# Patient Record
Sex: FEMALE | URBAN - NONMETROPOLITAN AREA
[De-identification: names, ages, dates, MRNs, and addresses within clinical notes are randomized per-mention and may not be internally consistent; named-entity substitution may affect disease eponyms.]

---

## 2017-08-17 ENCOUNTER — APPOINTMENT (RX ONLY)
Dept: URBAN - NONMETROPOLITAN AREA CLINIC 1 | Facility: CLINIC | Age: 72
Setting detail: DERMATOLOGY
End: 2017-08-17

## 2017-08-17 DIAGNOSIS — L57.0 ACTINIC KERATOSIS: ICD-10-CM

## 2017-08-17 DIAGNOSIS — L82.1 OTHER SEBORRHEIC KERATOSIS: ICD-10-CM

## 2017-08-17 DIAGNOSIS — L81.4 OTHER MELANIN HYPERPIGMENTATION: ICD-10-CM

## 2017-08-17 PROCEDURE — 99213 OFFICE O/P EST LOW 20 MIN: CPT | Mod: 25

## 2017-08-17 PROCEDURE — ? LIQUID NITROGEN

## 2017-08-17 PROCEDURE — ? COUNSELING

## 2017-08-17 PROCEDURE — ? OBSERVATION

## 2017-08-17 PROCEDURE — 17003 DESTRUCT PREMALG LES 2-14: CPT

## 2017-08-17 PROCEDURE — 17000 DESTRUCT PREMALG LESION: CPT

## 2017-08-17 ASSESSMENT — LOCATION DETAILED DESCRIPTION DERM
LOCATION DETAILED: LEFT ANTERIOR PROXIMAL UPPER ARM
LOCATION DETAILED: LEFT MID-UPPER BACK
LOCATION DETAILED: LEFT PROXIMAL PRETIBIAL REGION
LOCATION DETAILED: LEFT MEDIAL BREAST 9-10:00 REGION
LOCATION DETAILED: LEFT VENTRAL PROXIMAL FOREARM

## 2017-08-17 ASSESSMENT — LOCATION SIMPLE DESCRIPTION DERM
LOCATION SIMPLE: LEFT PRETIBIAL REGION
LOCATION SIMPLE: LEFT BREAST
LOCATION SIMPLE: LEFT UPPER ARM
LOCATION SIMPLE: LEFT FOREARM
LOCATION SIMPLE: LEFT UPPER BACK

## 2017-08-17 ASSESSMENT — LOCATION ZONE DERM
LOCATION ZONE: LEG
LOCATION ZONE: TRUNK
LOCATION ZONE: ARM

## 2017-08-17 NOTE — PROCEDURE: OBSERVATION
Body Location Override (Optional - Billing Will Still Be Based On Selected Body Map Location If Applicable): L forearm
X Size Of Lesion In Cm (Optional): 1
Detail Level: Detailed
Morphology Per Location (Optional): Dark spot at 10 o'clock

## 2018-03-07 ENCOUNTER — APPOINTMENT (RX ONLY)
Dept: URBAN - NONMETROPOLITAN AREA CLINIC 1 | Facility: CLINIC | Age: 73
Setting detail: DERMATOLOGY
End: 2018-03-07

## 2018-03-07 DIAGNOSIS — L57.0 ACTINIC KERATOSIS: ICD-10-CM

## 2018-03-07 DIAGNOSIS — L82.1 OTHER SEBORRHEIC KERATOSIS: ICD-10-CM

## 2018-03-07 PROCEDURE — ? COUNSELING

## 2018-03-07 PROCEDURE — ? LIQUID NITROGEN

## 2018-03-07 PROCEDURE — 17000 DESTRUCT PREMALG LESION: CPT

## 2018-03-07 PROCEDURE — 17003 DESTRUCT PREMALG LES 2-14: CPT

## 2018-03-07 PROCEDURE — 99214 OFFICE O/P EST MOD 30 MIN: CPT | Mod: 25

## 2018-03-07 ASSESSMENT — LOCATION DETAILED DESCRIPTION DERM
LOCATION DETAILED: LEFT CENTRAL MALAR CHEEK
LOCATION DETAILED: RIGHT UPPER CUTANEOUS LIP
LOCATION DETAILED: RIGHT INFERIOR MEDIAL MALAR CHEEK
LOCATION DETAILED: MIDDLE STERNUM

## 2018-03-07 ASSESSMENT — LOCATION ZONE DERM
LOCATION ZONE: LIP
LOCATION ZONE: FACE
LOCATION ZONE: TRUNK

## 2018-03-07 ASSESSMENT — LOCATION SIMPLE DESCRIPTION DERM
LOCATION SIMPLE: RIGHT LIP
LOCATION SIMPLE: LEFT CHEEK
LOCATION SIMPLE: RIGHT CHEEK
LOCATION SIMPLE: CHEST

## 2018-09-06 ENCOUNTER — APPOINTMENT (RX ONLY)
Dept: URBAN - NONMETROPOLITAN AREA CLINIC 1 | Facility: CLINIC | Age: 73
Setting detail: DERMATOLOGY
End: 2018-09-06

## 2018-09-06 DIAGNOSIS — L57.8 OTHER SKIN CHANGES DUE TO CHRONIC EXPOSURE TO NONIONIZING RADIATION: ICD-10-CM

## 2018-09-06 DIAGNOSIS — L82.0 INFLAMED SEBORRHEIC KERATOSIS: ICD-10-CM

## 2018-09-06 PROCEDURE — 17110 DESTRUCTION B9 LES UP TO 14: CPT

## 2018-09-06 PROCEDURE — 99213 OFFICE O/P EST LOW 20 MIN: CPT | Mod: 25

## 2018-09-06 PROCEDURE — ? COUNSELING

## 2018-09-06 PROCEDURE — ? LIQUID NITROGEN

## 2018-09-06 ASSESSMENT — LOCATION DETAILED DESCRIPTION DERM
LOCATION DETAILED: RIGHT INFERIOR UPPER BACK
LOCATION DETAILED: RIGHT FOREHEAD
LOCATION DETAILED: LEFT CLAVICULAR SKIN
LOCATION DETAILED: LEFT MEDIAL UPPER BACK
LOCATION DETAILED: LEFT INFERIOR UPPER BACK
LOCATION DETAILED: RIGHT MID-UPPER BACK
LOCATION DETAILED: LEFT POSTERIOR SHOULDER
LOCATION DETAILED: LEFT MID-UPPER BACK
LOCATION DETAILED: STERNAL NOTCH
LOCATION DETAILED: SUBXIPHOID

## 2018-09-06 ASSESSMENT — LOCATION SIMPLE DESCRIPTION DERM
LOCATION SIMPLE: CHEST
LOCATION SIMPLE: LEFT CLAVICULAR SKIN
LOCATION SIMPLE: RIGHT FOREHEAD
LOCATION SIMPLE: RIGHT UPPER BACK
LOCATION SIMPLE: LEFT SHOULDER
LOCATION SIMPLE: LEFT UPPER BACK
LOCATION SIMPLE: ABDOMEN

## 2018-09-06 ASSESSMENT — LOCATION ZONE DERM
LOCATION ZONE: FACE
LOCATION ZONE: TRUNK
LOCATION ZONE: ARM

## 2018-09-06 NOTE — PROCEDURE: LIQUID NITROGEN
Add 52 Modifier (Optional): no
Include Z78.9 (Other Specified Conditions Influencing Health Status) As An Associated Diagnosis?: Yes
Medical Necessity Clause: This procedure was medically necessary because the lesions that were treated were:
Consent: The patient's consent was obtained including but not limited to risks of crusting, scabbing, blistering, scarring, darker or lighter pigmentary change, recurrence, incomplete removal and infection.
Detail Level: Simple
Number Of Freeze-Thaw Cycles: 2 freeze-thaw cycles
Medical Necessity Information: It is in your best interest to select a reason for this procedure from the list below. All of these items fulfill various CMS LCD requirements except the new and changing color options.
Post-Care Instructions: I reviewed with the patient in detail post-care instructions. Patient is to wear sunprotection, and avoid picking at any of the treated lesions. Pt may apply Vaseline to crusted or scabbing areas.

## 2018-12-11 ENCOUNTER — HOSPITAL ENCOUNTER (INPATIENT)
Facility: MEDICAL CENTER | Age: 73
LOS: 2 days | DRG: 446 | End: 2018-12-14
Attending: HOSPITALIST | Admitting: HOSPITALIST
Payer: MEDICARE

## 2018-12-11 ENCOUNTER — HOSPITAL ENCOUNTER (OUTPATIENT)
Facility: MEDICAL CENTER | Age: 73
End: 2018-12-11
Admitting: HOSPITALIST
Payer: MEDICARE

## 2018-12-11 PROBLEM — E03.9 HYPOTHYROID: Status: ACTIVE | Noted: 2018-12-11

## 2018-12-11 PROBLEM — K83.1 OBSTRUCTIVE JAUNDICE: Status: ACTIVE | Noted: 2018-12-11

## 2018-12-11 PROBLEM — I10 HYPERTENSION: Status: ACTIVE | Noted: 2018-12-11

## 2018-12-11 PROCEDURE — G0378 HOSPITAL OBSERVATION PER HR: HCPCS

## 2018-12-11 PROCEDURE — 99218 PR INITIAL OBSERVATION CARE,LEVL I: CPT | Performed by: HOSPITALIST

## 2018-12-11 PROCEDURE — 700105 HCHG RX REV CODE 258: Performed by: HOSPITALIST

## 2018-12-11 RX ORDER — LANOLIN ALCOHOL/MO/W.PET/CERES
2000 CREAM (GRAM) TOPICAL DAILY
COMMUNITY

## 2018-12-11 RX ORDER — AMOXICILLIN 250 MG
2 CAPSULE ORAL 2 TIMES DAILY
Status: DISCONTINUED | OUTPATIENT
Start: 2018-12-11 | End: 2018-12-14 | Stop reason: HOSPADM

## 2018-12-11 RX ORDER — SODIUM CHLORIDE 9 MG/ML
INJECTION, SOLUTION INTRAVENOUS CONTINUOUS
Status: DISCONTINUED | OUTPATIENT
Start: 2018-12-11 | End: 2018-12-13

## 2018-12-11 RX ORDER — ACETAMINOPHEN 160 MG
2000 TABLET,DISINTEGRATING ORAL DAILY
COMMUNITY

## 2018-12-11 RX ORDER — ACETAMINOPHEN 325 MG/1
650 TABLET ORAL EVERY 6 HOURS PRN
Status: DISCONTINUED | OUTPATIENT
Start: 2018-12-11 | End: 2018-12-14 | Stop reason: HOSPADM

## 2018-12-11 RX ORDER — OXYCODONE HYDROCHLORIDE 5 MG/1
2.5 TABLET ORAL
Status: DISCONTINUED | OUTPATIENT
Start: 2018-12-11 | End: 2018-12-14 | Stop reason: HOSPADM

## 2018-12-11 RX ORDER — OXYCODONE HYDROCHLORIDE 5 MG/1
5 TABLET ORAL
Status: DISCONTINUED | OUTPATIENT
Start: 2018-12-11 | End: 2018-12-14 | Stop reason: HOSPADM

## 2018-12-11 RX ORDER — LEVOTHYROXINE SODIUM 0.1 MG/1
100 TABLET ORAL DAILY
Status: DISCONTINUED | OUTPATIENT
Start: 2018-12-12 | End: 2018-12-14 | Stop reason: HOSPADM

## 2018-12-11 RX ORDER — ATORVASTATIN CALCIUM 40 MG/1
40 TABLET, FILM COATED ORAL EVERY EVENING
COMMUNITY
Start: 2018-10-23 | End: 2019-06-04

## 2018-12-11 RX ORDER — LISINOPRIL 5 MG/1
5 TABLET ORAL DAILY
Status: ON HOLD | COMMUNITY
Start: 2018-10-23 | End: 2019-02-09

## 2018-12-11 RX ORDER — POLYETHYLENE GLYCOL 3350 17 G/17G
1 POWDER, FOR SOLUTION ORAL
Status: DISCONTINUED | OUTPATIENT
Start: 2018-12-11 | End: 2018-12-14 | Stop reason: HOSPADM

## 2018-12-11 RX ORDER — LEVOTHYROXINE SODIUM 0.1 MG/1
100 TABLET ORAL DAILY
COMMUNITY
Start: 2018-10-23

## 2018-12-11 RX ORDER — ENALAPRILAT 1.25 MG/ML
1.25 INJECTION INTRAVENOUS EVERY 6 HOURS PRN
Status: DISCONTINUED | OUTPATIENT
Start: 2018-12-11 | End: 2018-12-14 | Stop reason: HOSPADM

## 2018-12-11 RX ORDER — BISACODYL 10 MG
10 SUPPOSITORY, RECTAL RECTAL
Status: DISCONTINUED | OUTPATIENT
Start: 2018-12-11 | End: 2018-12-14 | Stop reason: HOSPADM

## 2018-12-11 RX ORDER — LISINOPRIL 5 MG/1
5 TABLET ORAL DAILY
Status: DISCONTINUED | OUTPATIENT
Start: 2018-12-12 | End: 2018-12-14 | Stop reason: HOSPADM

## 2018-12-11 RX ORDER — MORPHINE SULFATE 4 MG/ML
2 INJECTION, SOLUTION INTRAMUSCULAR; INTRAVENOUS
Status: DISCONTINUED | OUTPATIENT
Start: 2018-12-11 | End: 2018-12-14 | Stop reason: HOSPADM

## 2018-12-11 RX ORDER — ATORVASTATIN CALCIUM 40 MG/1
40 TABLET, FILM COATED ORAL EVERY EVENING
Status: DISCONTINUED | OUTPATIENT
Start: 2018-12-11 | End: 2018-12-14 | Stop reason: HOSPADM

## 2018-12-11 RX ADMIN — SODIUM CHLORIDE: 9 INJECTION, SOLUTION INTRAVENOUS at 23:53

## 2018-12-11 ASSESSMENT — COGNITIVE AND FUNCTIONAL STATUS - GENERAL
DAILY ACTIVITIY SCORE: 24
SUGGESTED CMS G CODE MODIFIER MOBILITY: CH
SUGGESTED CMS G CODE MODIFIER DAILY ACTIVITY: CH
MOBILITY SCORE: 24

## 2018-12-11 ASSESSMENT — ENCOUNTER SYMPTOMS
COUGH: 0
DEPRESSION: 0
NAUSEA: 1
SORE THROAT: 0
BLURRED VISION: 0
SHORTNESS OF BREATH: 0
TINGLING: 0
INSOMNIA: 0
HEADACHES: 0
CHILLS: 0
PALPITATIONS: 0
FEVER: 0
DIZZINESS: 0
VOMITING: 0
ABDOMINAL PAIN: 1
NECK PAIN: 0
EYE PAIN: 0
BACK PAIN: 0

## 2018-12-11 ASSESSMENT — PATIENT HEALTH QUESTIONNAIRE - PHQ9
2. FEELING DOWN, DEPRESSED, IRRITABLE, OR HOPELESS: NOT AT ALL
1. LITTLE INTEREST OR PLEASURE IN DOING THINGS: NOT AT ALL
SUM OF ALL RESPONSES TO PHQ9 QUESTIONS 1 AND 2: 0

## 2018-12-11 ASSESSMENT — LIFESTYLE VARIABLES
ALCOHOL_USE: YES
AVERAGE NUMBER OF DAYS PER WEEK YOU HAVE A DRINK CONTAINING ALCOHOL: 6
TOTAL SCORE: 0
TOTAL SCORE: 0
ON A TYPICAL DAY WHEN YOU DRINK ALCOHOL HOW MANY DRINKS DO YOU HAVE: 1
EVER HAD A DRINK FIRST THING IN THE MORNING TO STEADY YOUR NERVES TO GET RID OF A HANGOVER: NO
HAVE PEOPLE ANNOYED YOU BY CRITICIZING YOUR DRINKING: NO
CONSUMPTION TOTAL: NEGATIVE
TOTAL SCORE: 0
EVER FELT BAD OR GUILTY ABOUT YOUR DRINKING: NO
HOW MANY TIMES IN THE PAST YEAR HAVE YOU HAD 5 OR MORE DRINKS IN A DAY: 0
HAVE YOU EVER FELT YOU SHOULD CUT DOWN ON YOUR DRINKING: NO

## 2018-12-11 ASSESSMENT — PAIN SCALES - GENERAL
PAINLEVEL_OUTOF10: 0
PAINLEVEL_OUTOF10: 0

## 2018-12-11 NOTE — PROGRESS NOTES
Patient is a direct admit from [facility], patient of Dr. Barron's for common bile duct obstruction.  Dr. Dagoberto Zhu is accepting the patient.  Telephone ADT order received and entered into epic on 12/11.  Held orders to be released upon patients arrival to unit.

## 2018-12-12 LAB
ALBUMIN SERPL BCP-MCNC: 3.5 G/DL (ref 3.2–4.9)
ALBUMIN/GLOB SERPL: 1.4 G/DL
ALP SERPL-CCNC: 260 U/L (ref 30–99)
ALT SERPL-CCNC: 585 U/L (ref 2–50)
ANION GAP SERPL CALC-SCNC: 5 MMOL/L (ref 0–11.9)
AST SERPL-CCNC: 355 U/L (ref 12–45)
BASOPHILS # BLD AUTO: 0.7 % (ref 0–1.8)
BASOPHILS # BLD: 0.03 K/UL (ref 0–0.12)
BILIRUB SERPL-MCNC: 2.1 MG/DL (ref 0.1–1.5)
BUN SERPL-MCNC: 5 MG/DL (ref 8–22)
CALCIUM SERPL-MCNC: 8.6 MG/DL (ref 8.4–10.2)
CHLORIDE SERPL-SCNC: 108 MMOL/L (ref 96–112)
CO2 SERPL-SCNC: 24 MMOL/L (ref 20–33)
CREAT SERPL-MCNC: 0.79 MG/DL (ref 0.5–1.4)
EOSINOPHIL # BLD AUTO: 0.08 K/UL (ref 0–0.51)
EOSINOPHIL NFR BLD: 1.9 % (ref 0–6.9)
ERYTHROCYTE [DISTWIDTH] IN BLOOD BY AUTOMATED COUNT: 42.7 FL (ref 35.9–50)
GLOBULIN SER CALC-MCNC: 2.5 G/DL (ref 1.9–3.5)
GLUCOSE SERPL-MCNC: 94 MG/DL (ref 65–99)
HCT VFR BLD AUTO: 36.6 % (ref 37–47)
HGB BLD-MCNC: 12.4 G/DL (ref 12–16)
IMM GRANULOCYTES # BLD AUTO: 0.01 K/UL (ref 0–0.11)
IMM GRANULOCYTES NFR BLD AUTO: 0.2 % (ref 0–0.9)
LYMPHOCYTES # BLD AUTO: 0.64 K/UL (ref 1–4.8)
LYMPHOCYTES NFR BLD: 15.2 % (ref 22–41)
MAGNESIUM SERPL-MCNC: 1.9 MG/DL (ref 1.5–2.5)
MCH RBC QN AUTO: 31.5 PG (ref 27–33)
MCHC RBC AUTO-ENTMCNC: 33.9 G/DL (ref 33.6–35)
MCV RBC AUTO: 92.9 FL (ref 81.4–97.8)
MONOCYTES # BLD AUTO: 0.33 K/UL (ref 0–0.85)
MONOCYTES NFR BLD AUTO: 7.8 % (ref 0–13.4)
NEUTROPHILS # BLD AUTO: 3.12 K/UL (ref 2–7.15)
NEUTROPHILS NFR BLD: 74.2 % (ref 44–72)
NRBC # BLD AUTO: 0 K/UL
NRBC BLD-RTO: 0 /100 WBC
PLATELET # BLD AUTO: 194 K/UL (ref 164–446)
PMV BLD AUTO: 10.1 FL (ref 9–12.9)
POTASSIUM SERPL-SCNC: 3.8 MMOL/L (ref 3.6–5.5)
PROT SERPL-MCNC: 6 G/DL (ref 6–8.2)
RBC # BLD AUTO: 3.94 M/UL (ref 4.2–5.4)
SODIUM SERPL-SCNC: 137 MMOL/L (ref 135–145)
WBC # BLD AUTO: 4.2 K/UL (ref 4.8–10.8)

## 2018-12-12 PROCEDURE — 700102 HCHG RX REV CODE 250 W/ 637 OVERRIDE(OP): Performed by: HOSPITALIST

## 2018-12-12 PROCEDURE — 36415 COLL VENOUS BLD VENIPUNCTURE: CPT

## 2018-12-12 PROCEDURE — 700105 HCHG RX REV CODE 258: Performed by: HOSPITALIST

## 2018-12-12 PROCEDURE — 80053 COMPREHEN METABOLIC PANEL: CPT

## 2018-12-12 PROCEDURE — 770006 HCHG ROOM/CARE - MED/SURG/GYN SEMI*

## 2018-12-12 PROCEDURE — 83735 ASSAY OF MAGNESIUM: CPT

## 2018-12-12 PROCEDURE — A9270 NON-COVERED ITEM OR SERVICE: HCPCS | Performed by: HOSPITALIST

## 2018-12-12 PROCEDURE — 99232 SBSQ HOSP IP/OBS MODERATE 35: CPT | Performed by: HOSPITALIST

## 2018-12-12 PROCEDURE — 85025 COMPLETE CBC W/AUTO DIFF WBC: CPT

## 2018-12-12 RX ADMIN — POLYETHYLENE GLYCOL 3350 1 PACKET: 17 POWDER, FOR SOLUTION ORAL at 17:20

## 2018-12-12 RX ADMIN — LEVOTHYROXINE SODIUM 100 MCG: 100 TABLET ORAL at 05:14

## 2018-12-12 RX ADMIN — DOCUSATE SODIUM AND SENNOSIDES 2 TABLET: 8.6; 5 TABLET, FILM COATED ORAL at 17:17

## 2018-12-12 RX ADMIN — ATORVASTATIN CALCIUM 40 MG: 40 TABLET, FILM COATED ORAL at 17:17

## 2018-12-12 RX ADMIN — SODIUM CHLORIDE: 9 INJECTION, SOLUTION INTRAVENOUS at 20:59

## 2018-12-12 RX ADMIN — LISINOPRIL 5 MG: 5 TABLET ORAL at 05:14

## 2018-12-12 RX ADMIN — ASPIRIN 81 MG: 81 TABLET, COATED ORAL at 05:12

## 2018-12-12 ASSESSMENT — ENCOUNTER SYMPTOMS
NAUSEA: 0
MUSCULOSKELETAL NEGATIVE: 1
BACK PAIN: 0
NEUROLOGICAL NEGATIVE: 1
FLANK PAIN: 0
VOMITING: 0
CHILLS: 0
PSYCHIATRIC NEGATIVE: 1
GASTROINTESTINAL NEGATIVE: 1
SHORTNESS OF BREATH: 0
DEPRESSION: 0
BRUISES/BLEEDS EASILY: 0
WEAKNESS: 0
WEIGHT LOSS: 0
LOSS OF CONSCIOUSNESS: 0
FEVER: 0
NERVOUS/ANXIOUS: 0
RESPIRATORY NEGATIVE: 1
COUGH: 0
ABDOMINAL PAIN: 0
MYALGIAS: 0
CARDIOVASCULAR NEGATIVE: 1
DIZZINESS: 0
CONSTITUTIONAL NEGATIVE: 1

## 2018-12-12 ASSESSMENT — PAIN SCALES - GENERAL
PAINLEVEL_OUTOF10: 0

## 2018-12-12 NOTE — ASSESSMENT & PLAN NOTE
GI has consulted, discussed with Dr. Schaffer, he is working on getting ERCP scheduled hopefully today.  Pain management IV morphine as needed.  CMP and lipase in the morning.  .

## 2018-12-12 NOTE — H&P
Hospital Medicine History & Physical Note    Date of Service  12/11/2018    Primary Care Physician  Damien Phillips M.D.    Consultants  GI- unclear name- Dr Barron from [facility] discussed with them and they plan on seeing her tomorrow.     Code Status  full    Chief Complaint  Abdominal pain    History of Presenting Illness  73 y.o. female who presented 12/11/2018 with abdominal pain 2 days ago to a hospital in [place]. She was found to have evidence of obstructive jaundice. Her pain subsided but her labs worsened. An MRCP was done and shows a common bile duct dilation. Ultrasound and with remainder of her workup were not consistent with cholecystitis. She has a remote history of a bile duct obstruction at age 8 that required surgery to repair. She has been transferred here for ercp and possible dilation of a suspected stricture.     Review of Systems  Review of Systems   Constitutional: Negative for chills and fever.   HENT: Negative for sore throat.    Eyes: Negative for blurred vision and pain.   Respiratory: Negative for cough and shortness of breath.    Cardiovascular: Negative for chest pain and palpitations.   Gastrointestinal: Positive for abdominal pain and nausea. Negative for vomiting.   Genitourinary: Negative for dysuria and urgency.   Musculoskeletal: Negative for back pain and neck pain.   Skin: Negative for itching and rash.   Neurological: Negative for dizziness, tingling and headaches.   Psychiatric/Behavioral: Negative for depression. The patient does not have insomnia.    All other systems reviewed and are negative.      Past Medical History   has a past medical history of Hypertension and Hypothyroid.    Surgical History   has no past surgical history on file.     Family History  family history is not on file.     Social History   reports that she has quit smoking. She has never used smokeless tobacco. She reports that she drinks alcohol. She reports that she does not use  drugs.    Allergies  No Known Allergies    Medications  Prior to Admission Medications   Prescriptions Last Dose Informant Patient Reported? Taking?   Cholecalciferol (VITAMIN D3) 2000 UNIT Cap   Yes No   Sig: Take 2,000 Units by mouth every day.   Cyanocobalamin (VITAMIN B-12) 1000 MCG Tab   Yes No   Sig: Take 2,000 mcg by mouth every day.   aspirin EC (ECOTRIN) 81 MG Tablet Delayed Response   Yes No   Sig: Take 81 mg by mouth every day.   atorvastatin (LIPITOR) 40 MG Tab   Yes Yes   Sig: Take 40 mg by mouth every evening.   levothyroxine (SYNTHROID) 100 MCG Tab   Yes Yes   Sig: Take 100 mcg by mouth every day.   lisinopril (PRINIVIL) 5 MG Tab   Yes Yes   Sig: Take 5 mg by mouth every day.      Facility-Administered Medications: None       Physical Exam  Temp:  [36.9 °C (98.4 °F)-37.1 °C (98.8 °F)] 37.1 °C (98.8 °F)  Pulse:  [56-61] 56  Resp:  [18] 18  BP: (130-155)/(56-59) 130/56    Physical Exam   Constitutional: She is oriented to person, place, and time. She appears well-developed and well-nourished. No distress.   Patient seen and examined  Discussed plan with RN   ELISABETHT:   Right Ear: External ear normal.   Left Ear: External ear normal.   Nose: Nose normal.   Eyes: Conjunctivae are normal. Right eye exhibits no discharge. Left eye exhibits no discharge. Scleral icterus is present.   Neck: No JVD present.   Cardiovascular: Regular rhythm and normal heart sounds.    No murmur heard.  Cap refill 2sec  Pulses 2+ throughout     Pulmonary/Chest: Effort normal and breath sounds normal. No stridor. No respiratory distress. She has no wheezes. She has no rales.   Abdominal: Soft. Bowel sounds are normal. She exhibits no distension. There is no tenderness.   Musculoskeletal: She exhibits no edema or tenderness.   Neurological: She is alert and oriented to person, place, and time.   Skin: Skin is warm and dry. She is not diaphoretic. No erythema.   Normal skin  Color.    Psychiatric: She has a normal mood and affect. Her  behavior is normal.   Nursing note and vitals reviewed.      Laboratory:          No results for input(s): ALTSGPT, ASTSGOT, ALKPHOSPHAT, TBILIRUBIN, DBILIRUBIN, GAMMAGT, AMYLASE, LIPASE, ALB, PREALBUMIN, GLUCOSE in the last 72 hours.              No results for input(s): TROPONINI in the last 72 hours.    Urinalysis:    No results found     Imaging:  No orders to display         Assessment/Plan:  I anticipate this patient is appropriate    Obstructive jaundice   Assessment & Plan    Trend labs  GI consulted for ercp- gi consultants were contacted tonight and made aware that patient is here.   Pain control  hopefully this is just a stricture given her surgical history in  The last. She has no symptoms of cholecystitis. Other common diagnoses may be a passed stone but not seen on mrcp and labs are worsening.      Hypothyroid   Assessment & Plan    synthroid     Hypertension   Assessment & Plan    lisinopril         VTE prophylaxis: lovenox

## 2018-12-12 NOTE — CARE PLAN
Problem: Safety  Goal: Will remain free from injury  Outcome: MET Date Met: 12/12/18      Problem: Knowledge Deficit  Goal: Knowledge of disease process/condition, treatment plan, diagnostic tests, and medications will improve  Outcome: PROGRESSING AS EXPECTED

## 2018-12-12 NOTE — PROGRESS NOTES
Assumed care of patient from night shift RN  73 year old female  Full code  NKA  Admitted 12/11 s/p abd pain, jaundice  A&O x 4  RA  Cardiac: WNL  LBM: PTA  NPO  Voiding appropriately  Skin: intact  Up self  PIV: 20 g L AC with NS at 100  No fall risk per breanna elena  Plan: ERCP today  All questions answered and all needs met at this time. Bed in low, locked position. Call light within reach and patient verbalized understanding of proper use. RN will implement hourly rounding and answer call lights appropriately.

## 2018-12-12 NOTE — CONSULTS
"Gastroenterology Consult Note:    Christopher Schaffer  Date & Time note created:    12/12/2018   9:05 AM     Referring MD:  Dr. Dagoberto Zhu    Patient ID:   Name:             Clementine Hopper     YOB: 1945  Age:                 73 y.o.  female   MRN:               [id number]                                                             Reason for Consult:      Jaundice and dilated bile duct on MRCP    History of Present Illness:    Clementine is a pleasant 73 year old woman from [place], [place] who was transferred here from [facility] to evaluate jaundice and dilated bile duct on MRCP.    She had a history of bile duct obstruction as a child that was causing \"hepatitis\" and eventually was correctly diagnosed by exploratory laparotomy at age 8.  The \"repaired\" the duct and she had a T-tube in place for 6 months before it was removed.  She never had any subsequent liver issues.    On Monday (2 days ago) she developed acute onset of extreme epigastric pain which radiated around to her back.  After 45 minutes of unrelenting pain, she called 911.  The EMTs noted she was in distress with high blood pressure and had her go to the ER at [facility].  US and subsequent MRCP were abnormal (see below).  Her bilirubin jumped from normal on admission to 2.0.  Her liver enzymes were elevated. Reviewing the Epic chart, she had a CT scan here in 2016 which revealed normal liver and biliary tree.     At this time her pain has resolved.    Review of Systems:      Constitutional: Denies fevers, chills, and sweats, Denies weight changes.  Cardiovascular: Denies chest pain or palpitations.  Respiratory: Denies shortness of breath, cough, and wheezing.  Gastrointestinal/Hepatic: As above, Denies nausea, vomiting, diarrhea, constipation or GI bleeding   Skin: Denies rash or lesions.  Endocrine: Has hypothyroidism on replacement therapy.  All other systems were reviewed and are negative (AMA/CMS criteria)       "          Past Medical History:   Past Medical History:   Diagnosis Date   • Hypertension    • Hypothyroid          Past Surgical History:  History reviewed. No pertinent surgical history.    Hospital Medications:    Current Facility-Administered Medications:   •  senna-docusate (PERICOLACE or SENOKOT S) 8.6-50 MG per tablet 2 Tab, 2 Tab, Oral, BID **AND** polyethylene glycol/lytes (MIRALAX) PACKET 1 Packet, 1 Packet, Oral, QDAY PRN **AND** magnesium hydroxide (MILK OF MAGNESIA) suspension 30 mL, 30 mL, Oral, QDAY PRN **AND** bisacodyl (DULCOLAX) suppository 10 mg, 10 mg, Rectal, QDAY PRN, Dagoberto Zhu M.D.  •  NS infusion, , Intravenous, Continuous, Dagoberto Zhu M.D., Last Rate: 100 mL/hr at 12/11/18 2353  •  enoxaparin (LOVENOX) inj 40 mg, 40 mg, Subcutaneous, DAILY, Dagoberto Zhu M.D., Stopped at 12/12/18 0600  •  enalaprilat (VASOTEC) injection 1.25 mg, 1.25 mg, Intravenous, Q6HRS PRN, Dagoberto Zhu M.D.  •  acetaminophen (TYLENOL) tablet 650 mg, 650 mg, Oral, Q6HRS PRN, Dagoberto Zhu M.D.  •  Notify provider if pain remains uncontrolled, , , CONTINUOUS **AND** Use the numeric rating scale (NRS-11) on regular floors and Critical-Care Pain Observation Tool (CPOT) on ICUs/Trauma to assess pain, , , CONTINUOUS **AND** Pulse Ox (Oximetry), , , CONTINUOUS **AND** Pharmacy Consult Request ...Pain Management Review, , Other, PRN **AND** If patient difficult to arouse and/or has respiratory depression, stop any opiates that are currently infusing and call a Rapid Response., , , CONTINUOUS **AND** oxyCODONE immediate-release (ROXICODONE) tablet 2.5 mg, 2.5 mg, Oral, Q3HRS PRN **AND** oxyCODONE immediate-release (ROXICODONE) tablet 5 mg, 5 mg, Oral, Q3HRS PRN **AND** morphine (pf) 4 mg/ml injection 2 mg, 2 mg, Intravenous, Q3HRS PRN, Dagoberto Zhu M.D.  •  aspirin EC (ECOTRIN) tablet 81 mg, 81 mg, Oral, DAILY, Dagoberto Zhu M.D., 81 mg at 12/12/18 0512  •  atorvastatin (LIPITOR) tablet 40 mg, 40 mg,  Oral, Q EVENING, Dagoberto Zhu M.D.  •  levothyroxine (SYNTHROID) tablet 100 mcg, 100 mcg, Oral, DAILY, Dagoberto Zhu M.D., 100 mcg at 12/12/18 0514  •  lisinopril (PRINIVIL) tablet 5 mg, 5 mg, Oral, DAILY, Dagoberto Zhu M.D., 5 mg at 12/12/18 0514    Current Outpatient Medications:  Current Facility-Administered Medications   Medication Dose Route Frequency Provider Last Rate Last Dose   • senna-docusate (PERICOLACE or SENOKOT S) 8.6-50 MG per tablet 2 Tab  2 Tab Oral BID Dagoberto Zhu M.D.        And   • polyethylene glycol/lytes (MIRALAX) PACKET 1 Packet  1 Packet Oral QDAY PRN Dagoberto Zhu M.D.        And   • magnesium hydroxide (MILK OF MAGNESIA) suspension 30 mL  30 mL Oral QDAY PRN Dagoberto Zhu M.D.        And   • bisacodyl (DULCOLAX) suppository 10 mg  10 mg Rectal QDAY PRN Dagoberto Zhu M.D.       • NS infusion   Intravenous Continuous Dagoberto Zhu M.D. 100 mL/hr at 12/11/18 2353     • enoxaparin (LOVENOX) inj 40 mg  40 mg Subcutaneous DAILY Dagoberto Zhu M.D.   Stopped at 12/12/18 0600   • enalaprilat (VASOTEC) injection 1.25 mg  1.25 mg Intravenous Q6HRS PRN Dagoberto Zhu M.D.       • acetaminophen (TYLENOL) tablet 650 mg  650 mg Oral Q6HRS PRN Dagoberto Zhu M.D.       • Pharmacy Consult Request ...Pain Management Review   Other PRN Dagoberto Zhu M.D.        And   • oxyCODONE immediate-release (ROXICODONE) tablet 2.5 mg  2.5 mg Oral Q3HRS PRN Dagoberto Zhu M.D.        And   • oxyCODONE immediate-release (ROXICODONE) tablet 5 mg  5 mg Oral Q3HRS PRN Dagoberto Zhu M.D.        And   • morphine (pf) 4 mg/ml injection 2 mg  2 mg Intravenous Q3HRS PRN Dagoberto Zhu M.D.       • aspirin EC (ECOTRIN) tablet 81 mg  81 mg Oral DAILY Dagoberto Zhu M.D.   81 mg at 12/12/18 0512   • atorvastatin (LIPITOR) tablet 40 mg  40 mg Oral Q EVENING Dagoberto Zhu M.D.       • levothyroxine (SYNTHROID) tablet 100 mcg  100 mcg Oral DAILY Dagoberto Zhu M.D.   100 mcg at  "12/12/18 0514   • lisinopril (PRINIVIL) tablet 5 mg  5 mg Oral DAILY Dagoberto Zhu M.D.   5 mg at 12/12/18 0514       Medication Allergy:  No Known Allergies    Family History:  Family History   Problem Relation Age of Onset   • Cancer Mother         Head and neck cancer       Social History:  Social History     Social History   • Marital status: Single     Spouse name: N/A   • Number of children: N/A   • Years of education: N/A     Occupational History   • Not on file.     Social History Main Topics   • Smoking status: Former Smoker   • Smokeless tobacco: Never Used   • Alcohol use Yes      Comment: 1-2 glasses of wine a night   • Drug use: No   • Sexual activity: Not on file     Other Topics Concern   • Not on file     Social History Narrative   • No narrative on file         Physical Exam:  Vitals/ General Appearance:   Weight/BMI: Body mass index is 22.64 kg/m².  Blood pressure 117/47, pulse 62, temperature 36.6 °C (97.9 °F), temperature source Oral, resp. rate 18, height 1.613 m (5' 3.5\"), weight 58.9 kg (129 lb 13.6 oz), SpO2 96 %, not currently breastfeeding.  Vitals:    12/11/18 1826 12/11/18 2000 12/12/18 0200 12/12/18 0800   BP:  130/56 120/57 117/47   Pulse:  (!) 56 60 62   Resp:  18 18 18   Temp:  37.1 °C (98.8 °F) 37.3 °C (99.1 °F) 36.6 °C (97.9 °F)   TempSrc:  Oral Oral Oral   SpO2:  93% 96% 96%   Weight:       Height: 1.613 m (5' 3.5\")        Oxygen Therapy:  Pulse Oximetry: 96 %, O2 (LPM): 0, O2 Delivery: None (Room Air)    Constitutional:   Well developed, Well nourished, No acute distress  HENMT:  Sclera minimally icteric, Normocephalic, Atraumatic, Oropharynx moist mucous membranes, Mallampati score 2, No oral exudates, Nose normal.  No thyromegaly.  Eyes:  EOMI, Conjunctiva normal, No discharge.  Neck:  Normal range of motion, No cervical tenderness,  no JVD.  Cardiovascular:  Normal heart rate, Normal rhythm, No murmurs, No rubs, No gallops.     Lungs:  Normal breath sounds, breath sounds " clear to auscultation bilaterally,  no crackles, no wheezing.   Abdomen: RUQ Scar, Bowel sounds normal, Soft, No tenderness, No guarding, No rebound, No masses, No hepatosplenomegaly.  Skin: Warm, Dry, No erythema, No rash, no induration.  Neurologic: Alert & oriented x 3, No focal deficits noted, cranial nerves II through X are grossly intact.  Psychiatric: Affect normal, Judgment normal, Mood normal.      MDM (Data Review):     Records reviewed and summarized in current documentation    Lab Data Review:  10/11/2018: T bili 0.9, alk phos 239, , , lipase 24.  10/12/2018: T bili 2.0, alk phos 242, , .    Recent Results (from the past 24 hour(s))   CBC with Differential    Collection Time: 12/12/18  5:32 AM   Result Value Ref Range    WBC 4.2 (L) 4.8 - 10.8 K/uL    RBC 3.94 (L) 4.20 - 5.40 M/uL    Hemoglobin 12.4 12.0 - 16.0 g/dL    Hematocrit 36.6 (L) 37.0 - 47.0 %    MCV 92.9 81.4 - 97.8 fL    MCH 31.5 27.0 - 33.0 pg    MCHC 33.9 33.6 - 35.0 g/dL    RDW 42.7 35.9 - 50.0 fL    Platelet Count 194 164 - 446 K/uL    MPV 10.1 9.0 - 12.9 fL    Neutrophils-Polys 74.20 (H) 44.00 - 72.00 %    Lymphocytes 15.20 (L) 22.00 - 41.00 %    Monocytes 7.80 0.00 - 13.40 %    Eosinophils 1.90 0.00 - 6.90 %    Basophils 0.70 0.00 - 1.80 %    Immature Granulocytes 0.20 0.00 - 0.90 %    Nucleated RBC 0.00 /100 WBC    Neutrophils (Absolute) 3.12 2.00 - 7.15 K/uL    Lymphs (Absolute) 0.64 (L) 1.00 - 4.80 K/uL    Monos (Absolute) 0.33 0.00 - 0.85 K/uL    Eos (Absolute) 0.08 0.00 - 0.51 K/uL    Baso (Absolute) 0.03 0.00 - 0.12 K/uL    Immature Granulocytes (abs) 0.01 0.00 - 0.11 K/uL    NRBC (Absolute) 0.00 K/uL   Comp Metabolic Panel (CMP)    Collection Time: 12/12/18  5:32 AM   Result Value Ref Range    Sodium 137 135 - 145 mmol/L    Potassium 3.8 3.6 - 5.5 mmol/L    Chloride 108 96 - 112 mmol/L    Co2 24 20 - 33 mmol/L    Anion Gap 5.0 0.0 - 11.9    Glucose 94 65 - 99 mg/dL    Bun 5 (L) 8 - 22 mg/dL     Creatinine 0.79 0.50 - 1.40 mg/dL    Calcium 8.6 8.4 - 10.2 mg/dL    AST(SGOT) 355 (H) 12 - 45 U/L    ALT(SGPT) 585 (H) 2 - 50 U/L    Alkaline Phosphatase 260 (H) 30 - 99 U/L    Total Bilirubin 2.1 (H) 0.1 - 1.5 mg/dL    Albumin 3.5 3.2 - 4.9 g/dL    Total Protein 6.0 6.0 - 8.2 g/dL    Globulin 2.5 1.9 - 3.5 g/dL    A-G Ratio 1.4 g/dL   ESTIMATED GFR    Collection Time: 12/12/18  5:32 AM   Result Value Ref Range    GFR If African American >60 >60 mL/min/1.73 m 2    GFR If Non African American >60 >60 mL/min/1.73 m 2       Imaging/Procedures Review:    US: focal dilatation of the distal CBD c/w obstructing stone versus type I choledochoal cyst, no gallstones seen, slight liver heterogeneous echogenicity.  MRCP: distended GB, normal CBD size without stones, and cyclic/beaded extrahepatic ductal dilatation with focal intraluminal stricture at junction between extrahepatic ducts and proximal CBD.      MDM (Assessment and Plan):     Patient Active Problem List    Diagnosis Date Noted   • Hypertension 12/11/2018   • Hypothyroid 12/11/2018   • Obstructive jaundice 12/11/2018     IMPRESSION:  73 year old woman who presents with history of acute onset epigastric pain who has new elevation of liver tests and evidence of proximal CBD stricture.  Given history of bile duct repair as a child, she may have a post-operative stricture.  The fact that she also had pain suggests acute biliary obstruction, perhaps by a stone.  Cholangiocarcinoma is also on the differential, but would be less likely to present as acute onset pain.  ERCP is indicated for further evaluation and treatment.    #  Obstructive jaundice associated with spike of liver enzymes c/w acute biliary obstruction.  #  Epigastric pain, resolved.  #  Abnormal biliary imaging suggesting focal stricture at junction of proximal CBD with the extrahepatic ducts.  Possibilities: post-operative stricture, sclerosing cholangitis, or cholangiocarcinoma.  #  History of bile duct  stenosis requiring surgery at age 8.  #  Other problems: HTN, hypothyroidism.    RECOMMENDATION:  - ERCP will be arranged to diagnosis and treat.  She may require a sphincterotomy, balloon dilation, biopsies, and stent placement.  I have explained the risks, benefits and alternatives to the patient and she would like to proceed.      Thank your for the opportunity to assist in the care of your patient.  Please call for any questions or concerns.    Christopher Schaffer M.D.

## 2018-12-13 ENCOUNTER — APPOINTMENT (OUTPATIENT)
Dept: RADIOLOGY | Facility: MEDICAL CENTER | Age: 73
DRG: 446 | End: 2018-12-13
Attending: INTERNAL MEDICINE
Payer: MEDICARE

## 2018-12-13 LAB
ALBUMIN SERPL BCP-MCNC: 3.2 G/DL (ref 3.2–4.9)
ALBUMIN/GLOB SERPL: 1.4 G/DL
ALP SERPL-CCNC: 212 U/L (ref 30–99)
ALT SERPL-CCNC: 344 U/L (ref 2–50)
ANION GAP SERPL CALC-SCNC: 6 MMOL/L (ref 0–11.9)
AST SERPL-CCNC: 127 U/L (ref 12–45)
BASOPHILS # BLD AUTO: 0.4 % (ref 0–1.8)
BASOPHILS # BLD: 0.02 K/UL (ref 0–0.12)
BILIRUB SERPL-MCNC: 1.4 MG/DL (ref 0.1–1.5)
BUN SERPL-MCNC: 6 MG/DL (ref 8–22)
CALCIUM SERPL-MCNC: 8.2 MG/DL (ref 8.4–10.2)
CHLORIDE SERPL-SCNC: 109 MMOL/L (ref 96–112)
CO2 SERPL-SCNC: 22 MMOL/L (ref 20–33)
CREAT SERPL-MCNC: 0.71 MG/DL (ref 0.5–1.4)
EOSINOPHIL # BLD AUTO: 0.1 K/UL (ref 0–0.51)
EOSINOPHIL NFR BLD: 2.2 % (ref 0–6.9)
ERYTHROCYTE [DISTWIDTH] IN BLOOD BY AUTOMATED COUNT: 41.5 FL (ref 35.9–50)
GLOBULIN SER CALC-MCNC: 2.3 G/DL (ref 1.9–3.5)
GLUCOSE SERPL-MCNC: 84 MG/DL (ref 65–99)
HCT VFR BLD AUTO: 33.7 % (ref 37–47)
HGB BLD-MCNC: 11.5 G/DL (ref 12–16)
IMM GRANULOCYTES # BLD AUTO: 0.01 K/UL (ref 0–0.11)
IMM GRANULOCYTES NFR BLD AUTO: 0.2 % (ref 0–0.9)
LIPASE SERPL-CCNC: 19 U/L (ref 7–58)
LYMPHOCYTES # BLD AUTO: 0.86 K/UL (ref 1–4.8)
LYMPHOCYTES NFR BLD: 19.2 % (ref 22–41)
MCH RBC QN AUTO: 31.5 PG (ref 27–33)
MCHC RBC AUTO-ENTMCNC: 34.1 G/DL (ref 33.6–35)
MCV RBC AUTO: 92.3 FL (ref 81.4–97.8)
MONOCYTES # BLD AUTO: 0.44 K/UL (ref 0–0.85)
MONOCYTES NFR BLD AUTO: 9.8 % (ref 0–13.4)
NEUTROPHILS # BLD AUTO: 3.05 K/UL (ref 2–7.15)
NEUTROPHILS NFR BLD: 68.2 % (ref 44–72)
NRBC # BLD AUTO: 0 K/UL
NRBC BLD-RTO: 0 /100 WBC
PLATELET # BLD AUTO: 183 K/UL (ref 164–446)
PMV BLD AUTO: 10.4 FL (ref 9–12.9)
POTASSIUM SERPL-SCNC: 3.7 MMOL/L (ref 3.6–5.5)
PROT SERPL-MCNC: 5.5 G/DL (ref 6–8.2)
RBC # BLD AUTO: 3.65 M/UL (ref 4.2–5.4)
SODIUM SERPL-SCNC: 137 MMOL/L (ref 135–145)
WBC # BLD AUTO: 4.5 K/UL (ref 4.8–10.8)

## 2018-12-13 PROCEDURE — 160009 HCHG ANES TIME/MIN: Performed by: INTERNAL MEDICINE

## 2018-12-13 PROCEDURE — 85025 COMPLETE CBC W/AUTO DIFF WBC: CPT

## 2018-12-13 PROCEDURE — 700101 HCHG RX REV CODE 250

## 2018-12-13 PROCEDURE — 160048 HCHG OR STATISTICAL LEVEL 1-5: Performed by: INTERNAL MEDICINE

## 2018-12-13 PROCEDURE — 0F798ZZ DILATION OF COMMON BILE DUCT, VIA NATURAL OR ARTIFICIAL OPENING ENDOSCOPIC: ICD-10-PCS | Performed by: INTERNAL MEDICINE

## 2018-12-13 PROCEDURE — 700102 HCHG RX REV CODE 250 W/ 637 OVERRIDE(OP): Performed by: ANESTHESIOLOGY

## 2018-12-13 PROCEDURE — C1726 CATH, BAL DIL, NON-VASCULAR: HCPCS | Performed by: INTERNAL MEDICINE

## 2018-12-13 PROCEDURE — 500066 HCHG BITE BLOCK, ECT: Performed by: INTERNAL MEDICINE

## 2018-12-13 PROCEDURE — 700105 HCHG RX REV CODE 258: Performed by: INTERNAL MEDICINE

## 2018-12-13 PROCEDURE — A9270 NON-COVERED ITEM OR SERVICE: HCPCS | Performed by: ANESTHESIOLOGY

## 2018-12-13 PROCEDURE — 160203 HCHG ENDO MINUTES - 1ST 30 MINS LEVEL 4: Performed by: INTERNAL MEDICINE

## 2018-12-13 PROCEDURE — A9270 NON-COVERED ITEM OR SERVICE: HCPCS | Performed by: INTERNAL MEDICINE

## 2018-12-13 PROCEDURE — 160036 HCHG PACU - EA ADDL 30 MINS PHASE I: Performed by: INTERNAL MEDICINE

## 2018-12-13 PROCEDURE — 160002 HCHG RECOVERY MINUTES (STAT): Performed by: INTERNAL MEDICINE

## 2018-12-13 PROCEDURE — 770006 HCHG ROOM/CARE - MED/SURG/GYN SEMI*

## 2018-12-13 PROCEDURE — 700105 HCHG RX REV CODE 258: Performed by: HOSPITALIST

## 2018-12-13 PROCEDURE — 83690 ASSAY OF LIPASE: CPT

## 2018-12-13 PROCEDURE — 74328 X-RAY BILE DUCT ENDOSCOPY: CPT

## 2018-12-13 PROCEDURE — 160208 HCHG ENDO MINUTES - EA ADDL 1 MIN LEVEL 4: Performed by: INTERNAL MEDICINE

## 2018-12-13 PROCEDURE — 700102 HCHG RX REV CODE 250 W/ 637 OVERRIDE(OP): Performed by: INTERNAL MEDICINE

## 2018-12-13 PROCEDURE — 80053 COMPREHEN METABOLIC PANEL: CPT

## 2018-12-13 PROCEDURE — 110371 HCHG SHELL REV 272: Performed by: INTERNAL MEDICINE

## 2018-12-13 PROCEDURE — 36415 COLL VENOUS BLD VENIPUNCTURE: CPT

## 2018-12-13 PROCEDURE — A9270 NON-COVERED ITEM OR SERVICE: HCPCS | Performed by: HOSPITALIST

## 2018-12-13 PROCEDURE — 700111 HCHG RX REV CODE 636 W/ 250 OVERRIDE (IP): Performed by: ANESTHESIOLOGY

## 2018-12-13 PROCEDURE — C1769 GUIDE WIRE: HCPCS | Performed by: INTERNAL MEDICINE

## 2018-12-13 PROCEDURE — 503087: Performed by: INTERNAL MEDICINE

## 2018-12-13 PROCEDURE — 700111 HCHG RX REV CODE 636 W/ 250 OVERRIDE (IP)

## 2018-12-13 PROCEDURE — 700117 HCHG RX CONTRAST REV CODE 255

## 2018-12-13 PROCEDURE — 160035 HCHG PACU - 1ST 60 MINS PHASE I: Performed by: INTERNAL MEDICINE

## 2018-12-13 PROCEDURE — 700102 HCHG RX REV CODE 250 W/ 637 OVERRIDE(OP): Performed by: HOSPITALIST

## 2018-12-13 PROCEDURE — 700111 HCHG RX REV CODE 636 W/ 250 OVERRIDE (IP): Performed by: INTERNAL MEDICINE

## 2018-12-13 RX ORDER — CIPROFLOXACIN 2 MG/ML
400 INJECTION, SOLUTION INTRAVENOUS EVERY 12 HOURS
Status: DISCONTINUED | OUTPATIENT
Start: 2018-12-13 | End: 2018-12-14 | Stop reason: HOSPADM

## 2018-12-13 RX ORDER — SODIUM CHLORIDE, SODIUM LACTATE, POTASSIUM CHLORIDE, CALCIUM CHLORIDE 600; 310; 30; 20 MG/100ML; MG/100ML; MG/100ML; MG/100ML
INJECTION, SOLUTION INTRAVENOUS CONTINUOUS
Status: DISCONTINUED | OUTPATIENT
Start: 2018-12-13 | End: 2018-12-13

## 2018-12-13 RX ORDER — CIPROFLOXACIN 2 MG/ML
400 INJECTION, SOLUTION INTRAVENOUS EVERY 12 HOURS
Status: DISCONTINUED | OUTPATIENT
Start: 2018-12-13 | End: 2018-12-13

## 2018-12-13 RX ORDER — SODIUM CHLORIDE, SODIUM LACTATE, POTASSIUM CHLORIDE, CALCIUM CHLORIDE 600; 310; 30; 20 MG/100ML; MG/100ML; MG/100ML; MG/100ML
2000 INJECTION, SOLUTION INTRAVENOUS CONTINUOUS
Status: DISCONTINUED | OUTPATIENT
Start: 2018-12-13 | End: 2018-12-14 | Stop reason: HOSPADM

## 2018-12-13 RX ORDER — MIDAZOLAM HYDROCHLORIDE 1 MG/ML
1 INJECTION INTRAMUSCULAR; INTRAVENOUS
Status: DISCONTINUED | OUTPATIENT
Start: 2018-12-13 | End: 2018-12-13 | Stop reason: HOSPADM

## 2018-12-13 RX ORDER — DIPHENHYDRAMINE HYDROCHLORIDE 50 MG/ML
12.5 INJECTION INTRAMUSCULAR; INTRAVENOUS
Status: DISCONTINUED | OUTPATIENT
Start: 2018-12-13 | End: 2018-12-13 | Stop reason: HOSPADM

## 2018-12-13 RX ORDER — INDOMETHACIN 50 MG/1
100 SUPPOSITORY RECTAL ONCE
Status: COMPLETED | OUTPATIENT
Start: 2018-12-13 | End: 2018-12-13

## 2018-12-13 RX ORDER — SODIUM CHLORIDE, SODIUM LACTATE, POTASSIUM CHLORIDE, CALCIUM CHLORIDE 600; 310; 30; 20 MG/100ML; MG/100ML; MG/100ML; MG/100ML
1000 INJECTION, SOLUTION INTRAVENOUS CONTINUOUS
Status: DISCONTINUED | OUTPATIENT
Start: 2018-12-13 | End: 2018-12-13 | Stop reason: HOSPADM

## 2018-12-13 RX ORDER — MEPERIDINE HYDROCHLORIDE 25 MG/ML
6.25 INJECTION INTRAMUSCULAR; INTRAVENOUS; SUBCUTANEOUS
Status: DISCONTINUED | OUTPATIENT
Start: 2018-12-13 | End: 2018-12-13 | Stop reason: HOSPADM

## 2018-12-13 RX ORDER — MIDAZOLAM HYDROCHLORIDE 1 MG/ML
INJECTION INTRAMUSCULAR; INTRAVENOUS
Status: DISPENSED
Start: 2018-12-13 | End: 2018-12-14

## 2018-12-13 RX ORDER — FAMOTIDINE 20 MG/1
20 TABLET, FILM COATED ORAL 2 TIMES DAILY
Status: DISCONTINUED | OUTPATIENT
Start: 2018-12-13 | End: 2018-12-13 | Stop reason: HOSPADM

## 2018-12-13 RX ORDER — ONDANSETRON 2 MG/ML
4 INJECTION INTRAMUSCULAR; INTRAVENOUS
Status: COMPLETED | OUTPATIENT
Start: 2018-12-13 | End: 2018-12-13

## 2018-12-13 RX ORDER — HALOPERIDOL 5 MG/ML
1 INJECTION INTRAMUSCULAR
Status: DISCONTINUED | OUTPATIENT
Start: 2018-12-13 | End: 2018-12-13 | Stop reason: HOSPADM

## 2018-12-13 RX ORDER — DEXAMETHASONE SODIUM PHOSPHATE 4 MG/ML
6 INJECTION, SOLUTION INTRA-ARTICULAR; INTRALESIONAL; INTRAMUSCULAR; INTRAVENOUS; SOFT TISSUE ONCE
Status: COMPLETED | OUTPATIENT
Start: 2018-12-13 | End: 2018-12-13

## 2018-12-13 RX ORDER — OXYCODONE HYDROCHLORIDE AND ACETAMINOPHEN 5; 325 MG/1; MG/1
1 TABLET ORAL
Status: DISCONTINUED | OUTPATIENT
Start: 2018-12-13 | End: 2018-12-13 | Stop reason: HOSPADM

## 2018-12-13 RX ORDER — OXYCODONE HYDROCHLORIDE AND ACETAMINOPHEN 5; 325 MG/1; MG/1
2 TABLET ORAL
Status: DISCONTINUED | OUTPATIENT
Start: 2018-12-13 | End: 2018-12-13 | Stop reason: HOSPADM

## 2018-12-13 RX ADMIN — CIPROFLOXACIN 400 MG: 2 INJECTION, SOLUTION INTRAVENOUS at 22:53

## 2018-12-13 RX ADMIN — FAMOTIDINE 20 MG: 10 INJECTION, SOLUTION INTRAVENOUS at 19:58

## 2018-12-13 RX ADMIN — DEXAMETHASONE SODIUM PHOSPHATE 6 MG: 4 INJECTION, SOLUTION INTRAMUSCULAR; INTRAVENOUS at 19:55

## 2018-12-13 RX ADMIN — HALOPERIDOL LACTATE 1 MG: 5 INJECTION, SOLUTION INTRAMUSCULAR at 19:28

## 2018-12-13 RX ADMIN — ONDANSETRON 4 MG: 2 INJECTION INTRAMUSCULAR; INTRAVENOUS at 19:05

## 2018-12-13 RX ADMIN — DOCUSATE SODIUM AND SENNOSIDES 2 TABLET: 8.6; 5 TABLET, FILM COATED ORAL at 05:39

## 2018-12-13 RX ADMIN — ASPIRIN 81 MG: 81 TABLET, COATED ORAL at 05:38

## 2018-12-13 RX ADMIN — LISINOPRIL 5 MG: 5 TABLET ORAL at 05:39

## 2018-12-13 RX ADMIN — SODIUM CHLORIDE: 9 INJECTION, SOLUTION INTRAVENOUS at 05:44

## 2018-12-13 RX ADMIN — FENTANYL CITRATE 25 MCG: 50 INJECTION, SOLUTION INTRAMUSCULAR; INTRAVENOUS at 20:21

## 2018-12-13 RX ADMIN — FENTANYL CITRATE 25 MCG: 50 INJECTION, SOLUTION INTRAMUSCULAR; INTRAVENOUS at 20:08

## 2018-12-13 RX ADMIN — SODIUM CHLORIDE, POTASSIUM CHLORIDE, SODIUM LACTATE AND CALCIUM CHLORIDE: 600; 310; 30; 20 INJECTION, SOLUTION INTRAVENOUS at 22:52

## 2018-12-13 RX ADMIN — SODIUM CHLORIDE, POTASSIUM CHLORIDE, SODIUM LACTATE AND CALCIUM CHLORIDE 2000 ML: 600; 310; 30; 20 INJECTION, SOLUTION INTRAVENOUS at 22:52

## 2018-12-13 RX ADMIN — LEVOTHYROXINE SODIUM 100 MCG: 100 TABLET ORAL at 05:38

## 2018-12-13 RX ADMIN — INDOMETHACIN 100 MG: 50 SUPPOSITORY RECTAL at 19:11

## 2018-12-13 ASSESSMENT — PAIN SCALES - GENERAL
PAINLEVEL_OUTOF10: 3
PAINLEVEL_OUTOF10: 4
PAINLEVEL_OUTOF10: 0
PAINLEVEL_OUTOF10: ASSUMED PAIN PRESENT
PAINLEVEL_OUTOF10: 0
PAINLEVEL_OUTOF10: 1
PAINLEVEL_OUTOF10: 0
PAINLEVEL_OUTOF10: 5
PAINLEVEL_OUTOF10: 0
PAINLEVEL_OUTOF10: 1

## 2018-12-13 ASSESSMENT — ENCOUNTER SYMPTOMS
PALPITATIONS: 0
NAUSEA: 0
CONSTIPATION: 1
COUGH: 0
VOMITING: 0
FEVER: 0
CHILLS: 0
SHORTNESS OF BREATH: 0
ABDOMINAL PAIN: 0

## 2018-12-13 NOTE — PROGRESS NOTES
Gastroenterology Progress Note     Author: Christopher Schaffer   Date & Time Created: 12/13/2018 8:32 AM    Chief Complaint:  Bile duct stricture    Interval History:  Clementine is a 73 year old woman living at [facility] who was transferred here for evaluation of acute onset epigastric pain radiating into the back, abnormal liver tests, and imaging to suggest a proximal bile duct stricture.  She has a history of bile duct repair at age 8.    Course:  12/13/2018:  She has been pain free since admission and her only complaint at this time is a sore low back from laying in a soft bed.  She is scheduled for ERCP today by Dr. Brandon at 4:30 pm.    Review of Systems:  Review of Systems   Constitutional: Negative for chills and fever.   Respiratory: Negative for cough and shortness of breath.    Cardiovascular: Negative for chest pain and palpitations.   Gastrointestinal: Positive for constipation (She hasn't had a bowel movement for a few days, but isn't uncomfortable.). Negative for abdominal pain, nausea and vomiting.       Physical Exam:  Physical Exam   Constitutional: She is oriented to person, place, and time. No distress.   Eyes: No scleral icterus.   Cardiovascular: Regular rhythm and normal heart sounds.    No murmur heard.  Pulmonary/Chest: Breath sounds normal. She has no wheezes. She has no rales.   Abdominal: Soft. Bowel sounds are normal. She exhibits no distension and no mass. There is no tenderness.   Neurological: She is alert and oriented to person, place, and time.       Labs:          Recent Labs      12/12/18   0532  12/12/18   1755  12/13/18   0514   SODIUM  137   --   137   POTASSIUM  3.8   --   3.7   CHLORIDE  108   --   109   CO2  24   --   22   BUN  5*   --   6*   CREATININE  0.79   --   0.71   MAGNESIUM   --   1.9   --    CALCIUM  8.6   --   8.2*     Recent Labs      12/12/18   0532  12/13/18   0514   ALTSGPT  585*  344*   ASTSGOT  355*  127*   ALKPHOSPHAT  260*  212*   TBILIRUBIN  2.1*  1.4   LIPASE    --   19   GLUCOSE  94  84     Recent Labs      18   0532  18   0514   RBC  3.94*  3.65*   HEMOGLOBIN  12.4  11.5*   HEMATOCRIT  36.6*  33.7*   PLATELETCT  194  183     Recent Labs      18   0532  18   0514   WBC  4.2*  4.5*   NEUTSPOLYS  74.20*  68.20   LYMPHOCYTES  15.20*  19.20*   MONOCYTES  7.80  9.80   EOSINOPHILS  1.90  2.20   BASOPHILS  0.70  0.40   ASTSGOT  355*  127*   ALTSGPT  585*  344*   ALKPHOSPHAT  260*  212*   TBILIRUBIN  2.1*  1.4     Hemodynamics:  Temp (24hrs), Av.6 °C (97.9 °F), Min:36.4 °C (97.5 °F), Max:36.8 °C (98.2 °F)  Temperature: 36.4 °C (97.5 °F)  Pulse  Av.6  Min: 54  Max: 69   Blood Pressure : 160/62     Respiratory:    Respiration: 18, Pulse Oximetry: 96 %           Fluids:    Intake/Output Summary (Last 24 hours) at 18 0832  Last data filed at 18 0400   Gross per 24 hour   Intake              800 ml   Output              400 ml   Net              400 ml        GI/Nutrition:  Orders Placed This Encounter   Procedures   • Diet NPO     Standing Status:   Standing     Number of Occurrences:   1     Order Specific Question:   Restrict to:     Answer:   Sips with Medications [3]     Imaging:  US Abdomen: focal dilatation of the distal CBD c/w obstructing stone versus type I choledochoal cyst, no gallstones seen, slight liver heterogeneous echogenicity.  MRCP: distended GB, normal CBD size without stones, and cyclic/beaded extrahepatic ductal dilatation with focal intraluminal stricture at junction between extrahepatic ducts and proximal CBD.      IMPRESSION:  73 year old woman who presents with history of acute onset epigastric pain who has new elevation of liver tests and evidence of proximal CBD stricture.  Given history of bile duct repair as a child, she may have a post-operative stricture.  The fact that she also had pain suggests acute biliary obstruction, perhaps by a stone.  Cholangiocarcinoma is also on the differential, but would be less  likely to present as acute onset pain.  ERCP is indicated for further evaluation and treatment.     #  Obstructive jaundice associated with spike of liver enzymes c/w acute biliary obstruction.  Pain has resolved and liver tests are trending down.  #  Epigastric pain, resolved.  #  Abnormal biliary imaging suggesting focal stricture at junction of proximal CBD with the extrahepatic ducts.  Possibilities: post-operative stricture, sclerosing cholangitis, or cholangiocarcinoma.  #  History of bile duct stenosis requiring surgery at age 8.  #  Other problems: HTN, hypothyroidism.     RECOMMENDATION:  - ERCP is scheduled for today at 4:30 pm by Dr. Brandon.  I once again went over the risks, benefits and alternatives of the procedure and she agrees to proceed.  - Nursing staff advised to hold Lovenox today.    Quality-Core Measures

## 2018-12-13 NOTE — PROGRESS NOTES
Indication:Abnormal MRCP, elevated LFT.   Risks, benefits, and alternatives were discussed with with consenting person(s). Consenting person(s) were given an opportunity to ask questions and discuss other options. Risks including but not limited to failed or incomplete ERCP, stent migration, ineffective therapy, radiation exposure, pancreatitis (with potential future complications), contrast reaction, perforation, infection, bleeding, missed lesion(s), cardiac and/or pulmonary event, aspiration, stroke, possible need for surgery, hospitalization possibly prolonged, discomfort, unsuccessful and/or incomplete procedure, possible need for repeat procedures and/or additional testings, damage to adjacent organs and/or vascular structures, medication reaction, disability, death, and other adverse events possibly life-threatening. Discussion was undertaken with Layman's terms. Consenting persons stated understanding and acceptance of these risks, and wished to proceed. Consent was given in clear state of mind.

## 2018-12-13 NOTE — PROGRESS NOTES
Hospital Medicine Daily Progress Note    Date of Service  12/12/2018    Chief Complaint  73 y.o. female admitted 12/11/2018 with abdominal pain    Hospital Course    73 y.o. female who presented 12/11/2018 with abdominal pain 2 days ago to a hospital in [place]. She was found to have evidence of obstructive jaundice. Her pain subsided but her labs worsened. An MRCP was done and shows a common bile duct dilation. Ultrasound and with remainder of her workup were not consistent with cholecystitis. She has a remote history of a bile duct obstruction at age 8 that required surgery to repair. She has been transferred here for ercp and possible dilation of a suspected stricture.       Interval Problem Update  She feels much better, no nausea vomiting or abdominal pain and denies any jaundice.  Eagerly awaiting ERCP scheduling.    Consultants/Specialty  Gastroenterology, discussed with Dr. Schaffer.    Code Status  Full code.    Disposition  Home.    Review of Systems  Review of Systems   Constitutional: Negative.  Negative for chills, fever, malaise/fatigue and weight loss.   HENT: Negative.    Respiratory: Negative.  Negative for cough and shortness of breath.    Cardiovascular: Negative.  Negative for chest pain and leg swelling.   Gastrointestinal: Negative.  Negative for abdominal pain, nausea and vomiting.   Genitourinary: Negative.  Negative for dysuria and flank pain.   Musculoskeletal: Negative.  Negative for back pain and myalgias.   Neurological: Negative.  Negative for dizziness, loss of consciousness and weakness.   Endo/Heme/Allergies: Negative.  Does not bruise/bleed easily.   Psychiatric/Behavioral: Negative.  Negative for depression. The patient is not nervous/anxious.    All other systems reviewed and are negative.       Physical Exam  Temp:  [36.6 °C (97.9 °F)-37.3 °C (99.1 °F)] 36.8 °C (98.2 °F)  Pulse:  [55-62] 55  Resp:  [18] 18  BP: (117-155)/(47-59) 146/50    Physical Exam   Constitutional: She appears  well-developed and well-nourished. No distress.   Plan of care discussed with bedside RN.   HENT:   Nose: Nose normal.   Mouth/Throat: Oropharynx is clear and moist. No oropharyngeal exudate.   Eyes: Conjunctivae are normal. Right eye exhibits no discharge. Left eye exhibits no discharge. No scleral icterus.   Neck: No JVD present. No tracheal deviation present.   Cardiovascular: Normal rate, regular rhythm and normal heart sounds.    Pulmonary/Chest: Effort normal and breath sounds normal. No stridor. No respiratory distress. She has no wheezes. She has no rales. She exhibits no tenderness.   Abdominal: Soft. Bowel sounds are normal. She exhibits no distension. There is no tenderness.   Musculoskeletal: She exhibits no edema or tenderness.   Neurological: She is alert. No cranial nerve deficit. She exhibits normal muscle tone.   Skin: Skin is warm and dry. She is not diaphoretic. No pallor.   Psychiatric: She has a normal mood and affect. Her behavior is normal.   Nursing note and vitals reviewed.      Fluids    Intake/Output Summary (Last 24 hours) at 12/12/18 1647  Last data filed at 12/12/18 1600   Gross per 24 hour   Intake             1300 ml   Output                1 ml   Net             1299 ml       Laboratory  Recent Labs      12/12/18   0532   WBC  4.2*   RBC  3.94*   HEMOGLOBIN  12.4   HEMATOCRIT  36.6*   MCV  92.9   MCH  31.5   MCHC  33.9   RDW  42.7   PLATELETCT  194   MPV  10.1     Recent Labs      12/12/18   0532   SODIUM  137   POTASSIUM  3.8   CHLORIDE  108   CO2  24   GLUCOSE  94   BUN  5*   CREATININE  0.79   CALCIUM  8.6                   Imaging  No orders to display   MRCP from [facility] results reviewed, no retained stones seen.    Assessment/Plan  Obstructive jaundice   Assessment & Plan    Trend labs  GI consulted for ercp- gi consultants were contacted [facility] and made aware that patient is here.   Pain control  hopefully this is just a stricture given her surgical history in  The last.  She has no symptoms of cholecystitis. Other common diagnoses may be a passed stone but not seen on mrcp and labs are worsening.      Hypothyroid   Assessment & Plan    Continue home dose of levothyroxine at 100 mcg per day.       Hypertension   Assessment & Plan    Well-controlled continue home dose of lisinopril.          VTE prophylaxis: SCDs

## 2018-12-13 NOTE — PROGRESS NOTES
Report received at change of shift, care of pt assumed. Pt resting comfortably in bed watching a movie on her iPad. Denies any pain and offers no complaints at this time. Updated on plan of NPO at midnight for an ERCP at 4:30 PM tomorrow. Pt agrees with the plan.

## 2018-12-13 NOTE — CARE PLAN
Problem: Communication  Goal: The ability to communicate needs accurately and effectively will improve  Outcome: PROGRESSING AS EXPECTED  Discussed NPO status and plan of ERCP today at 1630. Encouraged pt to ask questions.    Problem: Safety  Goal: Will remain free from injury  Outcome: PROGRESSING AS EXPECTED  Call light and personal belongings within reach, bed in low locked position, treaded slipper socks in place, room free of clutter. Hourly rounding in place to ensure pt safety and needs are met.

## 2018-12-13 NOTE — CARE PLAN
Problem: Safety  Goal: Will remain free from falls  Outcome: PROGRESSING AS EXPECTED  Pt instructed on importance of using call light and call light placed within reach.     Problem: Knowledge Deficit  Goal: Knowledge of disease process/condition, treatment plan, diagnostic tests, and medications will improve  Outcome: PROGRESSING AS EXPECTED  Discussed with patient the importance of handwashing. Pt demonstrates proper handwashing.

## 2018-12-14 ENCOUNTER — APPOINTMENT (OUTPATIENT)
Dept: RADIOLOGY | Facility: MEDICAL CENTER | Age: 73
DRG: 446 | End: 2018-12-14
Attending: INTERNAL MEDICINE
Payer: MEDICARE

## 2018-12-14 ENCOUNTER — PATIENT OUTREACH (OUTPATIENT)
Dept: HEALTH INFORMATION MANAGEMENT | Facility: OTHER | Age: 73
End: 2018-12-14

## 2018-12-14 VITALS
OXYGEN SATURATION: 100 % | TEMPERATURE: 98.6 F | WEIGHT: 129.85 LBS | SYSTOLIC BLOOD PRESSURE: 121 MMHG | RESPIRATION RATE: 18 BRPM | HEIGHT: 64 IN | BODY MASS INDEX: 22.17 KG/M2 | DIASTOLIC BLOOD PRESSURE: 79 MMHG | HEART RATE: 54 BPM

## 2018-12-14 LAB
ALBUMIN SERPL BCP-MCNC: 3.3 G/DL (ref 3.2–4.9)
ALBUMIN/GLOB SERPL: 1.4 G/DL
ALP SERPL-CCNC: 204 U/L (ref 30–99)
ALT SERPL-CCNC: 253 U/L (ref 2–50)
ANION GAP SERPL CALC-SCNC: 12 MMOL/L (ref 0–11.9)
AST SERPL-CCNC: 68 U/L (ref 12–45)
BILIRUB SERPL-MCNC: 1.4 MG/DL (ref 0.1–1.5)
BUN SERPL-MCNC: 8 MG/DL (ref 8–22)
CALCIUM SERPL-MCNC: 8.1 MG/DL (ref 8.4–10.2)
CHLORIDE SERPL-SCNC: 105 MMOL/L (ref 96–112)
CO2 SERPL-SCNC: 16 MMOL/L (ref 20–33)
CREAT SERPL-MCNC: 0.79 MG/DL (ref 0.5–1.4)
GLOBULIN SER CALC-MCNC: 2.4 G/DL (ref 1.9–3.5)
GLUCOSE SERPL-MCNC: 113 MG/DL (ref 65–99)
LIPASE SERPL-CCNC: 23 U/L (ref 7–58)
POTASSIUM SERPL-SCNC: 3.9 MMOL/L (ref 3.6–5.5)
PROT SERPL-MCNC: 5.7 G/DL (ref 6–8.2)
SODIUM SERPL-SCNC: 133 MMOL/L (ref 135–145)

## 2018-12-14 PROCEDURE — 700105 HCHG RX REV CODE 258: Performed by: INTERNAL MEDICINE

## 2018-12-14 PROCEDURE — 700117 HCHG RX CONTRAST REV CODE 255: Performed by: HOSPITALIST

## 2018-12-14 PROCEDURE — 36415 COLL VENOUS BLD VENIPUNCTURE: CPT

## 2018-12-14 PROCEDURE — 700111 HCHG RX REV CODE 636 W/ 250 OVERRIDE (IP): Performed by: INTERNAL MEDICINE

## 2018-12-14 PROCEDURE — 99239 HOSP IP/OBS DSCHRG MGMT >30: CPT | Performed by: HOSPITALIST

## 2018-12-14 PROCEDURE — 74170 CT ABD WO CNTRST FLWD CNTRST: CPT

## 2018-12-14 PROCEDURE — 80053 COMPREHEN METABOLIC PANEL: CPT

## 2018-12-14 PROCEDURE — 83690 ASSAY OF LIPASE: CPT

## 2018-12-14 RX ORDER — CIPROFLOXACIN 2 MG/ML
INJECTION, SOLUTION INTRAVENOUS
Status: COMPLETED
Start: 2018-12-14 | End: 2018-12-14

## 2018-12-14 RX ORDER — CIPROFLOXACIN 500 MG/1
500 TABLET, FILM COATED ORAL 2 TIMES DAILY
Qty: 10 TAB | Refills: 0 | Status: SHIPPED | OUTPATIENT
Start: 2018-12-14 | End: 2018-12-19

## 2018-12-14 RX ADMIN — SODIUM CHLORIDE, POTASSIUM CHLORIDE, SODIUM LACTATE AND CALCIUM CHLORIDE 1000 ML: 600; 310; 30; 20 INJECTION, SOLUTION INTRAVENOUS at 13:05

## 2018-12-14 RX ADMIN — IOHEXOL 100 ML: 350 INJECTION, SOLUTION INTRAVENOUS at 13:45

## 2018-12-14 RX ADMIN — CIPROFLOXACIN 400 MG: 2 INJECTION, SOLUTION INTRAVENOUS at 06:23

## 2018-12-14 RX ADMIN — SODIUM CHLORIDE, POTASSIUM CHLORIDE, SODIUM LACTATE AND CALCIUM CHLORIDE 1000 ML: 600; 310; 30; 20 INJECTION, SOLUTION INTRAVENOUS at 04:11

## 2018-12-14 ASSESSMENT — ENCOUNTER SYMPTOMS
ABDOMINAL PAIN: 0
VOMITING: 0
CHILLS: 0
HEARTBURN: 1
NAUSEA: 0
FEVER: 0

## 2018-12-14 NOTE — DISCHARGE INSTRUCTIONS
Discharge Instructions    Discharged to home by car with relative. Discharged via wheelchair, hospital escort: Yes.  Special equipment needed: Not Applicable    Be sure to schedule a follow-up appointment with your primary care doctor or any specialists as instructed.     Discharge Plan:   Influenza Vaccine Indication: Patient Refuses (previously immunized in 2017)    I understand that a diet low in cholesterol, fat, and sodium is recommended for good health. Unless I have been given specific instructions below for another diet, I accept this instruction as my diet prescription.   Other diet: Low Fat    Special Instructions: None    · Is patient discharged on Warfarin / Coumadin?   No     Low-Fat Diet for Pancreatitis or Gallbladder Conditions  A low-fat diet can be helpful if you have pancreatitis or a gallbladder condition. With these conditions, your pancreas and gallbladder have trouble digesting fats. A healthy eating plan with less fat will help rest your pancreas and gallbladder and reduce your symptoms.  What do I need to know about this diet?  · Eat a low-fat diet.  ¨ Reduce your fat intake to less than 20-30% of your total daily calories. This is less than 50-60 g of fat per day.  ¨ Remember that you need some fat in your diet. Ask your dietician what your daily goal should be.  ¨ Choose nonfat and low-fat healthy foods. Look for the words “nonfat,” “low fat,” or “fat free.”  ¨ As a guide, look on the label and choose foods with less than 3 g of fat per serving. Eat only one serving.  · Avoid alcohol.  · Do not smoke. If you need help quitting, talk with your health care provider.  · Eat small frequent meals instead of three large heavy meals.  What foods can I eat?  Grains   Include healthy grains and starches such as potatoes, wheat bread, fiber-rich cereal, and brown rice. Choose whole grain options whenever possible. In adults, whole grains should account for 45-65% of your daily calories.  Fruits and  Vegetables   Eat plenty of fruits and vegetables. Fresh fruits and vegetables add fiber to your diet.  Meats and Other Protein Sources   Eat lean meat such as chicken and pork. Trim any fat off of meat before cooking it. Eggs, fish, and beans are other sources of protein. In adults, these foods should account for 10-35% of your daily calories.  Dairy   Choose low-fat milk and dairy options. Dairy includes fat and protein, as well as calcium.  Fats and Oils   Limit high-fat foods such as fried foods, sweets, baked goods, sugary drinks.  Other   Creamy sauces and condiments, such as mayonnaise, can add extra fat. Think about whether or not you need to use them, or use smaller amounts or low fat options.  What foods are not recommended?  · High fat foods, such as:  ¨ Baked goods.  ¨ Ice cream.  ¨ [place]ian toast.  ¨ Sweet rolls.  ¨ Pizza.  ¨ Cheese bread.  ¨ Foods covered with batter, butter, creamy sauces, or cheese.  ¨ Fried foods.  ¨ Sugary drinks and desserts.  · Foods that cause gas or bloating  This information is not intended to replace advice given to you by your health care provider. Make sure you discuss any questions you have with your health care provider.  Document Released: 12/23/2014 Document Revised: 05/25/2017 Document Reviewed: 12/01/2014  [employer] Interactive Patient Education © 2017 [employer]      Acute Pancreatitis  Introduction  Acute pancreatitis is a condition in which the pancreas suddenly gets irritated and swollen (has inflammation). The pancreas is a large gland behind the stomach. It makes enzymes that help to digest food. The pancreas also makes hormones that help to control your blood sugar. Acute pancreatitis happens when the enzymes attack the pancreas and damage it. Most attacks last a couple of days and can cause serious problems.  Follow these instructions at home:  Eating and drinking  · Follow instructions from your doctor about diet. You may need to:  ¨ Avoid alcohol.  ¨ Limit  how much fat is in your diet.  · Eat small meals often. Avoid eating big meals.  · Drink enough fluid to keep your pee (urine) clear or pale yellow.  · Do not drink alcohol if it caused your condition.  General instructions  · Take over-the-counter and prescription medicines only as told by your doctor.  · Do not use any tobacco products. These include cigarettes, chewing tobacco, and e-cigarettes. If you need help quitting, ask your doctor.  · Get plenty of rest.  · If directed, check your blood sugar at home as told by your doctor.  · Keep all follow-up visits as told by your doctor. This is important.  Contact a doctor if:  · You do not get better as quickly as expected.  · You have new symptoms.  · Your symptoms get worse.  · You have lasting pain or weakness.  · You continue to feel sick to your stomach (nauseous).  · You get better and then you have another pain attack.  · You have a fever.  Get help right away if:  · You cannot eat or keep fluids down.  · Your pain becomes very bad.  · Your skin or the white part of your eyes turns yellow (jaundice).  · You throw up (vomit).  · You feel dizzy or you pass out (faint).  · Your blood sugar is high (over 300 mg/dL).  This information is not intended to replace advice given to you by your health care provider. Make sure you discuss any questions you have with your health care provider.  Document Released: 06/05/2009 Document Revised: 05/25/2017 Document Reviewed: 09/20/2016  © 2017 [employer]    Depression / Suicide Risk    As you are discharged from this [facility] facility, it is important to learn how to keep safe from harming yourself.    Recognize the warning signs:  · Abrupt changes in personality, positive or negative- including increase in energy   · Giving away possessions  · Change in eating patterns- significant weight changes-  positive or negative  · Change in sleeping patterns- unable to sleep or sleeping all the time   · Unwillingness or inability  to communicate  · Depression  · Unusual sadness, discouragement and loneliness  · Talk of wanting to die  · Neglect of personal appearance   · Rebelliousness- reckless behavior  · Withdrawal from people/activities they love  · Confusion- inability to concentrate     If you or a loved one observes any of these behaviors or has concerns about self-harm, here's what you can do:  · Talk about it- your feelings and reasons for harming yourself  · Remove any means that you might use to hurt yourself (examples: pills, rope, extension cords, firearm)  · Get professional help from the community (Mental Health, Substance Abuse, psychological counseling)  · Do not be alone:Call your Safe Contact- someone whom you trust who will be there for you.  · Call your local CRISIS HOTLINE 773-7549 or 377-933-2200  · Call your local Children's Mobile Crisis Response Team [place] (042) 458-5070 or www.Victiv  · Call the toll free National Suicide Prevention Hotlines   · National Suicide Prevention Lifeline 108-883-RGSP (8750)  · National Hope Line Network 800-SUICIDE (204-8321)

## 2018-12-14 NOTE — OR NURSING
"1850 To PACU from OR via gurney, side rails up x 2 for safety, lungs clear bilaterally, pt arouses to voice but eyes close quickly with stimulation. Breathing easy and unlabored. BS hypoactive x 4Q.   1905 Arouses to voice. Given Zofran at this time due to c/o nausea; no emesis. Denies pain. 1L LR bolus started per orders. Abdomen soft and non-tender.  1920 Resting quietly on left side; denies pain and reports nausea as continued unchanged. Will plan to give IV Haldol for comfort.   1935 Pt c/o continued nausea; denies any change.   1950 BP elevated during IV LR bolus.   2005 Pt moaning; reports pain as 5/10, moderate and \"right in the center of my stomach and through my back\" at this time. Reports nausea improved.    2020 Call to Dr Brandon regarding c/o mid abdominal pain as described above of 5/10. Nausea improved.   2035 Pt sleepy but arousable. Pt reports pain as improved and denies nausea. Resting quietly on gurney.   2040 Meets criteria for transfer to [facility]; pending report to RN.         "

## 2018-12-14 NOTE — PROGRESS NOTES
Pt to floor, sleeping, easy to arouse, vss, safety precautions in place, denies pain, educated to continue of NPO, verbalized understanding, will continue rounding.

## 2018-12-14 NOTE — OR NURSING
2040- Patient resting in bed. Patient mouth swabbed by nurse.     2055- Patient resting in bed, no distress noted at this time. Patient states that abdominal discomfort is much better at this time.    2107- Report called to general surgical unit

## 2018-12-14 NOTE — DISCHARGE PLANNING
This LSW is following this pt through this admission for safe discharge plan. No needs noted at this time and daily assessments with all care providers completed to anticipate future discharge needs.      Care Transition Team Assessment      Elopement Risk  Legal Hold: No  Ambulatory or Self Mobile in Wheelchair: Yes  Disoriented: No  Psychiatric Symptoms: None  History of Wandering: No  Elopement this Admit: No  Vocalizing Wanting to Leave: No  Displays Behaviors, Body Language Wanting to Leave: No-Not at Risk for Elopement  Elopement Risk: Not at Risk for Elopement    Interdisciplinary Discharge Planning  Patient or legal guardian wants to designate a caregiver (see row info): No

## 2018-12-14 NOTE — DISCHARGE SUMMARY
Discharge Summary    CHIEF COMPLAINT ON ADMISSION  No chief complaint on file.      Reason for Admission  Common Bile Duct Obstruction     Admission Date  12/11/2018    CODE STATUS  Full Code    HPI & HOSPITAL COURSE      73 y.o. female who presented 12/11/2018 with abdominal pain 2 days ago to a hospital in [place]. She was found to have evidence of obstructive jaundice. Her pain subsided but her labs worsened. An MRCP was done and shows a common bile duct dilation. Ultrasound and with remainder of her workup were not consistent with cholecystitis. She has a remote history of a bile duct obstruction at age 8 that required surgery to repair. She has been transferred here for ercp and possible dilation of a suspected stricture.  Patient was evaluated with ERCP and the results are as below.  She had very tight opening difficult to pass the wire.  This is suggestive of a stricture.  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  This was relayed to Dr. Brandon. She is referred for endoscopic ultrasound by Dr. Brandon after which she will be referred to Dr. Pablo Cannon for further evaluation of the biliary tree if necessary.  Patient is feeling well she is eating and drinking normally ambulating without difficulty and is in not in any pain.    Therefore, she is discharged in good and stable condition to home with close outpatient follow-up.    The patient met 2-midnight criteria for an inpatient stay at the time of discharge.    Discharge Date  12/14/18    FOLLOW UP ITEMS POST DISCHARGE  Primary care  GI for endoscopic ultrasound  Dr. Cannon.    DISCHARGE DIAGNOSES  Active Problems:    Hypertension POA: Unknown    Hypothyroid POA: Unknown    Obstructive jaundice POA:  Unknown  Resolved Problems:    * No resolved hospital problems. *      FOLLOW UP  No future appointments.  No follow-up provider specified.    MEDICATIONS ON DISCHARGE     Medication List      START taking these medications      Instructions   ciprofloxacin 500 MG Tabs  Commonly known as:  CIPRO   Take 1 Tab by mouth 2 times a day for 5 days.  Dose:  500 mg        CONTINUE taking these medications      Instructions   aspirin EC 81 MG Tbec  Commonly known as:  ECOTRIN   Doctor's comments:  Resume taking 12/16  Take 1 Tab by mouth every day.  Dose:  81 mg     atorvastatin 40 MG Tabs  Commonly known as:  LIPITOR   Take 40 mg by mouth every evening.  Dose:  40 mg     levothyroxine 100 MCG Tabs  Commonly known as:  SYNTHROID   Take 100 mcg by mouth every day.  Dose:  100 mcg     lisinopril 5 MG Tabs  Commonly known as:  PRINIVIL   Take 5 mg by mouth every day.  Dose:  5 mg     vitamin B-12 1000 MCG Tabs   Take 2,000 mcg by mouth every day.  Dose:  2000 mcg     Vitamin D3 2000 UNIT Caps   Take 2,000 Units by mouth every day.  Dose:  2000 Units            Allergies  No Known Allergies    DIET  Orders Placed This Encounter   Procedures   • Diet NPO     Standing Status:   Standing     Number of Occurrences:   1     Order Specific Question:   Restrict to:     Answer:   Strict [1]       ACTIVITY  As tolerated.  Weight bearing as tolerated    CONSULTATIONS  Gi, Dr. Schaffer, Dr. [facility].    PROCEDURES  Impressions:   1.  Extremely tight common hepatic duct narrowing unable to pass any ERCP device through except 0.035 wire.  2.  Patent cystic duct and gallbladder  3.  Biliary sphincterotomy; ampulla adjacent to diverticulum     Recommendations:   1.  Start ciprofloxacin empirically for 5 days due to filling of gallbladder.  2.  Monitor for post ERCP pancreatitis, acute cholecystitis, fever, chills, pain  3. NPO for 4 hours and if no pain, advance diet as tolerated  4. Avoid anticoagulation for 3 days to decrease  sphincterotomy bleed (lovenox discontinued); consider SCD.  5. Indomethacin 100mg suppository and 1L lactated ringer to decrease pancreatitis risk  6. Discussed options with Dr. Schaffer and family    LABORATORY  Lab Results   Component Value Date    SODIUM 133 (L) 12/14/2018    POTASSIUM 3.9 12/14/2018    CHLORIDE 105 12/14/2018    CO2 16 (L) 12/14/2018    GLUCOSE 113 (H) 12/14/2018    BUN 8 12/14/2018    CREATININE 0.79 12/14/2018        Lab Results   Component Value Date    WBC 4.5 (L) 12/13/2018    HEMOGLOBIN 11.5 (L) 12/13/2018    HEMATOCRIT 33.7 (L) 12/13/2018    PLATELETCT 183 12/13/2018        Total time of the discharge process exceeds 40 minutes.

## 2018-12-14 NOTE — PROGRESS NOTES
Gastroenterology Progress Note     Author: Christopher BELTRAN Karime   Date & Time Created: 12/14/2018 9:52 AM    Chief Complaint:  Bile duct stricture     Interval History:  Clementine is a 73 year old woman living at [facility] who was transferred here for evaluation of acute onset epigastric pain radiating into the back, abnormal liver tests, and imaging to suggest a proximal bile duct stricture.  She has a history of bile duct repair at age 8.     Course:  12/13/2018:  She has been pain free since admission and her only complaint at this time is a sore low back from laying in a soft bed.  She is scheduled for ERCP today by Dr. Brandon at 4:30 pm.  12/14/2018:  ERCP was performed yesterday and revealed a very tight common hepatic duct stricture which could not be traversed despite prolonged attempt with multiple devices.  Since it wasn't traversed, no tissue samples were obtained.  Today she feels fine and would like to go home.  I spoke with Dr. Cannon and he would be happy to see her to consider surgical approach, but recommended pancreatic protocol CT scan and upper EUS to rule out cholangiocarcinoma prior to visit.      Review of Systems:  Review of Systems   Constitutional: Negative for chills and fever.   Gastrointestinal: Positive for heartburn (Mild). Negative for abdominal pain, nausea and vomiting.       Physical Exam:  Physical Exam   Constitutional: She is oriented to person, place, and time.   Eyes: No scleral icterus.   Cardiovascular: Regular rhythm.    Murmur heard.  Pulmonary/Chest: Breath sounds normal. She has no wheezes. She has no rales.   Abdominal: Soft. Bowel sounds are normal. She exhibits no distension and no mass. There is no tenderness.   Neurological: She is alert and oriented to person, place, and time.       Labs:          Recent Labs      12/12/18   0532  12/12/18   1755  12/13/18   0514  12/14/18   0506   SODIUM  137   --   137  133*   POTASSIUM  3.8   --   3.7  3.9   CHLORIDE  108   --    109  105   CO2  24   --   22  16*   BUN  5*   --   6*  8   CREATININE  0.79   --   0.71  0.79   MAGNESIUM   --   1.9   --    --    CALCIUM  8.6   --   8.2*  8.1*     Recent Labs      18   0532  18   0514  18   0506   ALTSGPT  585*  344*  253*   ASTSGOT  355*  127*  68*   ALKPHOSPHAT  260*  212*  204*   TBILIRUBIN  2.1*  1.4  1.4   LIPASE   --   19  23   GLUCOSE  94  84  113*     Recent Labs      18   0532  18   0514   RBC  3.94*  3.65*   HEMOGLOBIN  12.4  11.5*   HEMATOCRIT  36.6*  33.7*   PLATELETCT  194  183     Recent Labs      18   0532  18   0514  18   0506   WBC  4.2*  4.5*   --    NEUTSPOLYS  74.20*  68.20   --    LYMPHOCYTES  15.20*  19.20*   --    MONOCYTES  7.80  9.80   --    EOSINOPHILS  1.90  2.20   --    BASOPHILS  0.70  0.40   --    ASTSGOT  355*  127*  68*   ALTSGPT  585*  344*  253*   ALKPHOSPHAT  260*  212*  204*   TBILIRUBIN  2.1*  1.4  1.4     Hemodynamics:  Temp (24hrs), Av.7 °C (98.1 °F), Min:36.5 °C (97.7 °F), Max:37.2 °C (98.9 °F)  Temperature: 36.6 °C (97.9 °F)  Pulse  Av.8  Min: 52  Max: 70Heart Rate (Monitored): (!) 59  Blood Pressure : 138/49     Respiratory:    Respiration: 18, Pulse Oximetry: 100 %     Work Of Breathing / Effort: Mild  RML Breath Sounds: Diminished, RLL Breath Sounds: Diminished, LLL Breath Sounds: Diminished  Fluids:    Intake/Output Summary (Last 24 hours) at 18 0952  Last data filed at 18 0500   Gross per 24 hour   Intake             2000 ml   Output             1900 ml   Net              100 ml        GI/Nutrition:  Imaging:  US Abdomen: focal dilatation of the distal CBD c/w obstructing stone versus type I choledochoal cyst, no gallstones seen, slight liver heterogeneous echogenicity.  MRCP: distended GB, normal CBD size without stones, and cyclic/beaded extrahepatic ductal dilatation with focal intraluminal stricture at junction between extrahepatic ducts and proximal CBD.    ERCP:  1.  Extremely  "tight common hepatic duct narrowing which couldn't be traversed with any ERCP device except 0.035 wire.  2.  Patent cystic duct and gallbladder.  3.  Biliary sphincterotomy; ampulla adjacent to diverticulum.        IMPRESSION:  73 year old woman who presented with acute onset epigastric pain, new elevations of liver tests and evidence of proximal CHD stricture. She has a history of bile duct repair as a child.  ERCP revealed a very tight proximal common hepatic duct narrowing which could not be traversed with any device other than a 0.035\" wire.     #  Obstructive jaundice secondary to proximal common hepatic duct stenosis.  Primary differential is post-operative stricture with transient obstruction by biliary debris, cholangiocarcinoma, or much less likely a focal presentation of sclerosing cholangitis.  Her liver tests are steadily improving.  #  Epigastric pain, resolved.  #  History of bile duct stenosis requiring surgery at age 8.  #  Other problems: HTN, hypothyroidism.     RECOMMENDATION:  - CT scan of abdomen with pancreatic protocol today before discharge.  - Upper EUS with Dr. Brandon will be arranged.  - After the above is achieved, she will be referred to Dr. Cannon.  - She may go home today after CT scan.    I will sign off and make sure the above studies are achieved.  Please call for questions or concerns.    Quality-Core Measures  "

## 2018-12-14 NOTE — PROGRESS NOTES
Patient discharged to home with daughter via wheelchair and hospital escort at 1410. Discharge instructions reviewed with patient and daughter, all questions answered. IV site discontinued, catheter tip intact. All belongings sent home with patient.

## 2018-12-14 NOTE — PROCEDURES
Endoscopic Retrograde Cholangiopancreatography    Date of Procedure:  12/13/2018  Attending Physician:  Cristofer Brandon MD  Indications: Abnormal MRCP, biliary stricture        Instrument: Olympus Flexible Sideviewing Endoscope  Sedation:   Anesthesiologist/Type of Anesthesia:  Anesthesiologist: Rolanda Shepherd M.D./General    Surgical Staff:  Circulator: Dedrick Nassar R.N.  Endoscopy Technician: Tessa Brody; Pati Watts  Radiology Technologist: Dagoberto Romero    Pre-Anesthesia Assessment:  Prior to the procedure, a History and Physical was performed, and patient medications and allergies were reviewed. The patient’s tolerance of previous anesthesia was also reviewed. The risks and benefits of the procedure and the sedation options and risks were discussed with the patient including but not limited to infection, bleeding, aspiration, perforation, adverse medication reaction, missed diagnosis, missed lesions, and pancreatitis. The patient verbalized understanding. All questions were answered, and informed consent was obtained      After I obtained informed consent from the patient, the patient was placed in the prone/swimmer position. Appropriate time-out protocol was followed: the correct patient, the correct procedure, and the correct equipment in the room were confirmed. Throughout the procedure, the patient’s blood pressure, pulse, and oxygen saturations were monitored continuously. The Olymus flexible sideviewing duodenoscope was inserted through the oropharynx, esophagus intubated, then advanced to the gastrointestinal tract to the major papilla. The duct(s) were cannulated and contrast was injected I personally interpreted the ductal images.  Findings and interventions were performed and documented below. Air was then withdrawn and the duodenoscope was removed. The patient tolerated the procedure well. There were no immediate postoperative complications    Findings:    film was normal.  There were 1  duodenal diverticulum proximally in the second portion of duodenum.  The ampulla was adjacent to a periampullary diverticulum.  Cannulation with a 0.025 wire was sphincterotome was pure. Pancreatic duct was never cannulated or injected.  Wire advanced into the biliary system.  Cholangiogram demonstrated a wire was going towards cystic duct.  The common bile duct appears to be dilated.  This was approximately 10 mm in size.  A traction biliary sphincterotomy was performed to the edge.  No free air was noted under diaphragm. Occlusion cholangiogram with 12mm balloon to define anatomy demonstrated filling the cystic duct, filling of the gallbladder but no filling of the common hepatic duct.  Ultimately using a variety of methods the wire was able to enter proximal to the cystic duct into the right upper quadrant suggestive of passage into the intrahepatic duct system.  Attempt to pass a sphincterotome as well as a 9-12 mm extractor balloon was unsuccessful.  Occlusion cholangiogram demonstrated very minimal filling proximal of the area.  Prolonged attempt was performed utilizing a variety of different techniques and toward including a swing tip catheter with 4.5Fr tip; devices over a 0.035 wire, 4 mm Hurricaine balloon, as well as a 4Fr-6 Hungarian Soehendra dilator without success.  Despite prolonged attempt of over 1 hour and 30 minutes procedure was unsuccessful to enter the common hepatic duct suggestive of a very tight stricture/narrowing.  Due to prolonged attempt unsuccessful procedure, ultimately procedure was terminated.    Bilateral films under diaphragm did not show any free air    Impressions:   1.  Extremely tight common hepatic duct narrowing unable to pass any ERCP device through except 0.035 wire.  2.  Patent cystic duct and gallbladder  3.  Biliary sphincterotomy; ampulla adjacent to diverticulum    Recommendations:   1.  Start ciprofloxacin empirically for 5 days due to filling of gallbladder.  2.   Monitor for post ERCP pancreatitis, acute cholecystitis, fever, chills, pain  3. NPO for 4 hours and if no pain, advance diet as tolerated  4. Avoid anticoagulation for 3 days to decrease sphincterotomy bleed (lovenox discontinued); consider SCD.  5. Indomethacin 100mg suppository and 1L lactated ringer to decrease pancreatitis risk  6. Discussed options with Dr. Schaffer and family    NOTE: Radiologic interpretation of dynamic and static fluoroscopic imaging by myself.  At no time was/were a Radiologist present.     Code Modifier 22 added due to difficult procedure, longer than usual time spent.

## 2019-01-02 ENCOUNTER — APPOINTMENT (OUTPATIENT)
Dept: ADMISSIONS | Facility: MEDICAL CENTER | Age: 74
End: 2019-01-02
Attending: INTERNAL MEDICINE
Payer: MEDICARE

## 2019-01-07 ENCOUNTER — HOSPITAL ENCOUNTER (OUTPATIENT)
Facility: MEDICAL CENTER | Age: 74
End: 2019-01-07
Attending: INTERNAL MEDICINE | Admitting: INTERNAL MEDICINE
Payer: MEDICARE

## 2019-01-07 VITALS
RESPIRATION RATE: 20 BRPM | SYSTOLIC BLOOD PRESSURE: 121 MMHG | HEART RATE: 65 BPM | TEMPERATURE: 97.2 F | DIASTOLIC BLOOD PRESSURE: 68 MMHG | BODY MASS INDEX: 21.48 KG/M2 | OXYGEN SATURATION: 100 % | WEIGHT: 121.25 LBS | HEIGHT: 63 IN

## 2019-01-07 LAB
EKG IMPRESSION: NORMAL
PATHOLOGY CONSULT NOTE: NORMAL

## 2019-01-07 PROCEDURE — 160046 HCHG PACU - 1ST 60 MINS PHASE II: Performed by: INTERNAL MEDICINE

## 2019-01-07 PROCEDURE — 160035 HCHG PACU - 1ST 60 MINS PHASE I: Performed by: INTERNAL MEDICINE

## 2019-01-07 PROCEDURE — 160048 HCHG OR STATISTICAL LEVEL 1-5: Performed by: INTERNAL MEDICINE

## 2019-01-07 PROCEDURE — 88305 TISSUE EXAM BY PATHOLOGIST: CPT | Mod: 59

## 2019-01-07 PROCEDURE — 88312 SPECIAL STAINS GROUP 1: CPT

## 2019-01-07 PROCEDURE — 88342 IMHCHEM/IMCYTCHM 1ST ANTB: CPT

## 2019-01-07 PROCEDURE — A9270 NON-COVERED ITEM OR SERVICE: HCPCS

## 2019-01-07 PROCEDURE — 88112 CYTOPATH CELL ENHANCE TECH: CPT

## 2019-01-07 PROCEDURE — 88341 IMHCHEM/IMCYTCHM EA ADD ANTB: CPT

## 2019-01-07 PROCEDURE — 93010 ELECTROCARDIOGRAM REPORT: CPT | Performed by: INTERNAL MEDICINE

## 2019-01-07 PROCEDURE — 160208 HCHG ENDO MINUTES - EA ADDL 1 MIN LEVEL 4: Performed by: INTERNAL MEDICINE

## 2019-01-07 PROCEDURE — 93005 ELECTROCARDIOGRAM TRACING: CPT | Performed by: INTERNAL MEDICINE

## 2019-01-07 PROCEDURE — 160203 HCHG ENDO MINUTES - 1ST 30 MINS LEVEL 4: Performed by: INTERNAL MEDICINE

## 2019-01-07 PROCEDURE — 160002 HCHG RECOVERY MINUTES (STAT): Performed by: INTERNAL MEDICINE

## 2019-01-07 PROCEDURE — 700102 HCHG RX REV CODE 250 W/ 637 OVERRIDE(OP)

## 2019-01-07 PROCEDURE — 500066 HCHG BITE BLOCK, ECT: Performed by: INTERNAL MEDICINE

## 2019-01-07 PROCEDURE — 160025 RECOVERY II MINUTES (STATS): Performed by: INTERNAL MEDICINE

## 2019-01-07 PROCEDURE — 160009 HCHG ANES TIME/MIN: Performed by: INTERNAL MEDICINE

## 2019-01-07 PROCEDURE — 700111 HCHG RX REV CODE 636 W/ 250 OVERRIDE (IP)

## 2019-01-07 PROCEDURE — 700101 HCHG RX REV CODE 250

## 2019-01-07 RX ORDER — ALBUTEROL SULFATE 90 UG/1
AEROSOL, METERED RESPIRATORY (INHALATION)
Status: DISCONTINUED
Start: 2019-01-07 | End: 2019-01-07 | Stop reason: HOSPADM

## 2019-01-07 RX ORDER — ONDANSETRON 2 MG/ML
4 INJECTION INTRAMUSCULAR; INTRAVENOUS
Status: DISCONTINUED | OUTPATIENT
Start: 2019-01-07 | End: 2019-01-07 | Stop reason: HOSPADM

## 2019-01-07 RX ORDER — SODIUM CHLORIDE, SODIUM LACTATE, POTASSIUM CHLORIDE, CALCIUM CHLORIDE 600; 310; 30; 20 MG/100ML; MG/100ML; MG/100ML; MG/100ML
INJECTION, SOLUTION INTRAVENOUS CONTINUOUS
Status: DISCONTINUED | OUTPATIENT
Start: 2019-01-07 | End: 2019-01-07 | Stop reason: HOSPADM

## 2019-01-07 RX ORDER — METOPROLOL TARTRATE 1 MG/ML
1 INJECTION, SOLUTION INTRAVENOUS
Status: DISCONTINUED | OUTPATIENT
Start: 2019-01-07 | End: 2019-01-07 | Stop reason: HOSPADM

## 2019-01-07 RX ORDER — SODIUM CHLORIDE, SODIUM LACTATE, POTASSIUM CHLORIDE, CALCIUM CHLORIDE 600; 310; 30; 20 MG/100ML; MG/100ML; MG/100ML; MG/100ML
INJECTION, SOLUTION INTRAVENOUS ONCE
Status: DISCONTINUED | OUTPATIENT
Start: 2019-01-07 | End: 2019-01-07 | Stop reason: HOSPADM

## 2019-01-07 RX ORDER — HYDRALAZINE HYDROCHLORIDE 20 MG/ML
5 INJECTION INTRAMUSCULAR; INTRAVENOUS
Status: DISCONTINUED | OUTPATIENT
Start: 2019-01-07 | End: 2019-01-07 | Stop reason: HOSPADM

## 2019-01-07 RX ORDER — MEPERIDINE HYDROCHLORIDE 25 MG/ML
6.25 INJECTION INTRAMUSCULAR; INTRAVENOUS; SUBCUTANEOUS
Status: DISCONTINUED | OUTPATIENT
Start: 2019-01-07 | End: 2019-01-07 | Stop reason: HOSPADM

## 2019-01-07 RX ORDER — SODIUM CHLORIDE, SODIUM LACTATE, POTASSIUM CHLORIDE, CALCIUM CHLORIDE 600; 310; 30; 20 MG/100ML; MG/100ML; MG/100ML; MG/100ML
1000 INJECTION, SOLUTION INTRAVENOUS
Status: DISCONTINUED | OUTPATIENT
Start: 2019-01-07 | End: 2019-01-07 | Stop reason: CLARIF

## 2019-01-07 ASSESSMENT — PAIN SCALES - GENERAL
PAINLEVEL_OUTOF10: 0

## 2019-01-07 NOTE — PROCEDURES
Echoendoscope  Date of Procedure: 1/7/2019  Attending Physician: Cristofer Brandon MD    Indications: Biliary stricture  Instrument: Olympus Echoendoscope  Sedation:   Anesthesiologist/Type of Anesthesia:  Anesthesiologist: Braxton Kendall M.D./General    Surgical Staff:  Circulator: Yakov Pinto R.N.; Dedrick Nassar R.N.  Endoscopy Technician: Leann Pena; Pati Watts    Specimens removed if any:  ID Type Source Tests Collected by Time Destination   A : BIOPSIES Tissue Duodenum PATHOLOGY SPECIMEN Cristofer Brandon M.D. 1/7/2019 10:52 AM    B : NODULE Tissue Duodenum PATHOLOGY SPECIMEN Cristofer Brandon M.D. 1/7/2019 10:52 AM    C : BIOPSIES Tissue Gastric PATHOLOGY SPECIMEN Cristofer Brandon M.D. 1/7/2019 10:52 AM    D : SLIDES/REMNANTS--PORTAHEPATIS LYMPH NODE Other Other PATHOLOGY SPECIMEN Cristofer Brandon M.D. 1/7/2019 10:55 AM    E : CORE OF PORTAHEPATIS LYMPH NODE Other Other PATHOLOGY SPECIMEN Cristofer Brandon M.D. 1/7/2019 11:03 AM        Pre-Anesthesia Assessment:  Prior to the procedure, a History and Physical was performed, and patient medications and allergies were reviewed. The patient’s tolerance of previous anesthesia was also reviewed. The risks and benefits of the procedure and the sedation options and risks were discussed with the patient including but not limited to infection, bleeding, aspiration, perforation, adverse medication reaction, missed diagnosis,  pancreatitis, and missed lesions. The patient verbalized understanding. All questions were answered, and informed consent was obtained.     After I obtained informed consent from the patient, the patient was placed in the left lateral position. Appropriate time-out protocol was followed: the correct patient, the correct procedure, and the correct equipment in the room were confirmed. Throughout the procedure, the patient’s blood pressure, pulse, and oxygen saturations were monitored continuously. The endoscope and echoendoscope was/were inserted through the  oropharynx, esophagus intubated, then advanced to the gastrointestinal tract.  Findings and interventions were performed and documented below. Air was then withdrawn and the echoendoscope was removed. The patient tolerated the procedure well. There were no immediate postoperative complications.     Findings:    EGD:  Esophagus: normal GE junction at 38 cm  Stomach: no gross abnormalities, biopsies taken to rule out H. Pylori for cause of patient's abdominal pain  Duodenum: Slight nodularity at the duodenal sweep biopsied.  Examination to second portion otherwise normal biopsied to rule out celiac    EUS:  Endoscopic exam with the side viewing echoendoscope was normal. The major papilla was normal sonographically.    The bile duct was non-dilated in the distal common bile duct (2.6mm) and then dilated to 9.5mm in size proximally in maximal diameter; the transition point did not demonstrate any extrinsic mass. There does not appear to have any gross thickening of the ductal wall. The gallbladder was normal. There were no stones or sludge.    The pancreatic parenchyma appeared normal. There were no features of chronic pancreatitis. No masses, cysts or stones were visualized in the pancreatic parenchyma. The main pancreatic duct was followed from the major papilla to the body, excluding pancreas divisum. The pancreatic duct measured 2.8mm in the head, 1.2mm in the body of the pancreas.    Two lymph nodes (11.2mm x 5.5mm isoechoic and 18.6mm x 9.2mm isoechoic) were seen in the upper abdomen near the philipp hepatis area (seen in duodenal bulb). Utilizing Doppler ultrasound to confirm no intervening vessel fine-needle biopsy with a 22-gauge Acquire needle was performed with 2 passes on the 11.2mm x 5.5mm philipp hepatis lymph node.  The 18.6 mm lymph node had an intervening blood vessel/bile duct which prevented biopsy.  Examination for location for biopsy was performed in the duodenal bulb second portion duodenum.    No  masses were seen in the visualized portions of the liver.    The left adrenal appeared normal.  Impressions:   1. No gross mass at the transition of the bile duct stricture  2. Two philipp-hepatis lymph node, fine needle biopsy on the (11.2mm x 5.5mm)  3. Slight duodenal mucosal nodularity at D1/D2 transition, forceps biopsied  4. Normal appearing gastric and duodenal mucosa otherwise, biopsied.    Recommendations:   1. Monitor for post procedure complications  2. Await pathology  3. Follow-up in clinic or with surgery.

## 2019-01-07 NOTE — PROGRESS NOTES
Indication:Biliary stricture.   Risks, benefits, and alternatives were discussed with consenting person(s). Consenting person(s) were given an opportunity to ask questions and discuss other options. Risks including but not limited to failed or incomplete EUS, ineffective therapy, pancreatitis (with potential future complications), perforation, infection, bleeding, missed lesion(s), missed diagnosis, cardiac and/or pulmonary event, aspiration, stroke, possible need for surgery, hospitalization possibly prolonged, discomfort, unsuccessful and/or incomplete procedure, possible need for repeat procedures and/or additional testings, damage to adjacent organs and/or vascular structures, medication reaction, disability, death, and other adverse events possibly life-threatening. Discussion was undertaken with Layman's terms. Consenting persons stated understanding and acceptance of these risks, and wished to proceed. Consent was given in clear state of mind.

## 2019-01-07 NOTE — OR NURSING
1115 pt to pacu s/p general anes for EGD, EUS, FNA.  VSS. Lungs clear. 1130 pt is awake, denies pain at this time. 1145 meets st 2 criteria.

## 2019-01-07 NOTE — DISCHARGE INSTRUCTIONS
ENDOSCOPY HOME CARE INSTRUCTIONS    GASTROSCOPY OR ERCP  1. Don't eat or drink anything for about an hour after the test. You can then resume your regular diet.  2. Don't drive or drink alcohol for 24 hours. The medication you received will make you too drowsy.  3. Don't take any coffee, tea, or aspirin products until after you see your doctor. These can harm the lining of your stomach.  4. If you begin to vomit bloody material, or develop black or bloody stools, call your doctor as soon as possible.  5. If you have any neck, chest, abdominal pain or temp of 100 degrees, call your doctor.  6. See your doctor ***  7. Additional instructions: ***  8. Prescriptions: ***    You should call 911 if you develop problems with breathing or chest pain.  If any questions arise, call your doctor. If your doctor is not available, please feel free to call {Endoscopy Dept Numbers:[id number]}. You can also call the HEALTH HOTLINE open 24 hours/day, 7 days/week and speak to a nurse at [phone], or toll free [phone].    Depression / Suicide Risk    As you are discharged from this [facility] facility, it is important to learn how to keep safe from harming yourself.    Recognize the warning signs:  · Abrupt changes in personality, positive or negative- including increase in energy   · Giving away possessions  · Change in eating patterns- significant weight changes-  positive or negative  · Change in sleeping patterns- unable to sleep or sleeping all the time   · Unwillingness or inability to communicate  · Depression  · Unusual sadness, discouragement and loneliness  · Talk of wanting to die  · Neglect of personal appearance   · Rebelliousness- reckless behavior  · Withdrawal from people/activities they love  · Confusion- inability to concentrate     If you or a loved one observes any of these behaviors or has concerns about self-harm, here's what you can do:  · Talk about it- your feelings and reasons for harming  yourself  · Remove any means that you might use to hurt yourself (examples: pills, rope, extension cords, firearm)  · Get professional help from the community (Mental Health, Substance Abuse, psychological counseling)  · Do not be alone:Call your Safe Contact- someone whom you trust who will be there for you.  · Call your local CRISIS HOTLINE 362-7786 or 682-848-3422  · Call your local Children's Mobile Crisis Response Team [place] (944) 318-9800 or www.TimeCast  · Call the toll free National Suicide Prevention Hotlines   · National Suicide Prevention Lifeline 633-937-ZVTS (1269)  · National Hope Line Network 800-SUICIDE (852-4512)    I acknowledge receipt and understanding of these Home Care Instructions.

## 2019-01-15 ENCOUNTER — OFFICE VISIT (OUTPATIENT)
Dept: HEMATOLOGY ONCOLOGY | Facility: MEDICAL CENTER | Age: 74
End: 2019-01-15
Payer: MEDICARE

## 2019-01-15 VITALS
OXYGEN SATURATION: 93 % | BODY MASS INDEX: 21.64 KG/M2 | HEART RATE: 62 BPM | DIASTOLIC BLOOD PRESSURE: 70 MMHG | WEIGHT: 122.14 LBS | TEMPERATURE: 98.2 F | SYSTOLIC BLOOD PRESSURE: 142 MMHG | HEIGHT: 63 IN

## 2019-01-15 DIAGNOSIS — K83.1 BILIARY STRICTURE: ICD-10-CM

## 2019-01-15 DIAGNOSIS — R10.9 ABDOMINAL PAIN, UNSPECIFIED ABDOMINAL LOCATION: ICD-10-CM

## 2019-01-15 DIAGNOSIS — K83.1 OBSTRUCTIVE JAUNDICE: ICD-10-CM

## 2019-01-15 PROCEDURE — 99205 OFFICE O/P NEW HI 60 MIN: CPT | Performed by: SURGERY

## 2019-01-15 ASSESSMENT — ENCOUNTER SYMPTOMS
WEIGHT LOSS: 1
COUGH: 1

## 2019-01-15 NOTE — PROGRESS NOTES
Subjective:   1/15/2019  7:42 AM  Primary care physician:Damien Phillips M.D.  Referring Provider: Cristofer Brandon MD & Christopher Schaffer MD      Chief Complaint:   1. Biliary stricture     2. Obstructive jaundice     3. Abdominal pain, unspecified abdominal location         History of presenting illness:  Ms. Hopper  is a pleasant 73 y.o. year old female who presented with a history of recurrent biliary strictures.  The patient states that approximately month ago she developed some epigastric discomfort and right upper quadrant pain.  She was found to have a bilirubin of 3.1 and dark urine.  She underwent a CT scan on December 14, 2018 because she had to be transferred to the main hospital because of no GI up in [place].  The workup included a CT scan which was Neville protocol which did show a high resolution stricture in the common bile duct just below the common hepatic duct at the junction of the cystic duct.  The patient has an unusual history of having been operated on 65 years ago for a biliary stricture as a child a 8 years old.  She has been fine since then and I am unclear whether she had a resection or just an exploration.  She does describe a T-tube placement and this would be a classic site for stricture especially back in the 1950s.  The patient's gallbladder appears normal.  Her pancreatic head appears normal on endoscopic ultrasound.  My personal review of the pancreas protocol CT scan shows a stricture right at the cystic duct margin which would be a location that someone might explore on a common duct exploration.  In any event, she has had several bouts in 1 week ago she had a similar bout that caused severe pain and she ended up in the emergency room again.  She underwent an endoscopic ultrasound by Dr. Brandon which did not show any  mass in the head of the pancreas.  The CT scan does not show anything in the hilum.  In the gallbladder appears normal.  She is here with her son-in-law to discuss the  next steps in her care.      Past Medical History:   Diagnosis Date   • Bowel habit changes 01/2019    constipation   • Cataract     small not operable yet   • Hypertension    • Hypothyroid    • Jaundice     had bile blockage from infant until 9yo   • Stroke (HCC) 07/2018    suspected small stroke per neurologist, has plaque in carotid- no residual- states on statin for this,1/2019 not taking statin r/t muscle spasms     Past Surgical History:   Procedure Laterality Date   • GASTROSCOPY N/A 1/7/2019    Procedure: GASTROSCOPY;  Surgeon: Cristofer Brandon M.D.;  Location: SURGERY [facility];  Service: Gastroenterology   • EGD WITH ASP/BX N/A 1/7/2019    Procedure: EGD WITH ASP/BX;  Surgeon: Cristofer Brandon M.D.;  Location: [facility];  Service: Gastroenterology   • ERCP  12/13/2018    Procedure: ERCP;  Surgeon: Cristofer Brandon M.D.;  Location: [facility];  Service: Gastroenterology   • LAPAROTOMY  1963    for clogged bile duct   • APPENDECTOMY  1952    ruptured     No Known Allergies  Outpatient Encounter Prescriptions as of 1/15/2019   Medication Sig Dispense Refill   • aspirin EC (ECOTRIN) 81 MG Tablet Delayed Response Take 1 Tab by mouth every day. 30 Tab    • atorvastatin (LIPITOR) 40 MG Tab Take 40 mg by mouth every evening.     • Cholecalciferol (VITAMIN D3) 2000 UNIT Cap Take 2,000 Units by mouth every day.     • Cyanocobalamin (VITAMIN B-12) 1000 MCG Tab Take 2,000 mcg by mouth every day.     • levothyroxine (SYNTHROID) 100 MCG Tab Take 100 mcg by mouth every day.     • lisinopril (PRINIVIL) 5 MG Tab Take 5 mg by mouth every day.       No facility-administered encounter medications on file as of 1/15/2019.      Social History     Social History   • Marital status:      Spouse name: N/A   • Number of children: N/A   • Years of education: N/A     Occupational History   • Not on file.     Social History Main Topics   • Smoking status: Former Smoker     Quit date: 2000   • Smokeless  "tobacco: Never Used   • Alcohol use Yes      Comment: 1-2 glasses of wine a night- 1/2/19 none in a month   • Drug use: No   • Sexual activity: Not on file     Other Topics Concern   • Not on file     Social History Narrative   • No narrative on file      History   Smoking Status   • Former Smoker   • Quit date: 2000   Smokeless Tobacco   • Never Used     History   Alcohol Use   • Yes     Comment: 1-2 glasses of wine a night- 1/2/19 none in a month     History   Drug Use No      OB History   No data available      No LMP recorded (lmp unknown). Patient is postmenopausal.    Family History   Problem Relation Age of Onset   • Cancer Mother         Head and neck cancer       Review of Systems   Constitutional: Positive for weight loss (11lbs).   HENT: Positive for hearing loss.    Respiratory: Positive for cough.    Endo/Heme/Allergies: Positive for environmental allergies.   All other systems reviewed and are negative.       Objective:   /70 (BP Location: Left arm, Patient Position: Sitting)   Pulse 62   Temp 36.8 °C (98.2 °F)   Ht 1.6 m (5' 3\")   Wt 55.4 kg (122 lb 2.2 oz)   LMP  (LMP Unknown)   SpO2 93%   BMI 21.64 kg/m²       Physical Exam   Constitutional: She is oriented to person, place, and time. She appears well-developed and well-nourished.   HENT:   Head: Normocephalic and atraumatic.   Eyes: Pupils are equal, round, and reactive to light. Conjunctivae are normal.   Neck: Normal range of motion. Neck supple.   Cardiovascular: Normal rate and regular rhythm.    Pulmonary/Chest: Effort normal and breath sounds normal.   Abdominal: Soft. Bowel sounds are normal.   Well-healed subcostal scar from 65 years ago   Musculoskeletal: Normal range of motion.   Neurological: She is alert and oriented to person, place, and time.   Skin: Skin is warm and dry.   Psychiatric: She has a normal mood and affect. Her behavior is normal. Judgment and thought content normal.   Nursing note and vitals " reviewed.      Labs:  Results for ANIYHA PHIPPS (MRN [id number]) as of 1/15/2019 07:46   Ref. Range 12/13/2018 05:14   WBC Latest Ref Range: 4.8 - 10.8 K/uL 4.5 (L)   RBC Latest Ref Range: 4.20 - 5.40 M/uL 3.65 (L)   Hemoglobin Latest Ref Range: 12.0 - 16.0 g/dL 11.5 (L)   Hematocrit Latest Ref Range: 37.0 - 47.0 % 33.7 (L)   MCV Latest Ref Range: 81.4 - 97.8 fL 92.3   MCH Latest Ref Range: 27.0 - 33.0 pg 31.5   MCHC Latest Ref Range: 33.6 - 35.0 g/dL 34.1   RDW Latest Ref Range: 35.9 - 50.0 fL 41.5   Platelet Count Latest Ref Range: 164 - 446 K/uL 183   MPV Latest Ref Range: 9.0 - 12.9 fL 10.4   Neutrophils-Polys Latest Ref Range: 44.00 - 72.00 % 68.20   Neutrophils (Absolute) Latest Ref Range: 2.00 - 7.15 K/uL 3.05   Lymphocytes Latest Ref Range: 22.00 - 41.00 % 19.20 (L)   Lymphs (Absolute) Latest Ref Range: 1.00 - 4.80 K/uL 0.86 (L)   Monocytes Latest Ref Range: 0.00 - 13.40 % 9.80   Monos (Absolute) Latest Ref Range: 0.00 - 0.85 K/uL 0.44   Eosinophils Latest Ref Range: 0.00 - 6.90 % 2.20   Eos (Absolute) Latest Ref Range: 0.00 - 0.51 K/uL 0.10   Basophils Latest Ref Range: 0.00 - 1.80 % 0.40   Baso (Absolute) Latest Ref Range: 0.00 - 0.12 K/uL 0.02   Immature Granulocytes Latest Ref Range: 0.00 - 0.90 % 0.20   Immature Granulocytes (abs) Latest Ref Range: 0.00 - 0.11 K/uL 0.01   Nucleated RBC Latest Units: /100 WBC 0.00   NRBC (Absolute) Latest Units: K/uL 0.00   Sodium Latest Ref Range: 135 - 145 mmol/L 137   Potassium Latest Ref Range: 3.6 - 5.5 mmol/L 3.7   Chloride Latest Ref Range: 96 - 112 mmol/L 109   Co2 Latest Ref Range: 20 - 33 mmol/L 22   Anion Gap Latest Ref Range: 0.0 - 11.9  6.0   Glucose Latest Ref Range: 65 - 99 mg/dL 84   Bun Latest Ref Range: 8 - 22 mg/dL 6 (L)   Creatinine Latest Ref Range: 0.50 - 1.40 mg/dL 0.71   GFR If  Latest Ref Range: >60 mL/min/1.73 m 2 >60   GFR If Non  Latest Ref Range: >60 mL/min/1.73 m 2 >60   Calcium Latest Ref Range: 8.4 -  10.2 mg/dL 8.2 (L)   AST(SGOT) Latest Ref Range: 12 - 45 U/L 127 (H)   ALT(SGPT) Latest Ref Range: 2 - 50 U/L 344 (H)   Alkaline Phosphatase Latest Ref Range: 30 - 99 U/L 212 (H)   Total Bilirubin Latest Ref Range: 0.1 - 1.5 mg/dL 1.4   Albumin Latest Ref Range: 3.2 - 4.9 g/dL 3.2   Total Protein Latest Ref Range: 6.0 - 8.2 g/dL 5.5 (L)   Globulin Latest Ref Range: 1.9 - 3.5 g/dL 2.3   A-G Ratio Latest Units: g/dL 1.4   Lipase Latest Ref Range: 7 - 58 U/L 19       Imaging:  Per my review  Impression CT 12/14/18        1. Right-sided retroperitoneal air surrounding the right kidney could relate to recent procedure. Correlate clinically.    2. Stricture in the mid CBD with persistent dilated proximal CBD/common hepatic duct. No obvious compressing mass is seen.    3. There is gas in the cystic duct and contrast in the gallbladder, likely related to recent procedure.    4. No mass is seen in the head of the pancreas. No pancreatic duct dilatation.         Pathology:  FINAL DIAGNOSIS: 1/7/19    A. Duodenum biopsy:         Small bowel mucosa demonstrating the usual villous architecture          and no increase in intra-epithelial lymphocytes are noted.  No          histologic features suggestive of sprue identified. No          malignancy identified.  B. Duodenum nodule:         Small bowel mucosa demonstrating the usual villous architecture          and no increase in intra-epithelial lymphocytes are noted.  No          histologic features suggestive of sprue identified. No          malignancy identified.  C. Gastric biopsies:         Benign gastric mucosal biopsies demonstrating mild chronic          inflammation with rare neutrophil noted and no H. pylori          organisms identified on Warthin-Starry special stain.  D. Tatiana hepatis lymph node:         Fine needle aspiration smears, including cell block,          demonstrating predominately red blood cells and small mature          lymphocytes with some GI  contaminants. See comment.         No metastatic malignancy identified.  E. Core of philipp hepatis lymph node:         Core biopsies demonstrating small mature lymphocytes with no          evidence of metastatic malignancy identified.  See comment.    Procedures: 1/7/19    EGD/EGD     Assessment:     1. Biliary stricture    2. Obstructive jaundice    3. Abdominal pain, unspecified abdominal location        Medical Decision Making:  Today's Assessment / Status / Plan:     In light of the present findings, my personal review of the imaging which is only the CT scan shows a benign stricture.  This is extremely unusual because a malignant tumor or malignant stricture would not show such a fine resolution where the obstruction is.  The patient has a interesting past medical history where she was operated on her bile duct at age 8 due to some type of obstruction or stricture.  The location of the stricture is in the common hepatic duct/common bile duct junction just at the level of the cystic duct.  90% of the time any stricture in this area is pathopneumonic for a gallbladder carcinoma extending down the cystic duct but her gallbladder appears completely normal and the duct is not thickened at all.  The intra-pancreatic duct appears normal and according to the endoscopic ultrasound there is no sign of tumor in the head of the pancreas either.  The hepatic duct is dilated on the imaging, as well as the intrahepatic ducts.  My suspicion is that this is a benign stricture related to her surgery 65 years ago.  Because of this, I am recommending she undergo a spyglass with biopsies and balloon dilation.  I suspect this will manage her stricture.  It will also help us confirm whether there is any atypia or osorio malignancy both visually and pathologically.  I have put a call into Dr. Brandon to set up the spyglass and she will follow-up with me after it has been completed.      She agreed and verbalized her agreement and  understanding with the current plan. I answered all questions and concerns she has at this time and advised her to call at any time in there interim with questions or concerns in regards to her care.    Thank you for allowing me to participate in her care, I will continue to follow closely.       Please note that this dictation was created using voice recognition software. I have made every reasonable attempt to correct obvious errors, but I expect that there are errors of grammar and possibly content that I did not discover before finalizing the note.     Thank you for this consultation and allowing me to participate in your patient's care. If I can be of further service please contact my office.

## 2019-01-16 NOTE — PATIENT INSTRUCTIONS
The patient will be sent back to Dr. Brandon for a spyglass, biopsy of the inside of the bile duct stricture as well as balloon dilation.  She will see me after that has been completed.

## 2019-01-22 ENCOUNTER — APPOINTMENT (OUTPATIENT)
Dept: ADMISSIONS | Facility: MEDICAL CENTER | Age: 74
DRG: 445 | End: 2019-01-22
Attending: INTERNAL MEDICINE
Payer: MEDICARE

## 2019-01-22 NOTE — OR NURSING
Hx and meds reviewed, pre op instructions given. Pt aware may take the listed meds morning of surgery; synthroid. Anesthesia fasting guidelines reviewed with pt.

## 2019-01-28 ENCOUNTER — APPOINTMENT (OUTPATIENT)
Dept: RADIOLOGY | Facility: MEDICAL CENTER | Age: 74
DRG: 445 | End: 2019-01-28
Attending: INTERNAL MEDICINE
Payer: MEDICARE

## 2019-01-28 ENCOUNTER — HOSPITAL ENCOUNTER (OUTPATIENT)
Facility: MEDICAL CENTER | Age: 74
DRG: 445 | End: 2019-01-28
Attending: INTERNAL MEDICINE | Admitting: INTERNAL MEDICINE
Payer: MEDICARE

## 2019-01-28 VITALS
DIASTOLIC BLOOD PRESSURE: 63 MMHG | OXYGEN SATURATION: 96 % | TEMPERATURE: 97.3 F | BODY MASS INDEX: 21.13 KG/M2 | RESPIRATION RATE: 18 BRPM | SYSTOLIC BLOOD PRESSURE: 150 MMHG | WEIGHT: 119.27 LBS | HEIGHT: 63 IN | HEART RATE: 52 BPM

## 2019-01-28 DIAGNOSIS — K83.1 BILIARY STRICTURE: ICD-10-CM

## 2019-01-28 LAB
ANION GAP SERPL CALC-SCNC: 7 MMOL/L (ref 0–11.9)
BUN SERPL-MCNC: 15 MG/DL (ref 8–22)
CALCIUM SERPL-MCNC: 8.8 MG/DL (ref 8.4–10.2)
CHLORIDE SERPL-SCNC: 108 MMOL/L (ref 96–112)
CO2 SERPL-SCNC: 22 MMOL/L (ref 20–33)
CREAT SERPL-MCNC: 0.81 MG/DL (ref 0.5–1.4)
ERYTHROCYTE [DISTWIDTH] IN BLOOD BY AUTOMATED COUNT: 41.1 FL (ref 35.9–50)
GLUCOSE SERPL-MCNC: 86 MG/DL (ref 65–99)
HCT VFR BLD AUTO: 36.3 % (ref 37–47)
HGB BLD-MCNC: 12.1 G/DL (ref 12–16)
MCH RBC QN AUTO: 30.7 PG (ref 27–33)
MCHC RBC AUTO-ENTMCNC: 33.3 G/DL (ref 33.6–35)
MCV RBC AUTO: 92.1 FL (ref 81.4–97.8)
PATHOLOGY CONSULT NOTE: NORMAL
PLATELET # BLD AUTO: 196 K/UL (ref 164–446)
PMV BLD AUTO: 10.2 FL (ref 9–12.9)
POTASSIUM SERPL-SCNC: 4.4 MMOL/L (ref 3.6–5.5)
RBC # BLD AUTO: 3.94 M/UL (ref 4.2–5.4)
SODIUM SERPL-SCNC: 137 MMOL/L (ref 135–145)
WBC # BLD AUTO: 4.4 K/UL (ref 4.8–10.8)

## 2019-01-28 PROCEDURE — 700101 HCHG RX REV CODE 250: Performed by: INTERNAL MEDICINE

## 2019-01-28 PROCEDURE — C1876 STENT, NON-COA/NON-COV W/DEL: HCPCS | Performed by: INTERNAL MEDICINE

## 2019-01-28 PROCEDURE — 85027 COMPLETE CBC AUTOMATED: CPT

## 2019-01-28 PROCEDURE — 700111 HCHG RX REV CODE 636 W/ 250 OVERRIDE (IP)

## 2019-01-28 PROCEDURE — 160025 RECOVERY II MINUTES (STATS): Performed by: INTERNAL MEDICINE

## 2019-01-28 PROCEDURE — C1769 GUIDE WIRE: HCPCS | Performed by: INTERNAL MEDICINE

## 2019-01-28 PROCEDURE — 700111 HCHG RX REV CODE 636 W/ 250 OVERRIDE (IP): Performed by: ANESTHESIOLOGY

## 2019-01-28 PROCEDURE — 700117 HCHG RX CONTRAST REV CODE 255

## 2019-01-28 PROCEDURE — 500066 HCHG BITE BLOCK, ECT: Performed by: INTERNAL MEDICINE

## 2019-01-28 PROCEDURE — 80048 BASIC METABOLIC PNL TOTAL CA: CPT

## 2019-01-28 PROCEDURE — 110371 HCHG SHELL REV 272: Performed by: INTERNAL MEDICINE

## 2019-01-28 PROCEDURE — 160009 HCHG ANES TIME/MIN: Performed by: INTERNAL MEDICINE

## 2019-01-28 PROCEDURE — 700102 HCHG RX REV CODE 250 W/ 637 OVERRIDE(OP)

## 2019-01-28 PROCEDURE — 700101 HCHG RX REV CODE 250

## 2019-01-28 PROCEDURE — 36415 COLL VENOUS BLD VENIPUNCTURE: CPT

## 2019-01-28 PROCEDURE — A9270 NON-COVERED ITEM OR SERVICE: HCPCS | Performed by: ANESTHESIOLOGY

## 2019-01-28 PROCEDURE — 160002 HCHG RECOVERY MINUTES (STAT): Performed by: INTERNAL MEDICINE

## 2019-01-28 PROCEDURE — 502240 HCHG MISC OR SUPPLY RC 0272: Performed by: INTERNAL MEDICINE

## 2019-01-28 PROCEDURE — 160036 HCHG PACU - EA ADDL 30 MINS PHASE I: Performed by: INTERNAL MEDICINE

## 2019-01-28 PROCEDURE — 88305 TISSUE EXAM BY PATHOLOGIST: CPT

## 2019-01-28 PROCEDURE — 160046 HCHG PACU - 1ST 60 MINS PHASE II: Performed by: INTERNAL MEDICINE

## 2019-01-28 PROCEDURE — 160048 HCHG OR STATISTICAL LEVEL 1-5: Performed by: INTERNAL MEDICINE

## 2019-01-28 PROCEDURE — 160203 HCHG ENDO MINUTES - 1ST 30 MINS LEVEL 4: Performed by: INTERNAL MEDICINE

## 2019-01-28 PROCEDURE — A9270 NON-COVERED ITEM OR SERVICE: HCPCS

## 2019-01-28 PROCEDURE — 160035 HCHG PACU - 1ST 60 MINS PHASE I: Performed by: INTERNAL MEDICINE

## 2019-01-28 PROCEDURE — 700102 HCHG RX REV CODE 250 W/ 637 OVERRIDE(OP): Performed by: ANESTHESIOLOGY

## 2019-01-28 PROCEDURE — 160208 HCHG ENDO MINUTES - EA ADDL 1 MIN LEVEL 4: Performed by: INTERNAL MEDICINE

## 2019-01-28 PROCEDURE — 74330 X-RAY BILE/PANC ENDOSCOPY: CPT

## 2019-01-28 PROCEDURE — 503224 HCHG FORCEP, BIOPSY, SPYBITE: Performed by: INTERNAL MEDICINE

## 2019-01-28 DEVICE — STENT WALLFLEX BILIARY RX FC RMV US 10X80: Type: IMPLANTABLE DEVICE | Status: FUNCTIONAL

## 2019-01-28 RX ORDER — TRAMADOL HYDROCHLORIDE 50 MG/1
50 TABLET ORAL EVERY 4 HOURS PRN
Qty: 30 TAB | Refills: 0 | Status: ON HOLD | OUTPATIENT
Start: 2019-01-28 | End: 2019-02-09

## 2019-01-28 RX ORDER — ONDANSETRON 2 MG/ML
4 INJECTION INTRAMUSCULAR; INTRAVENOUS
Status: DISCONTINUED | OUTPATIENT
Start: 2019-01-28 | End: 2019-01-28 | Stop reason: HOSPADM

## 2019-01-28 RX ORDER — SODIUM CHLORIDE, SODIUM LACTATE, POTASSIUM CHLORIDE, CALCIUM CHLORIDE 600; 310; 30; 20 MG/100ML; MG/100ML; MG/100ML; MG/100ML
INJECTION, SOLUTION INTRAVENOUS ONCE
Status: COMPLETED | OUTPATIENT
Start: 2019-01-28 | End: 2019-01-28

## 2019-01-28 RX ORDER — HYDROMORPHONE HYDROCHLORIDE 1 MG/ML
0.1 INJECTION, SOLUTION INTRAMUSCULAR; INTRAVENOUS; SUBCUTANEOUS
Status: DISCONTINUED | OUTPATIENT
Start: 2019-01-28 | End: 2019-01-28 | Stop reason: HOSPADM

## 2019-01-28 RX ORDER — METOPROLOL TARTRATE 1 MG/ML
1 INJECTION, SOLUTION INTRAVENOUS
Status: DISCONTINUED | OUTPATIENT
Start: 2019-01-28 | End: 2019-01-28 | Stop reason: HOSPADM

## 2019-01-28 RX ORDER — SODIUM CHLORIDE, SODIUM LACTATE, POTASSIUM CHLORIDE, CALCIUM CHLORIDE 600; 310; 30; 20 MG/100ML; MG/100ML; MG/100ML; MG/100ML
INJECTION, SOLUTION INTRAVENOUS CONTINUOUS
Status: DISCONTINUED | OUTPATIENT
Start: 2019-01-28 | End: 2019-01-28 | Stop reason: HOSPADM

## 2019-01-28 RX ORDER — CHLORHEXIDINE GLUCONATE ORAL RINSE 1.2 MG/ML
SOLUTION DENTAL
Status: DISCONTINUED
Start: 2019-01-28 | End: 2019-01-28 | Stop reason: HOSPADM

## 2019-01-28 RX ORDER — OXYCODONE HCL 5 MG/5 ML
5 SOLUTION, ORAL ORAL
Status: COMPLETED | OUTPATIENT
Start: 2019-01-28 | End: 2019-01-28

## 2019-01-28 RX ORDER — HYDROMORPHONE HYDROCHLORIDE 1 MG/ML
0.2 INJECTION, SOLUTION INTRAMUSCULAR; INTRAVENOUS; SUBCUTANEOUS
Status: DISCONTINUED | OUTPATIENT
Start: 2019-01-28 | End: 2019-01-28 | Stop reason: HOSPADM

## 2019-01-28 RX ORDER — AMOXICILLIN AND CLAVULANATE POTASSIUM 875; 125 MG/1; MG/1
1 TABLET, FILM COATED ORAL EVERY 12 HOURS
Qty: 10 TAB | Refills: 0 | Status: ON HOLD | OUTPATIENT
Start: 2019-01-28 | End: 2019-02-09

## 2019-01-28 RX ORDER — MEPERIDINE HYDROCHLORIDE 25 MG/ML
12.5 INJECTION INTRAMUSCULAR; INTRAVENOUS; SUBCUTANEOUS
Status: DISCONTINUED | OUTPATIENT
Start: 2019-01-28 | End: 2019-01-28 | Stop reason: HOSPADM

## 2019-01-28 RX ORDER — HYDROMORPHONE HYDROCHLORIDE 1 MG/ML
0.4 INJECTION, SOLUTION INTRAMUSCULAR; INTRAVENOUS; SUBCUTANEOUS
Status: DISCONTINUED | OUTPATIENT
Start: 2019-01-28 | End: 2019-01-28 | Stop reason: HOSPADM

## 2019-01-28 RX ORDER — HYDRALAZINE HYDROCHLORIDE 20 MG/ML
5 INJECTION INTRAMUSCULAR; INTRAVENOUS
Status: DISCONTINUED | OUTPATIENT
Start: 2019-01-28 | End: 2019-01-28 | Stop reason: HOSPADM

## 2019-01-28 RX ORDER — DIPHENHYDRAMINE HYDROCHLORIDE 50 MG/ML
12.5 INJECTION INTRAMUSCULAR; INTRAVENOUS
Status: DISCONTINUED | OUTPATIENT
Start: 2019-01-28 | End: 2019-01-28 | Stop reason: HOSPADM

## 2019-01-28 RX ORDER — PIPERACILLIN SODIUM, TAZOBACTAM SODIUM 3; .375 G/15ML; G/15ML
INJECTION, POWDER, LYOPHILIZED, FOR SOLUTION INTRAVENOUS
Status: DISCONTINUED
Start: 2019-01-28 | End: 2019-01-28 | Stop reason: HOSPADM

## 2019-01-28 RX ORDER — OXYCODONE HCL 5 MG/5 ML
10 SOLUTION, ORAL ORAL
Status: COMPLETED | OUTPATIENT
Start: 2019-01-28 | End: 2019-01-28

## 2019-01-28 RX ORDER — HALOPERIDOL 5 MG/ML
1 INJECTION INTRAMUSCULAR
Status: DISCONTINUED | OUTPATIENT
Start: 2019-01-28 | End: 2019-01-28 | Stop reason: HOSPADM

## 2019-01-28 RX ADMIN — FENTANYL CITRATE 50 MCG: 50 INJECTION, SOLUTION INTRAMUSCULAR; INTRAVENOUS at 10:10

## 2019-01-28 RX ADMIN — LIDOCAINE HYDROCHLORIDE 0.5 ML: 10 INJECTION, SOLUTION INFILTRATION; PERINEURAL at 07:19

## 2019-01-28 RX ADMIN — SODIUM CHLORIDE, SODIUM LACTATE, POTASSIUM CHLORIDE, CALCIUM CHLORIDE: 600; 310; 30; 20 INJECTION, SOLUTION INTRAVENOUS at 07:20

## 2019-01-28 RX ADMIN — FENTANYL CITRATE 50 MCG: 50 INJECTION, SOLUTION INTRAMUSCULAR; INTRAVENOUS at 09:20

## 2019-01-28 RX ADMIN — FENTANYL CITRATE 50 MCG: 50 INJECTION, SOLUTION INTRAMUSCULAR; INTRAVENOUS at 09:28

## 2019-01-28 RX ADMIN — OXYCODONE HYDROCHLORIDE 10 MG: 5 SOLUTION ORAL at 10:29

## 2019-01-28 RX ADMIN — FENTANYL CITRATE 25 MCG: 50 INJECTION, SOLUTION INTRAMUSCULAR; INTRAVENOUS at 10:41

## 2019-01-28 NOTE — PROGRESS NOTES
Patient seen and examined before ERCP with Spyglass and fully-covered metallic biliary stent placement under x-ray fluoroscopy.  Risks, benefits, and alternatives were discussed with patient and daughter.  Consenting persons were given an opportunity to ask questions and discuss other options.  Risks including but not limited to pancreatitis, contrast reaction, radiation exposure, stent migration and/or occlusion, perforation, infection, bleeding, missed lesion(s), cardiac and/or pulmonary event, aspiration, hypoxia, stroke, possible need for surgery and/or interventional radiology, hospitalization possibly prolonged, discomfort, unsuccessful and/or incomplete procedure, indefinite diagnosis, ineffective therapy and/or persistent symptoms, possible need for repeat procedures and/or additional testings, damage to adjacent organs and/or vascular structures, medication reaction, disability, death, and other adverse events possibly life-threatening.  Discussion was undertaken with Layman's terms.  Consenting persons stated understanding and acceptance of these risks, and wished to proceed.  Informed consent was given in clear state of mind.

## 2019-01-28 NOTE — OR NURSING
0955: Rcvd report from CHARITO Calderon and assumed care. Pt resting in the gurney, sleepy, but arouses to voice and spontaneously.   1005: Dr Moore at bedside, POC discussed and questions answered.  1010: Pain increased, pain medication given per MAR, no nausea.

## 2019-01-28 NOTE — OR NURSING
0906: To PACU post ERCP. Pt sleeping, breathing is spontaneous and unlabored. Abdomen is soft, non distended.  0915: Pt c/o pain, moaning. See MAR.

## 2019-01-28 NOTE — OR NURSING
1120:  Patient c/o of gas pain.  Patient passing gas and burping.  Discussed with patient and family that this is to be expected.  Patient and family expressed understanding.      1145:  Discharge instructions reviewed with patient and family.  Questions addressed and family expresses understanding of discharge instructions.    1150: D/Tiago to care of family.

## 2019-01-31 ENCOUNTER — HOSPITAL ENCOUNTER (INPATIENT)
Facility: MEDICAL CENTER | Age: 74
LOS: 2 days | DRG: 445 | End: 2019-02-02
Attending: HOSPITALIST | Admitting: HOSPITALIST
Payer: MEDICARE

## 2019-01-31 ENCOUNTER — HOSPITAL ENCOUNTER (OUTPATIENT)
Facility: MEDICAL CENTER | Age: 74
End: 2019-01-31
Payer: MEDICARE

## 2019-01-31 DIAGNOSIS — R10.10 PAIN OF UPPER ABDOMEN: ICD-10-CM

## 2019-01-31 PROBLEM — E87.1 HYPONATREMIA: Status: ACTIVE | Noted: 2019-01-31

## 2019-01-31 PROBLEM — K83.09 CHOLANGITIS: Status: ACTIVE | Noted: 2019-01-31

## 2019-01-31 PROBLEM — R74.01 TRANSAMINITIS: Status: ACTIVE | Noted: 2019-01-31

## 2019-01-31 PROBLEM — K83.1 BILIARY OBSTRUCTION: Status: ACTIVE | Noted: 2019-01-31

## 2019-01-31 PROBLEM — K81.0 ACUTE CHOLECYSTITIS: Status: ACTIVE | Noted: 2019-01-31

## 2019-01-31 LAB
ALBUMIN SERPL BCP-MCNC: 3.5 G/DL (ref 3.2–4.9)
ALBUMIN/GLOB SERPL: 1.4 G/DL
ALP SERPL-CCNC: 225 U/L (ref 30–99)
ALT SERPL-CCNC: 322 U/L (ref 2–50)
ANION GAP SERPL CALC-SCNC: 8 MMOL/L (ref 0–11.9)
AST SERPL-CCNC: 134 U/L (ref 12–45)
BASOPHILS # BLD AUTO: 0.2 % (ref 0–1.8)
BASOPHILS # BLD: 0.03 K/UL (ref 0–0.12)
BILIRUB SERPL-MCNC: 2.8 MG/DL (ref 0.1–1.5)
BUN SERPL-MCNC: 8 MG/DL (ref 8–22)
CALCIUM SERPL-MCNC: 8.3 MG/DL (ref 8.4–10.2)
CHLORIDE SERPL-SCNC: 97 MMOL/L (ref 96–112)
CO2 SERPL-SCNC: 22 MMOL/L (ref 20–33)
CREAT SERPL-MCNC: 0.69 MG/DL (ref 0.5–1.4)
EOSINOPHIL # BLD AUTO: 0 K/UL (ref 0–0.51)
EOSINOPHIL NFR BLD: 0 % (ref 0–6.9)
ERYTHROCYTE [DISTWIDTH] IN BLOOD BY AUTOMATED COUNT: 39.6 FL (ref 35.9–50)
GLOBULIN SER CALC-MCNC: 2.5 G/DL (ref 1.9–3.5)
GLUCOSE SERPL-MCNC: 107 MG/DL (ref 65–99)
HCT VFR BLD AUTO: 40.2 % (ref 37–47)
HGB BLD-MCNC: 13.8 G/DL (ref 12–16)
IMM GRANULOCYTES # BLD AUTO: 0.07 K/UL (ref 0–0.11)
IMM GRANULOCYTES NFR BLD AUTO: 0.4 % (ref 0–0.9)
LYMPHOCYTES # BLD AUTO: 0.26 K/UL (ref 1–4.8)
LYMPHOCYTES NFR BLD: 1.6 % (ref 22–41)
MAGNESIUM SERPL-MCNC: 1.6 MG/DL (ref 1.5–2.5)
MCH RBC QN AUTO: 30.5 PG (ref 27–33)
MCHC RBC AUTO-ENTMCNC: 34.3 G/DL (ref 33.6–35)
MCV RBC AUTO: 88.7 FL (ref 81.4–97.8)
MONOCYTES # BLD AUTO: 1.02 K/UL (ref 0–0.85)
MONOCYTES NFR BLD AUTO: 6.4 % (ref 0–13.4)
NEUTROPHILS # BLD AUTO: 14.57 K/UL (ref 2–7.15)
NEUTROPHILS NFR BLD: 91.4 % (ref 44–72)
NRBC # BLD AUTO: 0 K/UL
NRBC BLD-RTO: 0 /100 WBC
PLATELET # BLD AUTO: 182 K/UL (ref 164–446)
PMV BLD AUTO: 10.2 FL (ref 9–12.9)
POTASSIUM SERPL-SCNC: 3.7 MMOL/L (ref 3.6–5.5)
PROT SERPL-MCNC: 6 G/DL (ref 6–8.2)
RBC # BLD AUTO: 4.53 M/UL (ref 4.2–5.4)
SODIUM SERPL-SCNC: 127 MMOL/L (ref 135–145)
WBC # BLD AUTO: 16 K/UL (ref 4.8–10.8)

## 2019-01-31 PROCEDURE — 700102 HCHG RX REV CODE 250 W/ 637 OVERRIDE(OP): Performed by: INTERNAL MEDICINE

## 2019-01-31 PROCEDURE — A9270 NON-COVERED ITEM OR SERVICE: HCPCS | Performed by: HOSPITALIST

## 2019-01-31 PROCEDURE — 36415 COLL VENOUS BLD VENIPUNCTURE: CPT

## 2019-01-31 PROCEDURE — 700102 HCHG RX REV CODE 250 W/ 637 OVERRIDE(OP): Performed by: HOSPITALIST

## 2019-01-31 PROCEDURE — 700105 HCHG RX REV CODE 258: Performed by: HOSPITALIST

## 2019-01-31 PROCEDURE — 99222 1ST HOSP IP/OBS MODERATE 55: CPT | Mod: AI | Performed by: HOSPITALIST

## 2019-01-31 PROCEDURE — A9270 NON-COVERED ITEM OR SERVICE: HCPCS | Performed by: INTERNAL MEDICINE

## 2019-01-31 PROCEDURE — 85025 COMPLETE CBC W/AUTO DIFF WBC: CPT

## 2019-01-31 PROCEDURE — 83735 ASSAY OF MAGNESIUM: CPT

## 2019-01-31 PROCEDURE — 770006 HCHG ROOM/CARE - MED/SURG/GYN SEMI*

## 2019-01-31 PROCEDURE — 80053 COMPREHEN METABOLIC PANEL: CPT

## 2019-01-31 RX ORDER — ONDANSETRON 2 MG/ML
4 INJECTION INTRAMUSCULAR; INTRAVENOUS EVERY 4 HOURS PRN
Status: DISCONTINUED | OUTPATIENT
Start: 2019-01-31 | End: 2019-02-02 | Stop reason: HOSPADM

## 2019-01-31 RX ORDER — AMOXICILLIN 250 MG
2 CAPSULE ORAL 2 TIMES DAILY
Status: DISCONTINUED | OUTPATIENT
Start: 2019-01-31 | End: 2019-01-31

## 2019-01-31 RX ORDER — LISINOPRIL 5 MG/1
5 TABLET ORAL DAILY
Status: DISCONTINUED | OUTPATIENT
Start: 2019-02-01 | End: 2019-02-02 | Stop reason: HOSPADM

## 2019-01-31 RX ORDER — HYDROMORPHONE HYDROCHLORIDE 1 MG/ML
0.25 INJECTION, SOLUTION INTRAMUSCULAR; INTRAVENOUS; SUBCUTANEOUS
Status: DISCONTINUED | OUTPATIENT
Start: 2019-01-31 | End: 2019-02-02 | Stop reason: HOSPADM

## 2019-01-31 RX ORDER — LEVOTHYROXINE SODIUM 0.1 MG/1
100 TABLET ORAL DAILY
Status: DISCONTINUED | OUTPATIENT
Start: 2019-02-01 | End: 2019-02-02 | Stop reason: HOSPADM

## 2019-01-31 RX ORDER — ONDANSETRON 4 MG/1
4 TABLET, ORALLY DISINTEGRATING ORAL EVERY 4 HOURS PRN
Status: DISCONTINUED | OUTPATIENT
Start: 2019-01-31 | End: 2019-02-02 | Stop reason: HOSPADM

## 2019-01-31 RX ORDER — SODIUM CHLORIDE 9 MG/ML
INJECTION, SOLUTION INTRAVENOUS CONTINUOUS
Status: DISCONTINUED | OUTPATIENT
Start: 2019-01-31 | End: 2019-02-02 | Stop reason: HOSPADM

## 2019-01-31 RX ORDER — OXYCODONE HYDROCHLORIDE 5 MG/1
5 TABLET ORAL
Status: DISCONTINUED | OUTPATIENT
Start: 2019-01-31 | End: 2019-02-02 | Stop reason: HOSPADM

## 2019-01-31 RX ORDER — POLYETHYLENE GLYCOL 3350 17 G/17G
1 POWDER, FOR SOLUTION ORAL
Status: DISCONTINUED | OUTPATIENT
Start: 2019-01-31 | End: 2019-01-31

## 2019-01-31 RX ORDER — BISACODYL 10 MG
10 SUPPOSITORY, RECTAL RECTAL
Status: DISCONTINUED | OUTPATIENT
Start: 2019-01-31 | End: 2019-02-02 | Stop reason: HOSPADM

## 2019-01-31 RX ORDER — AMOXICILLIN 250 MG
2 CAPSULE ORAL 2 TIMES DAILY
Status: DISCONTINUED | OUTPATIENT
Start: 2019-02-01 | End: 2019-02-02 | Stop reason: HOSPADM

## 2019-01-31 RX ORDER — OXYCODONE HYDROCHLORIDE 5 MG/1
2.5 TABLET ORAL
Status: DISCONTINUED | OUTPATIENT
Start: 2019-01-31 | End: 2019-02-02 | Stop reason: HOSPADM

## 2019-01-31 RX ORDER — POLYETHYLENE GLYCOL 3350 17 G/17G
1 POWDER, FOR SOLUTION ORAL DAILY
Status: DISCONTINUED | OUTPATIENT
Start: 2019-01-31 | End: 2019-02-02 | Stop reason: HOSPADM

## 2019-01-31 RX ORDER — LEVOFLOXACIN 5 MG/ML
750 INJECTION, SOLUTION INTRAVENOUS
Status: DISCONTINUED | OUTPATIENT
Start: 2019-01-31 | End: 2019-02-01

## 2019-01-31 RX ORDER — ACETAMINOPHEN 325 MG/1
650 TABLET ORAL EVERY 6 HOURS PRN
Status: DISCONTINUED | OUTPATIENT
Start: 2019-01-31 | End: 2019-02-02 | Stop reason: HOSPADM

## 2019-01-31 RX ORDER — BISACODYL 10 MG
10 SUPPOSITORY, RECTAL RECTAL
Status: DISCONTINUED | OUTPATIENT
Start: 2019-01-31 | End: 2019-01-31

## 2019-01-31 RX ADMIN — POLYETHYLENE GLYCOL 3350 1 PACKET: 17 POWDER, FOR SOLUTION ORAL at 21:51

## 2019-01-31 RX ADMIN — OXYCODONE HYDROCHLORIDE 5 MG: 5 TABLET ORAL at 21:50

## 2019-01-31 RX ADMIN — SODIUM CHLORIDE: 9 INJECTION, SOLUTION INTRAVENOUS at 21:52

## 2019-01-31 ASSESSMENT — ENCOUNTER SYMPTOMS
CONSTIPATION: 0
MYALGIAS: 0
SENSORY CHANGE: 0
MEMORY LOSS: 0
WEAKNESS: 0
PHOTOPHOBIA: 0
STRIDOR: 0
NAUSEA: 0
SORE THROAT: 0
EYE PAIN: 0
CLAUDICATION: 0
TREMORS: 0
DEPRESSION: 0
TINGLING: 0
BLOOD IN STOOL: 0
FEVER: 0
SPEECH CHANGE: 0
DIZZINESS: 0
PALPITATIONS: 0
HEADACHES: 0
NERVOUS/ANXIOUS: 0
NECK PAIN: 0
CHILLS: 0
PND: 0
HEARTBURN: 0
DOUBLE VISION: 0
SHORTNESS OF BREATH: 0
HEMOPTYSIS: 0
ABDOMINAL PAIN: 1
BACK PAIN: 0
COUGH: 0
VOMITING: 0
ORTHOPNEA: 0
BLURRED VISION: 0
SPUTUM PRODUCTION: 0

## 2019-01-31 ASSESSMENT — PATIENT HEALTH QUESTIONNAIRE - PHQ9
1. LITTLE INTEREST OR PLEASURE IN DOING THINGS: NOT AT ALL
SUM OF ALL RESPONSES TO PHQ9 QUESTIONS 1 AND 2: 0
2. FEELING DOWN, DEPRESSED, IRRITABLE, OR HOPELESS: NOT AT ALL

## 2019-01-31 ASSESSMENT — LIFESTYLE VARIABLES: EVER_SMOKED: YES

## 2019-01-31 NOTE — PROGRESS NOTES
Direct admit from [facility] ED, referred by Dr. Hooks. For Hyperbilirubinemia, Post-op Stent failure, transaminitis. Admitting MD is Dr. Srivastava. ADT order signed and held, to be released upon patient's arrival on the unit. Page admit hospitalist on call for orders when patient arrives.

## 2019-02-01 ENCOUNTER — APPOINTMENT (OUTPATIENT)
Dept: RADIOLOGY | Facility: MEDICAL CENTER | Age: 74
DRG: 445 | End: 2019-02-01
Attending: SURGERY
Payer: MEDICARE

## 2019-02-01 LAB
ALBUMIN SERPL BCP-MCNC: 3.1 G/DL (ref 3.2–4.9)
ALBUMIN/GLOB SERPL: 1.2 G/DL
ALP SERPL-CCNC: 233 U/L (ref 30–99)
ALT SERPL-CCNC: 262 U/L (ref 2–50)
ANION GAP SERPL CALC-SCNC: 7 MMOL/L (ref 0–11.9)
AST SERPL-CCNC: 104 U/L (ref 12–45)
BASOPHILS # BLD AUTO: 0.1 % (ref 0–1.8)
BASOPHILS # BLD: 0.02 K/UL (ref 0–0.12)
BILIRUB SERPL-MCNC: 3 MG/DL (ref 0.1–1.5)
BUN SERPL-MCNC: 7 MG/DL (ref 8–22)
CALCIUM SERPL-MCNC: 7.9 MG/DL (ref 8.4–10.2)
CHLORIDE SERPL-SCNC: 100 MMOL/L (ref 96–112)
CO2 SERPL-SCNC: 21 MMOL/L (ref 20–33)
CREAT SERPL-MCNC: 0.68 MG/DL (ref 0.5–1.4)
EOSINOPHIL # BLD AUTO: 0 K/UL (ref 0–0.51)
EOSINOPHIL NFR BLD: 0 % (ref 0–6.9)
ERYTHROCYTE [DISTWIDTH] IN BLOOD BY AUTOMATED COUNT: 40.6 FL (ref 35.9–50)
GLOBULIN SER CALC-MCNC: 2.5 G/DL (ref 1.9–3.5)
GLUCOSE SERPL-MCNC: 95 MG/DL (ref 65–99)
HCT VFR BLD AUTO: 37.8 % (ref 37–47)
HGB BLD-MCNC: 13 G/DL (ref 12–16)
IMM GRANULOCYTES # BLD AUTO: 0.1 K/UL (ref 0–0.11)
IMM GRANULOCYTES NFR BLD AUTO: 0.6 % (ref 0–0.9)
INR PPP: 1.2 (ref 0.87–1.13)
LYMPHOCYTES # BLD AUTO: 0.49 K/UL (ref 1–4.8)
LYMPHOCYTES NFR BLD: 2.9 % (ref 22–41)
MCH RBC QN AUTO: 30.8 PG (ref 27–33)
MCHC RBC AUTO-ENTMCNC: 34.4 G/DL (ref 33.6–35)
MCV RBC AUTO: 89.6 FL (ref 81.4–97.8)
MONOCYTES # BLD AUTO: 1.2 K/UL (ref 0–0.85)
MONOCYTES NFR BLD AUTO: 7.1 % (ref 0–13.4)
NEUTROPHILS # BLD AUTO: 15.2 K/UL (ref 2–7.15)
NEUTROPHILS NFR BLD: 89.3 % (ref 44–72)
NRBC # BLD AUTO: 0 K/UL
NRBC BLD-RTO: 0 /100 WBC
PLATELET # BLD AUTO: 180 K/UL (ref 164–446)
PMV BLD AUTO: 10.6 FL (ref 9–12.9)
POTASSIUM SERPL-SCNC: 3.8 MMOL/L (ref 3.6–5.5)
PROT SERPL-MCNC: 5.6 G/DL (ref 6–8.2)
PROTHROMBIN TIME: 15.1 SEC (ref 12–14.6)
RBC # BLD AUTO: 4.22 M/UL (ref 4.2–5.4)
SODIUM SERPL-SCNC: 128 MMOL/L (ref 135–145)
WBC # BLD AUTO: 17 K/UL (ref 4.8–10.8)

## 2019-02-01 PROCEDURE — A9270 NON-COVERED ITEM OR SERVICE: HCPCS | Performed by: HOSPITALIST

## 2019-02-01 PROCEDURE — 36415 COLL VENOUS BLD VENIPUNCTURE: CPT

## 2019-02-01 PROCEDURE — A9537 TC99M MEBROFENIN: HCPCS

## 2019-02-01 PROCEDURE — 80053 COMPREHEN METABOLIC PANEL: CPT

## 2019-02-01 PROCEDURE — 85025 COMPLETE CBC W/AUTO DIFF WBC: CPT

## 2019-02-01 PROCEDURE — 700111 HCHG RX REV CODE 636 W/ 250 OVERRIDE (IP): Performed by: HOSPITALIST

## 2019-02-01 PROCEDURE — 85610 PROTHROMBIN TIME: CPT

## 2019-02-01 PROCEDURE — A9270 NON-COVERED ITEM OR SERVICE: HCPCS | Performed by: INTERNAL MEDICINE

## 2019-02-01 PROCEDURE — 700101 HCHG RX REV CODE 250: Performed by: SURGERY

## 2019-02-01 PROCEDURE — 700102 HCHG RX REV CODE 250 W/ 637 OVERRIDE(OP): Performed by: INTERNAL MEDICINE

## 2019-02-01 PROCEDURE — 770006 HCHG ROOM/CARE - MED/SURG/GYN SEMI*

## 2019-02-01 PROCEDURE — 99232 SBSQ HOSP IP/OBS MODERATE 35: CPT | Performed by: INTERNAL MEDICINE

## 2019-02-01 PROCEDURE — 700102 HCHG RX REV CODE 250 W/ 637 OVERRIDE(OP): Performed by: HOSPITALIST

## 2019-02-01 RX ADMIN — METRONIDAZOLE 500 MG: 500 INJECTION, SOLUTION INTRAVENOUS at 15:50

## 2019-02-01 RX ADMIN — LISINOPRIL 5 MG: 5 TABLET ORAL at 05:08

## 2019-02-01 RX ADMIN — LEVOTHYROXINE SODIUM 100 MCG: 100 TABLET ORAL at 05:07

## 2019-02-01 RX ADMIN — OXYCODONE HYDROCHLORIDE 5 MG: 5 TABLET ORAL at 15:48

## 2019-02-01 RX ADMIN — OXYCODONE HYDROCHLORIDE 5 MG: 5 TABLET ORAL at 22:39

## 2019-02-01 RX ADMIN — METRONIDAZOLE 500 MG: 500 INJECTION, SOLUTION INTRAVENOUS at 22:03

## 2019-02-01 RX ADMIN — LEVOFLOXACIN 750 MG: 5 INJECTION, SOLUTION INTRAVENOUS at 05:14

## 2019-02-01 RX ADMIN — ASPIRIN 81 MG: 81 TABLET, COATED ORAL at 05:07

## 2019-02-01 RX ADMIN — METRONIDAZOLE 500 MG: 500 INJECTION, SOLUTION INTRAVENOUS at 10:19

## 2019-02-01 RX ADMIN — OXYCODONE HYDROCHLORIDE 5 MG: 5 TABLET ORAL at 05:07

## 2019-02-01 RX ADMIN — OXYCODONE HYDROCHLORIDE 5 MG: 5 TABLET ORAL at 19:35

## 2019-02-01 RX ADMIN — STANDARDIZED SENNA CONCENTRATE AND DOCUSATE SODIUM 2 TABLET: 8.6; 5 TABLET, FILM COATED ORAL at 18:25

## 2019-02-01 RX ADMIN — STANDARDIZED SENNA CONCENTRATE AND DOCUSATE SODIUM 2 TABLET: 8.6; 5 TABLET, FILM COATED ORAL at 05:08

## 2019-02-01 ASSESSMENT — ENCOUNTER SYMPTOMS
NERVOUS/ANXIOUS: 1
DIZZINESS: 0
EYE DISCHARGE: 0
ABDOMINAL PAIN: 1
EYE REDNESS: 0
HEADACHES: 0
SORE THROAT: 0
SHORTNESS OF BREATH: 0
CHILLS: 0
COUGH: 0
FEVER: 0
CONSTIPATION: 1
VOMITING: 0
NAUSEA: 0
BACK PAIN: 1

## 2019-02-01 NOTE — PROGRESS NOTES
New admit from [facility].arrived via ambulance at 1700.pt put in system now.  Pt aa&o x 4.wants something to drink and eat.  Call placed to  for orders.awaiting call back.

## 2019-02-01 NOTE — CARE PLAN
Problem: Safety  Goal: Will remain free from injury  Outcome: PROGRESSING AS EXPECTED    Intervention: Provide assistance with mobility   02/01/19 0338   OTHER   Assistance / Tolerance Assistance of One;Tolerates Well   Pt educated to call for assistance when needed.       Problem: Pain Management  Goal: Pain level will decrease to patient's comfort goal  Outcome: PROGRESSING AS EXPECTED   02/01/19 0338   OTHER   Non Verbal Scale  Calm;Sleeping;Unlabored Breathing   Pt educated to call for pain medication when needed.

## 2019-02-01 NOTE — PROGRESS NOTES
Spoke with Sean from [facility] pharmacy regarding Levaquin dose showing as due at this time. Per MD note, pt already received a dose today and plan was to continue in the morning. Medication rescheduled to begin tomorrow by pharmacist Sean.

## 2019-02-01 NOTE — H&P
Hospital Medicine History & Physical Note    Date of Service  1/31/2019    Primary Care Physician  Damien Phillips M.D.    Consultants   [facility]    Code Status  Full    Chief Complaint  RUQ pain.    History of Presenting Illness  73 y.o. female with past medical history of hypothyroidism, hypertension, dyslipidemia who initially presented to [facility] on 1/31/2019 for evaluation of right upper quadrant pain which she describes as sharp sensation radiating across her back and abdomen, associated with nausea vomiting.  Patient apparently 3 days prior to admission had a common bile duct placement.  The reason why she had a stent placement was, she has a history of biliary duct stenosis which was diagnosed 10 years ago and apparently at the time of initial diagnosis she had a drain placed for over 6 months.  Nevertheless a stent was recently placed by Dr. Moore, for noted abdominal pain and transaminitis on 20 January.  Hence during admission an ultrasound of the abdomen was performed which showed no evidence of a duct stent in place with marked dilatation of the gallbladder as well.  As result the physicians at [facility] discussed case with GI which they were advised to start the patient on IV antibiotics, to perform a CT of her abdomen which again showed marked gallbladder distention with wall thickening, pericholecystic fluid, suspicious for cholecystitis in addition to common bile ductal dilatation.  As a result patient was transferred to [facility] for gastroenterology consultation.  GI consultants was contacted, awaiting further recommendations    Patient is clinically stable denies having any fevers chest pain shortness of breath or nausea vomiting.  She Rudolph had a dose of IV Levaquin today.          Review of Systems  Review of Systems   Constitutional: Negative for chills, fever and malaise/fatigue.   HENT: Negative for congestion, hearing loss, sore throat  and tinnitus.    Eyes: Negative for blurred vision, double vision, photophobia and pain.   Respiratory: Negative for cough, hemoptysis, sputum production, shortness of breath and stridor.    Cardiovascular: Negative for chest pain, palpitations, orthopnea, claudication and PND.   Gastrointestinal: Positive for abdominal pain. Negative for blood in stool, constipation, heartburn, melena, nausea and vomiting.   Genitourinary: Negative for dysuria, frequency and urgency.   Musculoskeletal: Negative for back pain, myalgias and neck pain.   Neurological: Negative for dizziness, tingling, tremors, sensory change, speech change, weakness and headaches.   Psychiatric/Behavioral: Negative for depression, memory loss and suicidal ideas. The patient is not nervous/anxious.        Past Medical History   has a past medical history of Arrhythmia; Bowel habit changes (01/2019); Cataract; High cholesterol; Hypertension; Hypothyroid; Jaundice; and Stroke (HCC) (07/2018).    Surgical History   has a past surgical history that includes ercp (12/13/2018); appendectomy (1952); laparotomy (1953); gastroscopy (N/A, 1/7/2019); egd with asp/bx (N/A, 1/7/2019); ercp (1/28/2019); ercp-diagnostic w/biopsy (1/28/2019); and ercp w/ insertion stent/tube (1/28/2019).     Family History  family history includes Cancer in her mother.     Social History   reports that she quit smoking about 19 years ago. She has never used smokeless tobacco. She reports that she drinks alcohol. She reports that she does not use drugs.    Allergies  No Known Allergies    Medications  Prior to Admission Medications   Prescriptions Last Dose Informant Patient Reported? Taking?   Cholecalciferol (VITAMIN D3) 2000 UNIT Cap   Yes No   Sig: Take 2,000 Units by mouth every day.   Cyanocobalamin (VITAMIN B-12) 1000 MCG Tab   Yes No   Sig: Take 2,000 mcg by mouth every day.   amoxicillin-clavulanate (AUGMENTIN) 875-125 MG Tab   No No   Sig: Take 1 Tab by mouth every 12 hours  for 10 doses.   aspirin EC (ECOTRIN) 81 MG Tablet Delayed Response   Yes No   Sig: Take 1 Tab by mouth every day.   atorvastatin (LIPITOR) 40 MG Tab   Yes No   Sig: Take 40 mg by mouth every evening.   levothyroxine (SYNTHROID) 100 MCG Tab   Yes No   Sig: Take 100 mcg by mouth every day.   lisinopril (PRINIVIL) 5 MG Tab   Yes No   Sig: Take 5 mg by mouth every day.   tramadol (ULTRAM) 50 MG Tab   No No   Sig: Take 1 Tab by mouth every four hours as needed for Moderate Pain for up to 30 doses.      Facility-Administered Medications: None       Physical Exam  Temp:  [36.8 °C (98.2 °F)] 36.8 °C (98.2 °F)  Pulse:  [66] 66  Resp:  [16] 16  BP: (147)/(73) 147/73  SpO2:  [95 %] 95 %    Physical Exam   Constitutional: She is oriented to person, place, and time. She appears well-developed and well-nourished. No distress.   HENT:   Head: Normocephalic and atraumatic.   Mouth/Throat: No oropharyngeal exudate.   Eyes: Pupils are equal, round, and reactive to light. Conjunctivae are normal. Right eye exhibits no discharge. No scleral icterus.   Neck: Neck supple. No JVD present. No thyromegaly present.   Cardiovascular: Normal rate and intact distal pulses.    No murmur heard.  Pulmonary/Chest: Effort normal and breath sounds normal. No stridor. No respiratory distress. She has no wheezes. She has no rales.   Abdominal: Soft. Bowel sounds are normal. She exhibits no distension and no mass. There is tenderness (Mild tenderness right upper quadrant on deep palpation). There is no rebound and no guarding.   Musculoskeletal: Normal range of motion. She exhibits no edema.   Neurological: She is alert and oriented to person, place, and time. No cranial nerve deficit.   Skin: Skin is warm. She is not diaphoretic. No erythema.   Psychiatric: She has a normal mood and affect. Her behavior is normal. Thought content normal.       Laboratory:          No results for input(s): ALTSGPT, ASTSGOT, ALKPHOSPHAT, TBILIRUBIN, DBILIRUBIN, GAMMAGT,  AMYLASE, LIPASE, ALB, PREALBUMIN, GLUCOSE in the last 72 hours.              No results for input(s): TROPONINI in the last 72 hours.    Urinalysis:    No results found     Imaging:  Prior abdominal ultrasound and CT imaging done at [facility] in chart. This was reviewed.    Assessment/Plan:  I anticipate this patient will require at least two midnights for appropriate medical management, necessitating inpatient admission.    * Cholangitis   Assessment & Plan    Noted on CT imaging from outlying facility.with biliary obstruction, gallbladder distention.   Although imaging findings consistent with cholecystitis, in light of biliary obstruction, I am also concerned about Cholangitis.  GI was consulted awaiting for further recommendations.  IV levaquin already given today, will continue in the am  Depending on procedures or recommendations per GI, consider surgical consultation afterwards.  Certainly because of history of biliary stenosis, and obstructions this would as a result cause gallbladder obstruction and distention.     Transaminitis   Assessment & Plan    In comparison from labs from outlying facility AST is improved from 211 2/1/1934, ALT went from 373-322, alkaline phosphatase holding it to 25, total bilirubin at 2.8   this is all related to biliary obstruction  As above GI was consulted, waiting for further recommendations.     Hyponatremia   Assessment & Plan    2/2 to dehydration  IV fluids started, recheck bmp in the am     Biliary obstruction   Assessment & Plan    Prolonged history of biliary stenosis, stent placement, now noted common bile duct dilatation  GI consulted, waiting for recommendations  IV antibiotics were also given today, continue     Hypothyroid- (present on admission)   Assessment & Plan    Dose of Synthroid 100 mcg daily     Hypertension- (present on admission)   Assessment & Plan    We will continue her home dose of lisinopril  We will controlled overall         VTE  prophylaxis: SCDs

## 2019-02-01 NOTE — CARE PLAN
Problem: Nutritional:  Goal: Achieve adequate nutritional intake  Advancement of diet by MD beyond clear liquids when medically feasible. Patient will consume >50% of meals.  Outcome: NOT MET

## 2019-02-01 NOTE — ASSESSMENT & PLAN NOTE
Noted on CT imaging from outlying facility.with biliary obstruction, gallbladder distention.   HIDA has nonvisualization of the gallbladder consistent with cystic duct obstruction  Await hepatobiliary surgical consultation this evening

## 2019-02-01 NOTE — ASSESSMENT & PLAN NOTE
Prior history of biliary stenosis and stent  Now with nonvisualization of gallbladder  Pending surgical consultation

## 2019-02-01 NOTE — PROGRESS NOTES
Gastroenterology Progress Note     Author: Columba Thomas   Date & Time Created: 2/1/2019 11:50 AM    Chief Complaint:  RUQ abd pain    Interval History:  2/1/19:  No bowel movement for a week.  Had first portion of HIDA and goes back this afternoon for another image.  Daughter at bedside with appropriate questions re: plan.    Review of Systems:  Review of Systems   Constitutional: Negative for chills and fever.   Respiratory: Negative for cough.    Cardiovascular: Negative for chest pain.   Gastrointestinal: Positive for abdominal pain and constipation. Negative for vomiting.       Physical Exam:  Physical Exam   Constitutional: She is oriented to person, place, and time. She appears well-developed and well-nourished.   Cardiovascular: Normal rate and regular rhythm.    Pulmonary/Chest: Effort normal and breath sounds normal.   Abdominal: Soft. Bowel sounds are normal. She exhibits no distension. There is tenderness. There is no rebound and no guarding.   Musculoskeletal: Normal range of motion.   Neurological: She is alert and oriented to person, place, and time.   Skin: Skin is warm and dry.       Labs:          Recent Labs      01/31/19 1935 02/01/19 0524   SODIUM  127*  128*   POTASSIUM  3.7  3.8   CHLORIDE  97  100   CO2  22  21   BUN  8  7*   CREATININE  0.69  0.68   MAGNESIUM  1.6   --    CALCIUM  8.3*  7.9*     Recent Labs      01/31/19 1935 02/01/19 0524   ALTSGPT  322*  262*   ASTSGOT  134*  104*   ALKPHOSPHAT  225*  233*   TBILIRUBIN  2.8*  3.0*   GLUCOSE  107*  95     Recent Labs      01/31/19 1935 02/01/19 0524   RBC  4.53  4.22   HEMOGLOBIN  13.8  13.0   HEMATOCRIT  40.2  37.8   PLATELETCT  182  180   PROTHROMBTM   --   15.1*   INR   --   1.20*     Recent Labs      01/31/19 1935 02/01/19 0524   WBC  16.0*  17.0*   NEUTSPOLYS  91.40*  89.30*   LYMPHOCYTES  1.60*  2.90*   MONOCYTES  6.40  7.10   EOSINOPHILS  0.00  0.00   BASOPHILS  0.20  0.10   ASTSGOT  134*  104*   ALTSGPT   322*  262*   ALKPHOSPHAT  225*  233*   TBILIRUBIN  2.8*  3.0*     Hemodynamics:  Temp (24hrs), Av °C (98.6 °F), Min:36.6 °C (97.8 °F), Max:37.7 °C (99.8 °F)  Temperature: 37.1 °C (98.7 °F)  Pulse  Av.8  Min: 63  Max: 93   Blood Pressure : 121/53     Respiratory:    Respiration: 19, Pulse Oximetry: 98 %           Fluids:  No intake or output data in the 24 hours ending 19 1150  Weight: 53 kg (116 lb 13.5 oz)  GI/Nutrition:  Orders Placed This Encounter   Procedures   • Diet Order Clear Liquids - No Red Foods     Standing Status:   Standing     Number of Occurrences:   1     Order Specific Question:   Diet:     Answer:   Clear Liquids - No Red Foods [12]     Medical Decision Making, by Problem:  Active Hospital Problems    Diagnosis   • *Cholangitis [K83.09]   • Biliary obstruction [K83.1]   • Hyponatremia [E87.1]   • Transaminitis [R74.0]   • Hypertension [I10]   • Hypothyroid [E03.9]     IMPRESSION:  1.  Right upper quadrant pain.  At this point,  suspect her pain is actually   from cholecystitis.  Question whether or not placement  of this covered stent did indeed cause either distortion or blockage of the cystic duct and subsequent development of cholecystitis.    2.  Abnormal liver enzymes  3.  Biliary stricture: plan was to keep her fully covered biliary stent in place for at least a couple of months.  4.  Cholecystitis  5.  Leukocytosis  6.  Hyponatremia  5.  Constipation.     Plan:  --Dr. Moore has consulted Dr. Cannon for surgical consultation.  HIDA scan has been ordered.  Awaiting results  --On Levaquin and Flagyl  --Recommend suppository for now.  Spoke with nursing      Quality-Core Measures

## 2019-02-01 NOTE — CONSULTS
DATE OF SERVICE:  01/31/2019    GASTROENTEROLOGY CONSULTATION    REQUESTED BY:  Mela Srivastava MD    REASON FOR REQUEST:  Right upper quadrant abdominal pain.    HISTORY OF PRESENT ILLNESS:  The patient is a 73-year-old female who has a   very distant history of a biliary exploratory laparoscopy when she was 8   years old.  It is not completely clear what occurred during this time, of   course, it was 65 years ago.  She notes that it sounds like T tube placed   percutaneously and left in place for 6 months.  After the procedure, she   believes she did have reconstruction of her bile ducts and at that time and   then did well up until this year, over the last 1 month where she began to   develop abdominal discomfort and was noted to have jaundice, and at which   point, imaging and ERCPs revealed a very tight, common hepatic duct stricture.    She has had some failed ERCPs to clarify this and recently on 01/31/2019 had   a repeat ERCP with Dr. Haroon Moore was able to cannulate through this very   tight stricture in the common hepatic duct with upstream dilation of the   biliary system with a wire and then placed a fully covered metal stent.  The   plan here was to repeat, it was to obviously bridge this stricture, but   eventually removed the stent after the biliary duct have been remodeled.  The   patient went home, she did have some right upper quadrant discomfort at that   time, but then this went away until today she began to develop episodes of   nausea and vomiting as well as recurrence of her pain in the right upper   quadrant with radiation around to her back associated with nausea, coming on   with eating food and also noted that the pain at this time was not going away.    She was told to present here for evaluation after initially presenting in   [facility], whereby CT scan revealed evidence of what appears to be   cholecystitis as well as indwelling stent and intrahepatic ductal dilation.    She  states she is feeling better after some pain meds.  She does complain of   constipation since she has not had a bowel movement in 3 days.  She does note   some dark urine at times, although was clear other times and most recently   states her urine was clear.  No noted change in the color of her stool,   although she has not had a bowel movement now for a few days, she states.  She   denies any chest pain, no shortness of breath at this time.  Review of ERCP,   again tight, hepatic duct stricture, fully covered metal stent placed on   01/28/2019.  Biopsies were obtained of the stricture and pathology has been   returned and is negative for malignancy.    REVIEW OF SYSTEMS:  Positive as noted above, otherwise complete review of   systems is negative other than hard of hearing.    PAST MEDICAL HISTORY:  Includes biliary stricture, hypothyroidism, stroke,   hyperlipidemia, and hypertension.    ALLERGIES:  No known drug allergies.    PAST SURGICAL HISTORY:  Exploratory laparotomy at age 8 with T-tube   placement.    MEDICATIONS:  Vitamin D, Augmentin as an outpatient, Lipitor, Synthroid,   lisinopril, and tramadol.  Now as an inpatient, she is on IV levothyroxine as   well as Dilaudid as needed and oxycodone.  She does drink 1-2 alcoholic   beverages per night.    SOCIAL HISTORY:  She is a nonsmoker, quitting 19 years ago.    FAMILY MEDICAL HISTORY:  Noncontributory.    PHYSICAL EXAMINATION:  VITAL SIGNS:  Blood pressure 147/73, heart rate of 66, respiratory rate 16,   afebrile, satting 95% on room air.  GENERAL:  She is a very pleasant, thin white female in no acute distress.  HEENT:  Reveals that her extraocular movements are intact bilaterally.  Her   sclerae are not icteric bilaterally.  Oral exam reveals a Mallampati 2 score   without oral lesions.  CARDIOVASCULAR:  Regular rate and rhythm without murmurs.  LUNGS:  Clear to auscultation bilaterally.  ABDOMEN:  Soft, there is some mild tenderness to palpation in  the right upper   quadrant just under the right rib line.  No rebound tenderness appreciated.    No masses or organomegaly appreciated.  SKIN:  Again, grossly free of jaundice or rashes.  EXTREMITIES:  Evaluation reveals no pitting edema bilaterally.    LABORATORY DATA:  CBC reveals a white blood cell count 4.4, hemoglobin 12,   hematocrit 36, platelet count 196.  BMP prior to transfer revealed a sodium of   128, potassium 3.8, chloride of 96, bicarb 22, BUN 12, creatinine 0.72, AST   211, , alkaline phosphatase 277, total bilirubin 2.2.    IMAGING:  Ultrasound reveals common bile duct stent in place and dilated   gallbladder and CT scan of the abdomen reveals marked gallbladder distention   with wall thickening and pericholecystic fluid, highly suggestive of   cholecystitis, indwelling biliary stent as well as dilated intrahepatic ducts   and diverticulosis.    IMPRESSION, PLAN AND MEDICAL DECISION MAKIN.  Right upper quadrant pain.  At this point, I suspect her pain is actually   from cholecystitis.  Her total bilirubin is 2.2, which is not down to normal.    We will have to trend that.  At this point, I would expect complete emptying   here and continue to follow that.  I have to question whether or not placement   of this covered stent did indeed cause either distortion or blockage of the   cystic duct and subsequent development of cholecystitis.  At this point, we   will keep her on clear liquids.  She looks good.  She is afebrile.  We will   obviously continue her IV antibiotics and I believe she needs to be seen by   general surgery for consideration of cholecystectomy.  It is absolutely   imperative that we leave this stent in if at all possible to try to remodel   her biliary duct and rectify this very tight biliary stricture.  It is   otherwise surgical repair of this would be a pretty extensive operation.  I   have discussed this possibility with the patient.  She is anxious to get to   the  bottom of this and will stay as an inpatient while this is sorted out.  2.  Abnormal liver enzymes, again will trend the total bilirubin, I would   certainly like to see that normalized, but again right now have to wait for a   couple of other points in time if she were going with that.  3.  Biliary stricture, see discussion above, will need to keep her fully   covered biliary stent in place for at least a couple of months.  4.  Cholecystitis, see discussion above, I do recommend consultation by   general surgery.  5.  Constipation.  I have asked the nursing to give her a dosage of the   MiraLax at this point, she is complaining of constipation and she is   tolerating clear liquids.  So, I think this should fine.  6.  I will recheck CMP and INR in the morning and I will turn her over to my   partner Dr. Thomas who will be following from here.       ____________________________________     MD GERALD BLANTON / LAVELLE    DD:  01/31/2019 20:30:14  DT:  01/31/2019 23:27:39    D#:  [id number]  Job#:  [id number]

## 2019-02-01 NOTE — PROGRESS NOTES
Received report from Chantal MCNEILL. Assumed care. This pt is AOx4, reports pain, will medicate per MAR. Patient and RN discussed plan of care including pain and nausea management, consult by general surgery, IV fluids, IV antibiotics : questions answered. Chart reviewed. Call light in place, fall precautions in place, patient educated on importance of calling for assistance. No additional needs at this time.

## 2019-02-01 NOTE — DIETARY
"Nutrition services: Day 1 of admit.  Clementine Hopper is a 73 y.o. female with admitting DX of hyperbilirubinemia. Pt with dx of cholangitis, biliary obsruction, hyponatremia, transaminitis, HTN and hypothyroid. GI MD suspects pain to right upper quadrant is related to cholecystitis. Pt also with complaints of constipation. Current diet order may not encourage regularity due to very restrictive and low in fiber. MD recommended suppository to encourage BM. RD met with pt to discuss weight loss and poor PO intake PTA. Significant weight loss noted of 12# (9.2%) x 3 weeks due to diminished intake to prevent discomfort. Pt avoided many foods including those high in fat to prevent discomfort.       Assessment:  Height: 160 cm (5' 3\")  Weight: 53 kg (116 lb 13.5 oz)  Body mass index is 20.7 kg/m².   YJU=177#  Diet/Intake: Clear liquids/sporadic PO with <25% x 1 meal and 50-75% x 1 meal    Evaluation:   1. GI: last BM on 1/25/19    Malnutrition Risk: Pt may be malnourished AEB significant weight loss noted x 3 weeks and diminished PO intake.     Recommendations/Plan:  1. Provide diet as ordered.  2. Advancement of diet when medically feasible. If advancement not possible, MD may want to consider initiation of nutrition support.   3. Encourage intake of >50%   4. Document intake of all meals  as % taken in ADL's to provide interdisciplinary communication across all shifts.   5. Monitor weight.  6. Nutrition rep will continue to see patient for ongoing meal and snack preferences.   7. RD will monitor            "

## 2019-02-02 ENCOUNTER — HOSPITAL ENCOUNTER (OUTPATIENT)
Facility: MEDICAL CENTER | Age: 74
DRG: 405 | End: 2019-02-02
Admitting: INTERNAL MEDICINE
Payer: MEDICARE

## 2019-02-02 ENCOUNTER — HOSPITAL ENCOUNTER (INPATIENT)
Facility: MEDICAL CENTER | Age: 74
LOS: 7 days | DRG: 405 | End: 2019-02-09
Attending: INTERNAL MEDICINE | Admitting: INTERNAL MEDICINE
Payer: MEDICARE

## 2019-02-02 VITALS
OXYGEN SATURATION: 95 % | DIASTOLIC BLOOD PRESSURE: 54 MMHG | HEART RATE: 78 BPM | HEIGHT: 63 IN | SYSTOLIC BLOOD PRESSURE: 136 MMHG | BODY MASS INDEX: 20.7 KG/M2 | RESPIRATION RATE: 18 BRPM | WEIGHT: 116.84 LBS | TEMPERATURE: 99.1 F

## 2019-02-02 DIAGNOSIS — K75.0 LIVER ABSCESS: ICD-10-CM

## 2019-02-02 LAB
ALBUMIN SERPL BCP-MCNC: 2.5 G/DL (ref 3.2–4.9)
ALBUMIN/GLOB SERPL: 1.1 G/DL
ALP SERPL-CCNC: 214 U/L (ref 30–99)
ALT SERPL-CCNC: 151 U/L (ref 2–50)
ANION GAP SERPL CALC-SCNC: 8 MMOL/L (ref 0–11.9)
AST SERPL-CCNC: 44 U/L (ref 12–45)
BILIRUB SERPL-MCNC: 3.9 MG/DL (ref 0.1–1.5)
BUN SERPL-MCNC: 6 MG/DL (ref 8–22)
CALCIUM SERPL-MCNC: 7.8 MG/DL (ref 8.4–10.2)
CHLORIDE SERPL-SCNC: 100 MMOL/L (ref 96–112)
CO2 SERPL-SCNC: 20 MMOL/L (ref 20–33)
CREAT SERPL-MCNC: 0.83 MG/DL (ref 0.5–1.4)
GLOBULIN SER CALC-MCNC: 2.3 G/DL (ref 1.9–3.5)
GLUCOSE SERPL-MCNC: 85 MG/DL (ref 65–99)
POTASSIUM SERPL-SCNC: 3.6 MMOL/L (ref 3.6–5.5)
PROT SERPL-MCNC: 4.8 G/DL (ref 6–8.2)
SODIUM SERPL-SCNC: 128 MMOL/L (ref 135–145)

## 2019-02-02 PROCEDURE — A9270 NON-COVERED ITEM OR SERVICE: HCPCS | Performed by: INTERNAL MEDICINE

## 2019-02-02 PROCEDURE — 99223 1ST HOSP IP/OBS HIGH 75: CPT | Mod: 57 | Performed by: SURGERY

## 2019-02-02 PROCEDURE — 80053 COMPREHEN METABOLIC PANEL: CPT

## 2019-02-02 PROCEDURE — 770001 HCHG ROOM/CARE - MED/SURG/GYN PRIV*

## 2019-02-02 PROCEDURE — 700101 HCHG RX REV CODE 250: Performed by: SURGERY

## 2019-02-02 PROCEDURE — 700105 HCHG RX REV CODE 258: Performed by: INTERNAL MEDICINE

## 2019-02-02 PROCEDURE — A9270 NON-COVERED ITEM OR SERVICE: HCPCS | Performed by: HOSPITALIST

## 2019-02-02 PROCEDURE — 700102 HCHG RX REV CODE 250 W/ 637 OVERRIDE(OP): Performed by: HOSPITALIST

## 2019-02-02 PROCEDURE — 36415 COLL VENOUS BLD VENIPUNCTURE: CPT

## 2019-02-02 PROCEDURE — 700111 HCHG RX REV CODE 636 W/ 250 OVERRIDE (IP): Performed by: INTERNAL MEDICINE

## 2019-02-02 PROCEDURE — 700101 HCHG RX REV CODE 250: Performed by: INTERNAL MEDICINE

## 2019-02-02 PROCEDURE — 99222 1ST HOSP IP/OBS MODERATE 55: CPT | Mod: AI | Performed by: INTERNAL MEDICINE

## 2019-02-02 PROCEDURE — 700102 HCHG RX REV CODE 250 W/ 637 OVERRIDE(OP): Performed by: INTERNAL MEDICINE

## 2019-02-02 RX ORDER — POLYETHYLENE GLYCOL 3350 17 G/17G
1 POWDER, FOR SOLUTION ORAL DAILY
Status: CANCELLED | OUTPATIENT
Start: 2019-02-03

## 2019-02-02 RX ORDER — ACETAMINOPHEN 325 MG/1
650 TABLET ORAL EVERY 6 HOURS PRN
Status: CANCELLED | OUTPATIENT
Start: 2019-02-02

## 2019-02-02 RX ORDER — BISACODYL 10 MG
10 SUPPOSITORY, RECTAL RECTAL
Status: CANCELLED | OUTPATIENT
Start: 2019-02-02

## 2019-02-02 RX ORDER — LISINOPRIL 5 MG/1
5 TABLET ORAL DAILY
Status: DISCONTINUED | OUTPATIENT
Start: 2019-02-03 | End: 2019-02-05

## 2019-02-02 RX ORDER — LEVOTHYROXINE SODIUM 0.05 MG/1
100 TABLET ORAL
Status: DISCONTINUED | OUTPATIENT
Start: 2019-02-03 | End: 2019-02-04

## 2019-02-02 RX ORDER — ONDANSETRON 2 MG/ML
4 INJECTION INTRAMUSCULAR; INTRAVENOUS EVERY 4 HOURS PRN
Status: DISCONTINUED | OUTPATIENT
Start: 2019-02-02 | End: 2019-02-09 | Stop reason: HOSPADM

## 2019-02-02 RX ORDER — AMOXICILLIN 250 MG
2 CAPSULE ORAL 2 TIMES DAILY
Status: CANCELLED | OUTPATIENT
Start: 2019-02-02

## 2019-02-02 RX ORDER — OXYCODONE HYDROCHLORIDE 5 MG/1
2.5 TABLET ORAL
Status: DISCONTINUED | OUTPATIENT
Start: 2019-02-02 | End: 2019-02-04

## 2019-02-02 RX ORDER — AMOXICILLIN 250 MG
2 CAPSULE ORAL 2 TIMES DAILY
Status: DISCONTINUED | OUTPATIENT
Start: 2019-02-03 | End: 2019-02-09 | Stop reason: HOSPADM

## 2019-02-02 RX ORDER — LEVOTHYROXINE SODIUM 0.1 MG/1
100 TABLET ORAL DAILY
Status: CANCELLED | OUTPATIENT
Start: 2019-02-03

## 2019-02-02 RX ORDER — HYDROMORPHONE HYDROCHLORIDE 1 MG/ML
0.25 INJECTION, SOLUTION INTRAMUSCULAR; INTRAVENOUS; SUBCUTANEOUS
Status: DISCONTINUED | OUTPATIENT
Start: 2019-02-02 | End: 2019-02-04

## 2019-02-02 RX ORDER — OXYCODONE HYDROCHLORIDE 5 MG/1
5 TABLET ORAL
Status: CANCELLED | OUTPATIENT
Start: 2019-02-02

## 2019-02-02 RX ORDER — ONDANSETRON 2 MG/ML
4 INJECTION INTRAMUSCULAR; INTRAVENOUS EVERY 4 HOURS PRN
Status: CANCELLED | OUTPATIENT
Start: 2019-02-02

## 2019-02-02 RX ORDER — OXYCODONE HYDROCHLORIDE 5 MG/1
5 TABLET ORAL
Status: DISCONTINUED | OUTPATIENT
Start: 2019-02-02 | End: 2019-02-04

## 2019-02-02 RX ORDER — BISACODYL 10 MG
10 SUPPOSITORY, RECTAL RECTAL
Status: DISCONTINUED | OUTPATIENT
Start: 2019-02-02 | End: 2019-02-09 | Stop reason: HOSPADM

## 2019-02-02 RX ORDER — ACETAMINOPHEN 325 MG/1
650 TABLET ORAL EVERY 6 HOURS PRN
Status: DISCONTINUED | OUTPATIENT
Start: 2019-02-02 | End: 2019-02-04

## 2019-02-02 RX ORDER — LISINOPRIL 5 MG/1
5 TABLET ORAL DAILY
Status: CANCELLED | OUTPATIENT
Start: 2019-02-03

## 2019-02-02 RX ORDER — ONDANSETRON 4 MG/1
4 TABLET, ORALLY DISINTEGRATING ORAL EVERY 4 HOURS PRN
Status: CANCELLED | OUTPATIENT
Start: 2019-02-02

## 2019-02-02 RX ORDER — SODIUM CHLORIDE 9 MG/ML
INJECTION, SOLUTION INTRAVENOUS CONTINUOUS
Status: CANCELLED | OUTPATIENT
Start: 2019-02-02

## 2019-02-02 RX ORDER — OXYCODONE HYDROCHLORIDE 5 MG/1
2.5 TABLET ORAL
Status: CANCELLED | OUTPATIENT
Start: 2019-02-02

## 2019-02-02 RX ORDER — HYDROMORPHONE HYDROCHLORIDE 1 MG/ML
0.25 INJECTION, SOLUTION INTRAMUSCULAR; INTRAVENOUS; SUBCUTANEOUS
Status: CANCELLED | OUTPATIENT
Start: 2019-02-02

## 2019-02-02 RX ORDER — POLYETHYLENE GLYCOL 3350 17 G/17G
1 POWDER, FOR SOLUTION ORAL DAILY
Status: DISCONTINUED | OUTPATIENT
Start: 2019-02-03 | End: 2019-02-09 | Stop reason: HOSPADM

## 2019-02-02 RX ORDER — SODIUM CHLORIDE 9 MG/ML
INJECTION, SOLUTION INTRAVENOUS CONTINUOUS
Status: DISCONTINUED | OUTPATIENT
Start: 2019-02-02 | End: 2019-02-04

## 2019-02-02 RX ORDER — ONDANSETRON 4 MG/1
4 TABLET, ORALLY DISINTEGRATING ORAL EVERY 4 HOURS PRN
Status: DISCONTINUED | OUTPATIENT
Start: 2019-02-02 | End: 2019-02-04

## 2019-02-02 RX ADMIN — STANDARDIZED SENNA CONCENTRATE AND DOCUSATE SODIUM 2 TABLET: 8.6; 5 TABLET, FILM COATED ORAL at 05:08

## 2019-02-02 RX ADMIN — LISINOPRIL 5 MG: 5 TABLET ORAL at 05:09

## 2019-02-02 RX ADMIN — METRONIDAZOLE 500 MG: 500 INJECTION, SOLUTION INTRAVENOUS at 05:13

## 2019-02-02 RX ADMIN — BISACODYL 10 MG: 10 SUPPOSITORY RECTAL at 07:33

## 2019-02-02 RX ADMIN — STANDARDIZED SENNA CONCENTRATE AND DOCUSATE SODIUM 2 TABLET: 8.6; 5 TABLET, FILM COATED ORAL at 16:57

## 2019-02-02 RX ADMIN — OXYCODONE HYDROCHLORIDE 5 MG: 5 TABLET ORAL at 12:53

## 2019-02-02 RX ADMIN — METRONIDAZOLE 500 MG: 500 INJECTION, SOLUTION INTRAVENOUS at 21:35

## 2019-02-02 RX ADMIN — LEVOTHYROXINE SODIUM 100 MCG: 100 TABLET ORAL at 05:08

## 2019-02-02 RX ADMIN — SODIUM CHLORIDE: 9 INJECTION, SOLUTION INTRAVENOUS at 18:41

## 2019-02-02 RX ADMIN — CEFTRIAXONE SODIUM 2 G: 2 INJECTION, POWDER, FOR SOLUTION INTRAMUSCULAR; INTRAVENOUS at 05:10

## 2019-02-02 RX ADMIN — METRONIDAZOLE 500 MG: 500 INJECTION, SOLUTION INTRAVENOUS at 12:58

## 2019-02-02 RX ADMIN — OXYCODONE HYDROCHLORIDE 5 MG: 5 TABLET ORAL at 16:57

## 2019-02-02 RX ADMIN — OXYCODONE HYDROCHLORIDE 5 MG: 5 TABLET ORAL at 05:09

## 2019-02-02 RX ADMIN — ASPIRIN 81 MG: 81 TABLET, COATED ORAL at 05:09

## 2019-02-02 RX ADMIN — POLYETHYLENE GLYCOL 3350 1 PACKET: 17 POWDER, FOR SOLUTION ORAL at 05:14

## 2019-02-02 RX ADMIN — OXYCODONE HYDROCHLORIDE 5 MG: 5 TABLET ORAL at 01:44

## 2019-02-02 RX ADMIN — OXYCODONE HYDROCHLORIDE 5 MG: 5 TABLET ORAL at 21:35

## 2019-02-02 ASSESSMENT — PATIENT HEALTH QUESTIONNAIRE - PHQ9
9. THOUGHTS THAT YOU WOULD BE BETTER OFF DEAD, OR OF HURTING YOURSELF: NOT AT ALL
7. TROUBLE CONCENTRATING ON THINGS, SUCH AS READING THE NEWSPAPER OR WATCHING TELEVISION: NOT AT ALL
4. FEELING TIRED OR HAVING LITTLE ENERGY: NEARLY EVERY DAY
1. LITTLE INTEREST OR PLEASURE IN DOING THINGS: NEARLY EVERY DAY
2. FEELING DOWN, DEPRESSED, IRRITABLE, OR HOPELESS: NOT AT ALL
SUM OF ALL RESPONSES TO PHQ QUESTIONS 1-9: 9
3. TROUBLE FALLING OR STAYING ASLEEP OR SLEEPING TOO MUCH: NEARLY EVERY DAY
SUM OF ALL RESPONSES TO PHQ9 QUESTIONS 1 AND 2: 3
6. FEELING BAD ABOUT YOURSELF - OR THAT YOU ARE A FAILURE OR HAVE LET YOURSELF OR YOUR FAMILY DOWN: NOT AL ALL
8. MOVING OR SPEAKING SO SLOWLY THAT OTHER PEOPLE COULD HAVE NOTICED. OR THE OPPOSITE, BEING SO FIGETY OR RESTLESS THAT YOU HAVE BEEN MOVING AROUND A LOT MORE THAN USUAL: NOT AT ALL
5. POOR APPETITE OR OVEREATING: NOT AT ALL

## 2019-02-02 ASSESSMENT — COGNITIVE AND FUNCTIONAL STATUS - GENERAL
CLIMB 3 TO 5 STEPS WITH RAILING: A LOT
DRESSING REGULAR LOWER BODY CLOTHING: A LITTLE
MOVING TO AND FROM BED TO CHAIR: A LITTLE
SUGGESTED CMS G CODE MODIFIER DAILY ACTIVITY: CK
PERSONAL GROOMING: A LITTLE
WALKING IN HOSPITAL ROOM: A LITTLE
DAILY ACTIVITIY SCORE: 17
MOVING FROM LYING ON BACK TO SITTING ON SIDE OF FLAT BED: A LITTLE
TOILETING: A LITTLE
DRESSING REGULAR UPPER BODY CLOTHING: A LITTLE
MOBILITY SCORE: 17
HELP NEEDED FOR BATHING: A LITTLE
TURNING FROM BACK TO SIDE WHILE IN FLAT BAD: A LITTLE
EATING MEALS: A LOT
STANDING UP FROM CHAIR USING ARMS: A LITTLE
SUGGESTED CMS G CODE MODIFIER MOBILITY: CK

## 2019-02-02 ASSESSMENT — LIFESTYLE VARIABLES
TOTAL SCORE: 0
EVER HAD A DRINK FIRST THING IN THE MORNING TO STEADY YOUR NERVES TO GET RID OF A HANGOVER: NO
HOW MANY TIMES IN THE PAST YEAR HAVE YOU HAD 5 OR MORE DRINKS IN A DAY: 0
ALCOHOL_USE: NO
TOTAL SCORE: 0
EVER_SMOKED: YES
AVERAGE NUMBER OF DAYS PER WEEK YOU HAVE A DRINK CONTAINING ALCOHOL: 7
CONSUMPTION TOTAL: NEGATIVE
TOTAL SCORE: 0
ON A TYPICAL DAY WHEN YOU DRINK ALCOHOL HOW MANY DRINKS DO YOU HAVE: 1
EVER FELT BAD OR GUILTY ABOUT YOUR DRINKING: NO
ALCOHOL_USE: YES
HAVE YOU EVER FELT YOU SHOULD CUT DOWN ON YOUR DRINKING: NO
HAVE PEOPLE ANNOYED YOU BY CRITICIZING YOUR DRINKING: NO

## 2019-02-02 ASSESSMENT — ENCOUNTER SYMPTOMS
SHORTNESS OF BREATH: 1
HEADACHES: 0
DEPRESSION: 0
SPUTUM PRODUCTION: 0
NERVOUS/ANXIOUS: 1
CHILLS: 0
WEAKNESS: 0
COUGH: 0
NAUSEA: 1
FEVER: 0
ABDOMINAL PAIN: 1
PALPITATIONS: 0
DIZZINESS: 0
SORE THROAT: 0
WEIGHT LOSS: 1
VOMITING: 0
SINUS PAIN: 0

## 2019-02-02 ASSESSMENT — COPD QUESTIONNAIRES
IN THE PAST 12 MONTHS DO YOU DO LESS THAN YOU USED TO BECAUSE OF YOUR BREATHING PROBLEMS: DISAGREE/UNSURE
DURING THE PAST 4 WEEKS HOW MUCH DID YOU FEEL SHORT OF BREATH: NONE/LITTLE OF THE TIME
HAVE YOU SMOKED AT LEAST 100 CIGARETTES IN YOUR ENTIRE LIFE: YES
COPD SCREENING SCORE: 4
DO YOU EVER COUGH UP ANY MUCUS OR PHLEGM?: NO/ONLY WITH OCCASIONAL COLDS OR INFECTIONS

## 2019-02-02 NOTE — ASSESSMENT & PLAN NOTE
2/4 Underwent open cholecystectomy for gangrenous cholecystitis, drainage of liver abscess, and drainage of peritoneal abscess   Improvement in pain   Day 5 antibiotics of 7-10 per ID. Ok to switch to PO cipro and flagyl on discharge   repeat CT abdomen/pelvis in the next 2-3 weeks (around feb 21)  Cont antibiotics

## 2019-02-02 NOTE — CARE PLAN
Problem: Safety  Goal: Will remain free from injury    Intervention: Provide assistance with mobility  Bed in low position, traded socks on, call light within reach. Pt instructed to call nurse for any further needs. Bed alarm in place        Problem: Knowledge Deficit  Goal: Knowledge of disease process/condition, treatment plan, diagnostic tests, and medications will improve    Intervention: Assess knowledge level of disease process/condition, treatment plan, diagnostic tests, and medications  POC discussed with pt, pt verbalized understanding.        Problem: Pain Management  Goal: Pain level will decrease to patient's comfort goal    Intervention: Follow pain managment plan developed in collaboration with patient and Interdisciplinary Team  Pain assessed throughout shift, medicated as needed per MD order, see MAR.

## 2019-02-02 NOTE — DISCHARGE PLANNING
Pt will transfer to [facility] T419 .    Pt signed the COBRA form and [facility]

## 2019-02-02 NOTE — CONSULTS
DATE OF SERVICE:  02/02/2019    REQUESTING PHYSICIANS:  Shauna Salguero MD as well as Haroon Moore MD    HISTORY OF PRESENT ILLNESS:  Patient is a 73-year-old female patient, who is   known to me, who was seen in the office approximately 2 weeks earlier.  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  On CT scan that I   evaluated when she was first referred to me, patient was found to have a   stricture right above the pancreatic head, obvious with extreme dilation of   the extrahepatic ducts and intrahepatic ducts.  In any event, patient was   doing well.  She was sent for referral by Dr. Moore for [facility] to evaluate   this stricture to make sure whether this looked more malignant versus benign.    Dr. Moore felt as though it looked more like a shelf and it was benign   appearing and did not look malignant stricture, he did place a metal stent   Wallstent in and the patient went home.  Several days later, she developed   severe right upper quadrant pain and so, she came to the emergency room.    Ultrasound of the abdomen was performed, which showed that there was dilation   of the gallbladder.  The patient also had a HIDA scan done yesterday, which   was negative, meaning no filling of the gallbladder consistent with possible   cholecystitis, but the patient was unable to continue due to discomfort.    There was excretion into the common duct and small bowel, but there was no   uptake in the gallbladder.  Problem is the patient does have a walled metal   stent in that location.  In any event, the patient was admitted to the   hospital and started on antibiotics.  She states that today she is feeling   better, but the patient does appear that her bilirubin has slowly increased.    She had her stent placed on 28th and presently, her  bilirubin has gone up from   2.8 to 3-3.9 and there maybe either kink in the stent and/or obstructing of   the stent due to the stricture.  LFTs are also elevated.  She has minimal   right upper quadrant pain at this time.  No nausea or vomiting.  She is   tolerating clear liquids without any difficulty.  White count today is 17, is   up, and so the patient may be developing cholangitis.  In any event, the   patient is presently on Flagyl and Rocephin.  She is taking Ecotrin daily and   Synthroid.  She is on stool softeners as well.  She is on normal saline drip,   p.o. Tylenol, and p.o. nausea medicine.  In any event, she is on clear liquid   diet.    PAST SURGICAL HISTORY:  As stated above, the patient has undergone bile duct   surgery when she was 8 years old and it appears that they most likely did a   primary reanastomosis causing the stricture.  There is no sign of a malignant   tumor on CT scan, but there is questionable due to her age.  She also had   appendectomy in 1952, laparotomy in 1953, gastroscopy in 2019.  She had an EGD   with biopsies and SpyGlass throughout 01/2019 and an ERCP to place a walled   metal stent on 01/28/2019.    FAMILY HISTORY:  Patient has family history of cancer present, but is unclear   which kind of cancer.    SOCIAL HISTORY:  Stopped smoking 19 years ago, but does not smoke tobacco.    She does drink a glass of wine, which she is not jaundiced.    ALLERGIES:  She has no known drug allergies.    MEDICATIONS:  At home, she was taking vitamin D3, B12.  She was on Augmentin   following her stent placement.  She is on Lipitor, Synthroid, Prinivil, and   Ultram for pain.    PHYSICAL EXAMINATION:  VITAL SIGNS:  Show that she is presently afebrile with pulse in the 70s, blood   pressure is stable, 110/48.  HEENT:  Extraocular movements are intact.  She does appear jaundiced.  NECK:  Soft and supple.  There is no cervical, supraclavicular, or occipital   lymphadenopathy.  LUNGS:  Clear  to auscultation, good inspiratory effort.  ABDOMEN:  Soft, slight discomfort in the right upper quadrant, difficult to   assess whether this is cholecystitis related or related to the stent and/or   cholangitis or biliary obstruction.  No peritoneal signs, rebound or guarding.  EXTREMITIES:  No clubbing, cyanosis, or edema.  PELVIC:  Exam is deferred.  NEUROLOGIC:  She is grossly intact, all XII cranial nerves as well as motor   nerves.    ASSESSMENT AND PLAN:  Based on the findings, due to the fact that this is a   fairly complicated biliary problem, it is unclear whether the patient has a   biliary tumor versus cholecystitis or underlying malignancy.  She had a stent   placed and low suspicion on scope, but the problem is if this is external   compressing mass, this might not be seen on SpyGlass.  My recommendation is   the patient be transferred to [facility] due to the fact that we have access   to the equipment including choledochoscopy, intraoperative ultrasound, and if   she needed a Cecily-en-Y hepaticojejunostomy and open cholecystectomy that we   could proceed without any difficulty with our team.  The patient understood   this and the daughter also agreed with the move.  We have discussed the case   with Dr. Salguero.  She will order the transfer and the patient has been   tentatively scheduled Monday to follow my other cases.       ____________________________________     MD MONTANA Tapia / LAVELLE    DD:  02/02/2019 09:57:17  DT:  02/02/2019 12:03:47    D#:  [id number]  Job#:  [id number]

## 2019-02-02 NOTE — PROGRESS NOTES
[facility] Medicine Daily Progress Note    Date of Service  2/1/2019    Chief Complaint  Right upper quadrant pain    Hospital Course    *73-year-old female who had biliary surgery at the age of 8, was seen at [facility] hospital for worsening right upper quadrant pain.  Was sent here and on 1/28 had ERCP with up stream dilation and placement of a covered metal stent, however had worsening pain and was subsequently admitted due to concern for possible cholecystitis/cholangitis*      Interval Problem Update  Patient anxious-seen between images for her HIDA exam  Complaining of back and mild epigastric pain.  Daughter at bedside  Surgical consultation anticipated this evening.    Consultants/Specialty  GI consultants  Pending surgical consultation- Dr Cannon    Code Status  Full    Disposition  TBD    Review of Systems  Review of Systems   Constitutional: Negative for chills and fever.   HENT: Negative for congestion and sore throat.    Eyes: Negative for discharge and redness.   Respiratory: Negative for cough and shortness of breath.    Cardiovascular: Negative for chest pain.   Gastrointestinal: Positive for abdominal pain. Negative for nausea and vomiting.   Genitourinary: Negative for dysuria.   Musculoskeletal: Positive for back pain.   Skin: Negative for rash.   Neurological: Negative for dizziness and headaches.   Psychiatric/Behavioral: The patient is nervous/anxious.         Physical Exam  Temp:  [36.6 °C (97.8 °F)-37.7 °C (99.8 °F)] 37.7 °C (99.8 °F)  Pulse:  [63-93] 65  Resp:  [16-19] 18  BP: (121-146)/(53-66) 133/58  SpO2:  [95 %-98 %] 95 %    Physical Exam   Constitutional: She is oriented to person, place, and time. She appears well-developed and well-nourished. No distress.   HENT:   Head: Normocephalic and atraumatic.   Right Ear: External ear normal.   Left Ear: External ear normal.   Mouth/Throat: Oropharynx is clear and moist.   Eyes: Pupils are equal, round, and reactive to light. Conjunctivae  and EOM are normal. Right eye exhibits no discharge. Left eye exhibits no discharge. Scleral icterus is present.   Neck: Neck supple.   Cardiovascular: Normal rate and regular rhythm.    Pulmonary/Chest: Effort normal and breath sounds normal.   Abdominal: Soft. She exhibits no distension. There is tenderness (Mild epigastric).   Decreased bowel sounds   Musculoskeletal: She exhibits no edema or tenderness.   Neurological: She is alert and oriented to person, place, and time.   Skin: Skin is warm and dry. She is not diaphoretic.   Psychiatric:   Anxious   Nursing note and vitals reviewed.      Fluids  No intake or output data in the 24 hours ending 02/01/19 2013    Laboratory  Recent Labs      01/31/19 1935 02/01/19 0524   WBC  16.0*  17.0*   RBC  4.53  4.22   HEMOGLOBIN  13.8  13.0   HEMATOCRIT  40.2  37.8   MCV  88.7  89.6   MCH  30.5  30.8   MCHC  34.3  34.4   RDW  39.6  40.6   PLATELETCT  182  180   MPV  10.2  10.6     Recent Labs      01/31/19 1935 02/01/19   0524   SODIUM  127*  128*   POTASSIUM  3.7  3.8   CHLORIDE  97  100   CO2  22  21   GLUCOSE  107*  95   BUN  8  7*   CREATININE  0.69  0.68   CALCIUM  8.3*  7.9*     Recent Labs      02/01/19   0524   INR  1.20*               Imaging  NM-HEPATOBILIARY SCAN   Final Result      Nonvisualization of the gallbladder suggestive of cystic duct obstruction which can be seen in acute cholecystitis.              Assessment/Plan  * Cholangitis   Assessment & Plan    Noted on CT imaging from outlying facility.with biliary obstruction, gallbladder distention.   HIDA has nonvisualization of the gallbladder consistent with cystic duct obstruction  Await hepatobiliary surgical consultation this evening         Transaminitis   Assessment & Plan    Overall unchanged-plan as noted     Hyponatremia   Assessment & Plan    2/2 to dehydration  Worse following admission, continue to monitor     Biliary obstruction   Assessment & Plan    Prior history of biliary stenosis  and stent  Now with nonvisualization of gallbladder  Pending surgical consultation     Hypothyroid- (present on admission)   Assessment & Plan    Continue levothyroxine     Hypertension- (present on admission)   Assessment & Plan    Controlled on lisinopril          VTE prophylaxis: SCDs  Medical contraindicated due to biliary manipulations

## 2019-02-02 NOTE — CARE PLAN
Problem: Safety  Goal: Will remain free from injury  Outcome: PROGRESSING AS EXPECTED    Intervention: Provide assistance with mobility   02/02/19 0349   OTHER   Assistance / Tolerance No assistance required;Tolerates Well         Problem: Pain Management  Goal: Pain level will decrease to patient's comfort goal  Outcome: PROGRESSING AS EXPECTED   02/02/19 0349   OTHER   Non Verbal Scale  Calm;Sleeping;Unlabored Breathing

## 2019-02-02 NOTE — PROGRESS NOTES
Received report from Chantal MCNEILL. Assumed care. This pt is AOx4, reports pain, will medicate per MAR. Patient and RN discussed plan of care including pain management, general surgical consult: questions answered. Chart reviewed. Call light in place, fall precautions in place, patient educated on importance of calling for assistance. No additional needs at this time.

## 2019-02-02 NOTE — PROGRESS NOTES
Report received from Day RN. Pt states pain is 3/10. Oxycodone given per MAR. Family at bedside. Pt states further  no needs at this time.

## 2019-02-02 NOTE — DISCHARGE SUMMARY
Discharge Summary    CHIEF COMPLAINT ON ADMISSION  Abdominal pain    Reason for Admission  Hyperbilirubinemia     CODE STATUS  Full Code    HPI & HOSPITAL COURSE    *73-year-old female who had biliary surgery at the age of 8, was seen at [facility] for worsening right upper quadrant pain.  Was sent here and on 1/28 had ERCP with up stream dilation and placement of a covered metal stent, however had worsening pain and was subsequently admitted due to concern for possible cholecystitis/cholangitis*      HIDA scan failed to visualize gallbladder - Bili higher today. She ahs been evaluated by hepatobiliary surgeon and will require surgical exploration and intervention. The OR crew experienced in this type of surgery is not available at this facility so she will be transferred to [facility] for higher level of care.    Therefore, she is discharged in good and stable condition to tertiary care center/ [facility]    The patient met 2-midnight criteria for an inpatient stay at the time of discharge.      DISCHARGE DIAGNOSES  Principal Problem:    Cholangitis POA: Unknown  Active Problems:    Hypertension POA: Yes    Hypothyroid POA: Yes    Biliary obstruction POA: Unknown    Hyponatremia POA: Unknown    Transaminitis POA: Unknown  Resolved Problems:    * No resolved hospital problems. *      FOLLOW UP  Xfer to facility    MEDICATIONS ON DISCHARGE    Current Facility-Administered Medications:   •  metroNIDAZOLE (FLAGYL) IVPB 500 mg, 500 mg, Intravenous, Q8HRS, Pablo Cannon M.D., Stopped at 02/02/19 0613  •  cefTRIAXone (ROCEPHIN) 2 g in  mL IVPB, 2 g, Intravenous, Q24HRS, Shauna Salguero M.D., Stopped at 02/02/19 0540  •  NS infusion, , Intravenous, Continuous, Mela Srivastava M.D., Last Rate: 100 mL/hr at 01/31/19 7020  •  ondansetron (ZOFRAN) syringe/vial injection 4 mg, 4 mg, Intravenous, Q4HRS PRN, Mela Srivastava M.D.  •  ondansetron (ZOFRAN ODT) dispertab 4 mg, 4 mg,  Oral, Q4HRS PRN, Mela Srivastava M.D.  •  acetaminophen (TYLENOL) tablet 650 mg, 650 mg, Oral, Q6HRS PRN, Mela Srivastava M.D.  •  Notify provider if pain remains uncontrolled, , , CONTINUOUS **AND** Use the numeric rating scale (NRS-11) on regular floors and Critical-Care Pain Observation Tool (CPOT) on ICUs/Trauma to assess pain, , , CONTINUOUS **AND** Pulse Ox (Oximetry), , , CONTINUOUS **AND** Pharmacy Consult Request ...Pain Management Review 1 Each, 1 Each, Other, PHARMACY TO DOSE **AND** If patient difficult to arouse and/or has respiratory depression, stop any opiates that are currently infusing and call a Rapid Response., , , CONTINUOUS **AND** oxyCODONE immediate-release (ROXICODONE) tablet 2.5 mg, 2.5 mg, Oral, Q3HRS PRN **AND** oxyCODONE immediate-release (ROXICODONE) tablet 5 mg, 5 mg, Oral, Q3HRS PRN, 5 mg at 02/02/19 0509 **AND** HYDROmorphone pf (DILAUDID) injection 0.25 mg, 0.25 mg, Intravenous, Q3HRS PRN, Mela Srivastava M.D.  •  levothyroxine (SYNTHROID) tablet 100 mcg, 100 mcg, Oral, DAILY, Mela Srivastava M.D., 100 mcg at 02/02/19 0508  •  lisinopril (PRINIVIL) tablet 5 mg, 5 mg, Oral, DAILY, Mela Srivastava M.D., 5 mg at 02/02/19 0509  •  senna-docusate (PERICOLACE or SENOKOT S) 8.6-50 MG per tablet 2 Tab, 2 Tab, Oral, BID, 2 Tab at 02/02/19 0508 **AND** polyethylene glycol/lytes (MIRALAX) PACKET 1 Packet, 1 Packet, Oral, DAILY, 1 Packet at 02/02/19 0514 **AND** magnesium hydroxide (MILK OF MAGNESIA) suspension 30 mL, 30 mL, Oral, QDAY PRN **AND** bisacodyl (DULCOLAX) suppository 10 mg, 10 mg, Rectal, QDAY PRN, Peter Lo M.D., 10 mg at 02/02/19 0733  Allergies  No Known Allergies    DIET  Orders Placed This Encounter   Procedures   • Diet Order Clear Liquids - No Red Foods     Standing Status:   Standing     Number of Occurrences:   1     Order Specific Question:   Diet:     Answer:   Clear Liquids - No Red Foods [12]       ACTIVITY  As tolerated    LINES, DRAINS, AND  WOUNDS  This is an automated list. Peripheral IVs will be removed prior to discharge.  Peripheral IV 02/01/19 20 G Right Hand (Active)   Site Assessment Dry;Clean;Intact 2/1/2019  9:00 PM   Dressing Type Transparent 2/1/2019  9:00 PM   Line Status Blood return noted 2/1/2019  9:00 PM   Dressing Status Clean;Dry;Intact 2/1/2019  9:00 PM   Dressing Intervention N/A 2/1/2019  9:00 PM   Date Primary Tubing Changed 02/01/19 2/1/2019  9:00 PM   Date Secondary Tubing Changed 02/02/19 2/2/2019  2:00 AM   NEXT Primary Tubing Change  02/05/19 2/1/2019  9:00 PM   NEXT Secondary Tubing Change  02/03/19 2/2/2019  2:00 AM   Infiltration Grading ([facility]) 0 2/2/2019  2:00 AM   Phlebitis Scale ([facility] Only) 0 2/2/2019  2:00 AM          Peripheral IV 02/01/19 20 G Right Hand (Active)   Site Assessment Dry;Clean;Intact 2/1/2019  9:00 PM   Dressing Type Transparent 2/1/2019  9:00 PM   Line Status Blood return noted 2/1/2019  9:00 PM   Dressing Status Clean;Dry;Intact 2/1/2019  9:00 PM   Dressing Intervention N/A 2/1/2019  9:00 PM   Date Primary Tubing Changed 02/01/19 2/1/2019  9:00 PM   Date Secondary Tubing Changed 02/02/19 2/2/2019  2:00 AM   NEXT Primary Tubing Change  02/05/19 2/1/2019  9:00 PM   NEXT Secondary Tubing Change  02/03/19 2/2/2019  2:00 AM   Infiltration Grading ([facility]) 0 2/2/2019  2:00 AM   Phlebitis Scale ([facility] Only) 0 2/2/2019  2:00 AM               MENTAL STATUS ON TRANSFER  A&O X4          CONSULTATIONS  GI consulants ([place])  Dr Cannon- Surgery    PROCEDURES  none    LABORATORY  Lab Results   Component Value Date    SODIUM 128 (L) 02/02/2019    POTASSIUM 3.6 02/02/2019    CHLORIDE 100 02/02/2019    CO2 20 02/02/2019    GLUCOSE 85 02/02/2019    BUN 6 (L) 02/02/2019    CREATININE 0.83 02/02/2019        Lab Results   Component Value Date    WBC 17.0 (H) 02/01/2019    HEMOGLOBIN 13.0 02/01/2019    HEMATOCRIT 37.8 02/01/2019    PLATELETCT 180 02/01/2019        Total time of the discharge  process exceeds 38 minutes.

## 2019-02-02 NOTE — PROGRESS NOTES
Patient is a direct admit from [facility].   Dr Salguero is accepting the patient.   Dr Cannon is consulting for surgery.   Orders signed and held, need to be released when the patient arrives on the unit.

## 2019-02-02 NOTE — PROGRESS NOTES
Report given to Day RN. Pt encouraged to take Dulcolax suppository to help with BM. Pt given suppository. Daughter at bedside.

## 2019-02-02 NOTE — H&P
Hospital Medicine History & Physical Note    Date of Service  2/2/2019    Primary Care Physician  Damien Phillips M.D.    Consultants  GI consultants  Dr Cannon    Code Status  Full    Chief Complaint  Abdominal pain.    History of Presenting Illness  73-year-old female who had biliary surgery at the age of 8, but has continued to have issues with biliary obstruction. On 1/28 had ERCP with up stream dilation and placement of a covered metal stent, however had worsening pain and was subsequently admitted due to concern for possible cholecystitis/cholangitis, she was transferred to [facility] for further intervention options, her LFT's remain high particularly her bilirubin, she still has pain and HIDA scan done showed no uptake in the GB. She was seen by Surgeon who thinks she will need surgical exploration and intervention, the OR team experienced in this type of surgery was unavailable at [facility] and patient was agreeable to transfer to [facility] for Surgery - tentatively on Monday.    Review of Systems  Review of Systems   Constitutional: Positive for malaise/fatigue (mild) and weight loss. Negative for chills and fever.   HENT: Negative for sinus pain and sore throat.    Respiratory: Positive for shortness of breath (2/2 pain). Negative for cough and sputum production.    Cardiovascular: Negative for chest pain and palpitations.   Gastrointestinal: Positive for abdominal pain and nausea. Negative for vomiting.   Genitourinary: Negative for dysuria.   Musculoskeletal: Negative for joint pain.   Skin: Negative for itching and rash.   Neurological: Negative for dizziness, weakness and headaches.   Psychiatric/Behavioral: Negative for depression. The patient is nervous/anxious.        Past Medical History   has a past medical history of Arrhythmia; Bowel habit changes (01/2019); Cataract; High cholesterol; Hypertension; Hypothyroid; Jaundice; and Stroke (HCC) (07/2018).    Surgical History   has  a past surgical history that includes ercp (12/13/2018); appendectomy (1952); laparotomy (1953); gastroscopy (N/A, 1/7/2019); egd with asp/bx (N/A, 1/7/2019); ercp (1/28/2019); ercp-diagnostic w/biopsy (1/28/2019); and ercp w/ insertion stent/tube (1/28/2019).     Family History  family history includes Cancer in her mother.     Social History   reports that she quit smoking about 19 years ago. She has never used smokeless tobacco. She reports that she drinks alcohol. She reports that she does not use drugs.    Allergies  No Known Allergies    Medications  Cannot display prior to admission medications because the patient has not been admitted in this contact.       Physical Exam  BP: ()/()     Physical Exam   Constitutional: She is oriented to person, place, and time. She appears well-developed and well-nourished. No distress.   HENT:   Head: Normocephalic and atraumatic.   Right Ear: External ear normal.   Left Ear: External ear normal.   Nose: Nose normal.   Mouth/Throat: Oropharynx is clear and moist.   Eyes: Pupils are equal, round, and reactive to light. Conjunctivae and EOM are normal. Right eye exhibits no discharge. Left eye exhibits no discharge. Scleral icterus is present.   Neck: Neck supple.   Cardiovascular: Normal rate and regular rhythm.    Pulmonary/Chest: No respiratory distress. She has rales.   Splinted effort 2/2 pain  Transient crackles/ ? Atelectasis R base   Abdominal: Soft. Bowel sounds are normal. She exhibits no distension and no mass. There is tenderness (Epig to RUQ). There is no rebound and no guarding.   Musculoskeletal: She exhibits no edema or tenderness.   Neurological: She is alert and oriented to person, place, and time. No cranial nerve deficit.   Skin: Skin is warm and dry. She is not diaphoretic.   Psychiatric: She has a normal mood and affect.   Nursing note and vitals reviewed.      Laboratory:  Recent Labs      01/31/19   1935 02/01/19   0524   WBC  16.0*  17.0*   RBC  4.53   4.22   HEMOGLOBIN  13.8  13.0   HEMATOCRIT  40.2  37.8   MCV  88.7  89.6   MCH  30.5  30.8   MCHC  34.3  34.4   RDW  39.6  40.6   PLATELETCT  182  180   MPV  10.2  10.6     Recent Labs      01/31/19 1935 02/01/19 0524  02/02/19   0455   SODIUM  127*  128*  128*   POTASSIUM  3.7  3.8  3.6   CHLORIDE  97  100  100   CO2  22  21  20   GLUCOSE  107*  95  85   BUN  8  7*  6*   CREATININE  0.69  0.68  0.83   CALCIUM  8.3*  7.9*  7.8*     Recent Labs      01/31/19 1935 02/01/19 0524  02/02/19   0455   ALTSGPT  322*  262*  151*   ASTSGOT  134*  104*  44   ALKPHOSPHAT  225*  233*  214*   TBILIRUBIN  2.8*  3.0*  3.9*   GLUCOSE  107*  95  85     Recent Labs      02/01/19   0524   INR  1.20*             No results for input(s): TROPONINI in the last 72 hours.    Urinalysis:    No results found     Imaging:    Nonvisualization of the gallbladder suggestive of cystic duct obstruction which can be seen in acute cholecystitis.      Assessment/Plan:  I anticipate this patient will require at least two midnights for appropriate medical management, necessitating inpatient admission.    Transaminitis- (present on admission)   Assessment & Plan    Improved some     Hyponatremia- (present on admission)   Assessment & Plan    Mild, stable     Obstructive jaundice- (present on admission)   Assessment & Plan    Unclear cause  Did not resolve w/ stenting  Plan for surgical exploration/ interventions     Hypothyroid- (present on admission)   Assessment & Plan    Continue levothyroxine         VTE prophylaxis: SCD's

## 2019-02-03 PROBLEM — K83.1 BILIARY STRICTURE: Status: ACTIVE | Noted: 2019-02-03

## 2019-02-03 PROBLEM — K81.9 CHOLECYSTITIS: Status: ACTIVE | Noted: 2019-02-03

## 2019-02-03 PROBLEM — E78.5 DYSLIPIDEMIA: Status: ACTIVE | Noted: 2019-02-03

## 2019-02-03 LAB
ALBUMIN SERPL BCP-MCNC: 2.6 G/DL (ref 3.2–4.9)
ALBUMIN/GLOB SERPL: 1.2 G/DL
ALP SERPL-CCNC: 225 U/L (ref 30–99)
ALT SERPL-CCNC: 80 U/L (ref 2–50)
ANION GAP SERPL CALC-SCNC: 5 MMOL/L (ref 0–11.9)
AST SERPL-CCNC: 21 U/L (ref 12–45)
BILIRUB SERPL-MCNC: 2.5 MG/DL (ref 0.1–1.5)
BUN SERPL-MCNC: 5 MG/DL (ref 8–22)
CALCIUM SERPL-MCNC: 7.6 MG/DL (ref 8.5–10.5)
CHLORIDE SERPL-SCNC: 102 MMOL/L (ref 96–112)
CO2 SERPL-SCNC: 24 MMOL/L (ref 20–33)
CREAT SERPL-MCNC: 0.52 MG/DL (ref 0.5–1.4)
GLOBULIN SER CALC-MCNC: 2.1 G/DL (ref 1.9–3.5)
GLUCOSE SERPL-MCNC: 101 MG/DL (ref 65–99)
POTASSIUM SERPL-SCNC: 3.3 MMOL/L (ref 3.6–5.5)
PROT SERPL-MCNC: 4.7 G/DL (ref 6–8.2)
SODIUM SERPL-SCNC: 131 MMOL/L (ref 135–145)

## 2019-02-03 PROCEDURE — 700105 HCHG RX REV CODE 258: Performed by: INTERNAL MEDICINE

## 2019-02-03 PROCEDURE — 770001 HCHG ROOM/CARE - MED/SURG/GYN PRIV*

## 2019-02-03 PROCEDURE — A9270 NON-COVERED ITEM OR SERVICE: HCPCS | Performed by: INTERNAL MEDICINE

## 2019-02-03 PROCEDURE — 700101 HCHG RX REV CODE 250: Performed by: INTERNAL MEDICINE

## 2019-02-03 PROCEDURE — 80053 COMPREHEN METABOLIC PANEL: CPT

## 2019-02-03 PROCEDURE — 36415 COLL VENOUS BLD VENIPUNCTURE: CPT

## 2019-02-03 PROCEDURE — 99233 SBSQ HOSP IP/OBS HIGH 50: CPT | Performed by: INTERNAL MEDICINE

## 2019-02-03 PROCEDURE — 700111 HCHG RX REV CODE 636 W/ 250 OVERRIDE (IP): Performed by: INTERNAL MEDICINE

## 2019-02-03 PROCEDURE — 700102 HCHG RX REV CODE 250 W/ 637 OVERRIDE(OP): Performed by: INTERNAL MEDICINE

## 2019-02-03 RX ADMIN — METRONIDAZOLE 500 MG: 500 INJECTION, SOLUTION INTRAVENOUS at 15:34

## 2019-02-03 RX ADMIN — SODIUM CHLORIDE: 9 INJECTION, SOLUTION INTRAVENOUS at 06:45

## 2019-02-03 RX ADMIN — CEFTRIAXONE SODIUM 2 G: 2 INJECTION, POWDER, FOR SOLUTION INTRAMUSCULAR; INTRAVENOUS at 05:25

## 2019-02-03 RX ADMIN — OXYCODONE HYDROCHLORIDE 5 MG: 5 TABLET ORAL at 11:00

## 2019-02-03 RX ADMIN — SENNOSIDES AND DOCUSATE SODIUM 2 TABLET: 8.6; 5 TABLET ORAL at 19:03

## 2019-02-03 RX ADMIN — ONDANSETRON 4 MG: 2 INJECTION INTRAMUSCULAR; INTRAVENOUS at 07:46

## 2019-02-03 RX ADMIN — METRONIDAZOLE 500 MG: 500 INJECTION, SOLUTION INTRAVENOUS at 06:12

## 2019-02-03 RX ADMIN — SODIUM CHLORIDE: 9 INJECTION, SOLUTION INTRAVENOUS at 15:33

## 2019-02-03 RX ADMIN — LISINOPRIL 5 MG: 5 TABLET ORAL at 05:25

## 2019-02-03 RX ADMIN — OXYCODONE HYDROCHLORIDE 5 MG: 5 TABLET ORAL at 22:09

## 2019-02-03 RX ADMIN — OXYCODONE HYDROCHLORIDE 5 MG: 5 TABLET ORAL at 19:03

## 2019-02-03 RX ADMIN — SENNOSIDES AND DOCUSATE SODIUM 2 TABLET: 8.6; 5 TABLET ORAL at 05:25

## 2019-02-03 RX ADMIN — OXYCODONE HYDROCHLORIDE 5 MG: 5 TABLET ORAL at 15:34

## 2019-02-03 RX ADMIN — POLYETHYLENE GLYCOL 3350 1 PACKET: 17 POWDER, FOR SOLUTION ORAL at 05:25

## 2019-02-03 RX ADMIN — LEVOTHYROXINE SODIUM 100 MCG: 50 TABLET ORAL at 05:25

## 2019-02-03 RX ADMIN — ONDANSETRON 4 MG: 2 INJECTION INTRAMUSCULAR; INTRAVENOUS at 22:13

## 2019-02-03 RX ADMIN — OXYCODONE HYDROCHLORIDE 5 MG: 5 TABLET ORAL at 06:45

## 2019-02-03 RX ADMIN — METRONIDAZOLE 500 MG: 500 INJECTION, SOLUTION INTRAVENOUS at 22:09

## 2019-02-03 ASSESSMENT — COGNITIVE AND FUNCTIONAL STATUS - GENERAL
SUGGESTED CMS G CODE MODIFIER MOBILITY: CH
SUGGESTED CMS G CODE MODIFIER DAILY ACTIVITY: CH
MOBILITY SCORE: 24
DAILY ACTIVITIY SCORE: 24

## 2019-02-03 ASSESSMENT — ENCOUNTER SYMPTOMS
INSOMNIA: 0
DIARRHEA: 0
ABDOMINAL PAIN: 1
FOCAL WEAKNESS: 0
COUGH: 0
PALPITATIONS: 0
NERVOUS/ANXIOUS: 0
SHORTNESS OF BREATH: 0
EYE REDNESS: 0
NAUSEA: 0
VOMITING: 0
SEIZURES: 0
WEAKNESS: 0
WHEEZING: 0
BLOOD IN STOOL: 0
EYE PAIN: 0
CONSTIPATION: 0
LOSS OF CONSCIOUSNESS: 0
TREMORS: 0
MYALGIAS: 0
DIZZINESS: 0
FALLS: 0
FEVER: 0
HEMOPTYSIS: 0
HEADACHES: 0
CHILLS: 0

## 2019-02-03 NOTE — PROGRESS NOTES
GI Progress Note:    Jr Borges  Date & Time note created:    2/3/2019   6:40 AM       Patient ID:   Name:             Clementine Hopper   YOB: 1945  Age:                 73 y.o.  female   MRN:               [id number]                                                             CC: Abd pain    Subjective:   Pain controlled with analgesics, some nausea        Past Medical History:   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   • Stroke (HCC) 07/2018    suspected small stroke per neurologist, has plaque in carotid- no residual- states on statin for this,1/2019 not taking statin r/t muscle spasms       Past Surgical History:  Past Surgical History:   Procedure Laterality Date   • ERCP  1/28/2019    Procedure: ERCP- SPYGLASS, POSSIBLE DILATION;  Surgeon: Haroon Moore M.D.;  Location: [facility];  Service: Gastroenterology   • ERCP-DIAGNOSTIC W/BIOPSY  1/28/2019    Procedure: ERCP-DIAGNOSTIC W/BIOPSY;  Surgeon: Haroon Moore M.D.;  Location: [facility];  Service: Gastroenterology   • ERCP W/ INSERTION STENT/TUBE  1/28/2019    Procedure: ERCP W/ INSERTION STENT/TUBE- POSSIBLE BILIARY STENT/METAL STENT (FULLY COVERED);  Surgeon: Haroon Moore M.D.;  Location: [facility];  Service: Gastroenterology   • GASTROSCOPY N/A 1/7/2019    Procedure: GASTROSCOPY;  Surgeon: Cristofer Brandon M.D.;  Location: [facility];  Service: Gastroenterology   • EGD WITH ASP/BX N/A 1/7/2019    Procedure: EGD WITH ASP/BX;  Surgeon: Cristofer Brandon M.D.;  Location: [facility];  Service: Gastroenterology   • ERCP  12/13/2018    Procedure:  ERCP;  Surgeon: Cristofer Brandon M.D.;  Location: [facility];  Service: Gastroenterology   • LAPAROTOMY  1953    for clogged bile duct   • APPENDECTOMY  1952    [facility]       Hospital Medications:    Current Facility-Administered Medications:   •  NS infusion, , Intravenous, Continuous, Shauna Salguero M.D., Last Rate: 100 mL/hr at 02/02/19 1841  •  ondansetron (ZOFRAN ODT) dispertab 4 mg, 4 mg, Oral, Q4HRS PRN, Shauna Salguero M.D.  •  ondansetron (ZOFRAN) syringe/vial injection 4 mg, 4 mg, Intravenous, Q4HRS PRN, Shauna Salguero M.D.  •  senna-docusate (PERICOLACE or SENOKOT S) 8.6-50 MG per tablet 2 Tab, 2 Tab, Oral, BID, 2 Tab at 02/03/19 0525 **AND** polyethylene glycol/lytes (MIRALAX) PACKET 1 Packet, 1 Packet, Oral, DAILY, 1 Packet at 02/03/19 0525 **AND** magnesium hydroxide (MILK OF MAGNESIA) suspension 30 mL, 30 mL, Oral, QDAY PRN **AND** bisacodyl (DULCOLAX) suppository 10 mg, 10 mg, Rectal, QDAY PRN, Shauna Salguero M.D.  •  acetaminophen (TYLENOL) tablet 650 mg, 650 mg, Oral, Q6HRS PRN, Shauna Salguero M.D.  •  Notify provider if pain remains uncontrolled, , , CONTINUOUS **AND** Use the numeric rating scale (NRS-11) on regular floors and Critical-Care Pain Observation Tool (CPOT) on ICUs/Trauma to assess pain, , , CONTINUOUS **AND** Pulse Ox (Oximetry), , , CONTINUOUS **AND** Pharmacy Consult Request ...Pain Management Review 1 Each, 1 Each, Other, PHARMACY TO DOSE **AND** If patient difficult to arouse and/or has respiratory depression, stop any opiates that are currently infusing and call a Rapid Response., , , CONTINUOUS **AND** oxyCODONE immediate-release (ROXICODONE) tablet 2.5 mg, 2.5 mg, Oral, Q3HRS PRN **AND** oxyCODONE immediate-release (ROXICODONE) tablet 5 mg, 5 mg, Oral, Q3HRS PRN, 5 mg at 02/02/19 2135 **AND** HYDROmorphone pf (DILAUDID) injection 0.25 mg, 0.25 mg, Intravenous, Q3HRS PRN, Shauna Salguero M.D.  •  cefTRIAXone (ROCEPHIN) 2 g in   mL IVPB, 2 g, Intravenous, Q24HRS, Shauna Salguero M.D., Stopped at 02/03/19 0555  •  levothyroxine (SYNTHROID) tablet 100 mcg, 100 mcg, Oral, QAM AC, Shauna Salguero M.D., 100 mcg at 02/03/19 0525  •  lisinopril (PRINIVIL) tablet 5 mg, 5 mg, Oral, DAILY, Shauna Salguero M.D., 5 mg at 02/03/19 0525  •  metroNIDAZOLE (FLAGYL) IVPB 500 mg, 500 mg, Intravenous, Q8HRS, Shauna Salguero M.D., Last Rate: 100 mL/hr at 02/03/19 0612, 500 mg at 02/03/19 0612    Medication Allergy:  No Known Allergies    ROS: 13 point review unchanged    Physical Exam:  Vitals/ General Appearance:   Weight/BMI: There is no height or weight on file to calculate BMI.  Blood pressure 149/64, pulse 66, temperature 36.9 °C (98.5 °F), temperature source Temporal, resp. rate 17, SpO2 94 %, not currently breastfeeding.  Vitals:    02/02/19 1745 02/02/19 2130 02/03/19 0100 02/03/19 0500   BP: 154/63 122/54 124/56 149/64   Pulse: 74 80 61 66   Resp: 17 18 18 17   Temp: 37.5 °C (99.5 °F) 36.9 °C (98.5 °F) 36.6 °C (97.9 °F) 36.9 °C (98.5 °F)   TempSrc: Temporal Temporal Temporal Temporal   SpO2: 95% 91% 94% 94%     HEENT: normal  Heart: RRR  Lungs: Clear  Abdomen: +BS, minimal tenderness RUQ      Lab Data Review:  Recent Labs      01/31/19 1935 02/01/19 0524   WBC  16.0*  17.0*   RBC  4.53  4.22   HEMOGLOBIN  13.8  13.0   HEMATOCRIT  40.2  37.8   MCV  88.7  89.6   MCH  30.5  30.8   MCHC  34.3  34.4   RDW  39.6  40.6   PLATELETCT  182  180   MPV  10.2  10.6     Recent Labs      01/31/19 1935 02/01/19 0524 02/02/19 0455   SODIUM  127*  128*  128*   POTASSIUM  3.7  3.8  3.6   CHLORIDE  97  100  100   CO2  22  21  20   GLUCOSE  107*  95  85   BUN  8  7*  6*   CREATININE  0.69  0.68  0.83   CALCIUM  8.3*  7.9*  7.8*     Recent Labs      02/01/19   0524   INR  1.20*             Recent Labs      01/31/19 1935 02/01/19 0524 02/02/19   0455   SODIUM  127*  128*  128*   POTASSIUM  3.7  3.8  3.6   CHLORIDE  97  100   100   CO2  22  21  20   GLUCOSE  107*  95  85   BUN  8  7*  6*     Recent Labs      01/31/19   1935  02/01/19   0524  02/02/19   0455   SODIUM  127*  128*  128*   POTASSIUM  3.7  3.8  3.6   CHLORIDE  97  100  100   CO2  22  21  20   BUN  8  7*  6*   CREATININE  0.69  0.68  0.83   MAGNESIUM  1.6   --    --    CALCIUM  8.3*  7.9*  7.8*     Recent Labs      02/01/19   0524   INR  1.20*          Imaging/Procedures Review:        Assessment and plan:  Pt with CBD stricture refractory to endoscopic management with planned surgery tomorrow for open cholecystectomy and bile duct exploration. Pain well controlled on analgesics and has cholestactic elevation of LFT's and non visualization of gallbladder on HIDA. Will sign off, call if can help.

## 2019-02-03 NOTE — PROGRESS NOTES
Assumed care of patient from RN at 0700.     Reviewed VS, labs, orders, and notes.     Pt SITTING UP IN BED . A&Ox 4.    /64   Pulse 66   Temp 36.9 °C (98.5 °F) (Temporal)   Resp 17   Wt 54.5 kg (120 lb 2.4 oz)   LMP  (LMP Unknown)   SpO2 94%   Breastfeeding? No   BMI 21.28 kg/m² . MEWS Score: 0.     On room air. Denies SOB.    Moves all extremities, denies numbness or tingling.     Skin assessed over bony prominences and under medical devices.     Voiding, hypoactive BS, + flatus, LBM 2/3.     Clear liquid diet no reds diet, experiencing nausea medicated per MAR    Patient reports 6 /10 pain in abdomen, previously medicated per MAR.     Pt. is up ad bravo assist with steady gait.    Fall prevention education reinforced with pt. Pt is wearing treaded slipper socks, IV pole is on the same side as the bathroom, lower bedrails are down. Pt calls appropriatly.     Discussed POC, all questions answered at this time. Bed is locked and in the lowest position, call light within reach, Q 1 hour rounding in place. All needs met at this time.

## 2019-02-03 NOTE — PROGRESS NOTES
I evaluated Ms. Hopper this evening in room [place] [id number].  She is comfortable and has no further questions about the plan for Mondays possible procedure.  Please refer to Dr. Salguero's H&P.

## 2019-02-03 NOTE — PROGRESS NOTES
Hospital Medicine Daily Progress Note    Date of Service  2/3/2019    Chief Complaint  73 y.o. female admitted 2/2/2019 with abdominal pain    Hospital Course    Remote history of biliary surgery at 8yrs old  History of biliary stricture, stricture right above the pancreatic head, obvious with extreme dilation of   the extrahepatic ducts and intrahepatic ducts, several ERCPs most recent 1/31 done by Dr. Moore, GI where   he was able to cannulate through this very tight stricture in the common hepatic duct with upstream dilation of the biliary system with a wire and then placed a fully covered metal stent with a plan  to repeat, it was to obviously bridge this stricture; also follows Dr. Londono, Surgery.   Admitted 1/31 to [facility] for RUQ pain with CT imaging from outlying facility.with biliary obstruction, gallbladder distention.   HIDA scan showed nonvisualization of the gallbladder suggestive of cystic duct obstruction which can be seen in acute cholecystitis.  Dr. Londono, Surgery consulted 2/1,  transferred to [facility] 2/3 to evaluate for choledochoscopy, intraoperative ultrasound, and if needed a Cecily-en-Y hepaticojejunostomy and open cholecystectomy.      Interval Problem Update  2/3. Still has RUQ pain but controlled with pain meds. Spoke to her and her family at length about the plan.    Consultants/Specialty  GI  Surgery    Code Status  full    Disposition  May need advanced level of care postoperatively  PT/OT ordered.    Review of Systems  Review of Systems   Constitutional: Negative for chills and fever.   HENT: Negative for congestion, hearing loss and nosebleeds.    Eyes: Negative for pain and redness.   Respiratory: Negative for cough, hemoptysis, shortness of breath and wheezing.    Cardiovascular: Negative for chest pain and palpitations.   Gastrointestinal: Positive for abdominal pain. Negative for blood in stool, constipation, diarrhea, nausea and vomiting.   Genitourinary:  Negative for dysuria, frequency and hematuria.   Musculoskeletal: Negative for falls, joint pain and myalgias.   Skin: Negative for rash.   Neurological: Negative for dizziness, tremors, focal weakness, seizures, loss of consciousness, weakness and headaches.   Psychiatric/Behavioral: The patient is not nervous/anxious and does not have insomnia.    All other systems reviewed and are negative.       Physical Exam  Temp:  [36.5 °C (97.7 °F)-37.5 °C (99.5 °F)] 36.5 °C (97.7 °F)  Pulse:  [61-80] 66  Resp:  [15-18] 15  BP: (122-154)/(54-64) 136/59  SpO2:  [91 %-95 %] 91 %    Physical Exam   Constitutional:   Thin, elderly     Abdominal: There is tenderness (Mild, RUQ).       Fluids    Intake/Output Summary (Last 24 hours) at 02/03/19 1456  Last data filed at 02/03/19 1345   Gross per 24 hour   Intake             1920 ml   Output                0 ml   Net             1920 ml       Laboratory  Recent Labs      01/31/19   1935  02/01/19   0524   WBC  16.0*  17.0*   RBC  4.53  4.22   HEMOGLOBIN  13.8  13.0   HEMATOCRIT  40.2  37.8   MCV  88.7  89.6   MCH  30.5  30.8   MCHC  34.3  34.4   RDW  39.6  40.6   PLATELETCT  182  180   MPV  10.2  10.6     Recent Labs      02/01/19   0524  02/02/19   0455  02/03/19   1034   SODIUM  128*  128*  131*   POTASSIUM  3.8  3.6  3.3*   CHLORIDE  100  100  102   CO2  21  20  24   GLUCOSE  95  85  101*   BUN  7*  6*  5*   CREATININE  0.68  0.83  0.52   CALCIUM  7.9*  7.8*  7.6*     Recent Labs      02/01/19   0524   INR  1.20*               Imaging  No orders to display        Assessment/Plan  * Biliary obstruction- (present on admission)   Assessment & Plan    Remote history of biliary surgery at 8yrs old  History of biliary stricture, stricture right above the pancreatic head, obvious with extreme dilation of   the extrahepatic ducts and intrahepatic ducts, several ERCPs most recent 1/31 done by Dr. Moore, GI where   he was able to cannulate through this very tight stricture in the common  hepatic duct with upstream dilation of the biliary system with a wire and then placed a fully covered metal stent with a plan  to repeat, it was to obviously bridge this stricture; also follows Dr. Londono, Surgery.   Admitted 1/31 to [facility] for RUQ pain with CT imaging from outlying facility.with biliary obstruction, gallbladder distention.   HIDA scan showed nonvisualization of the gallbladder suggestive of cystic duct obstruction which can be seen in acute cholecystitis.  Dr. Londono, Surgery consulted 2/1,  transferred to [facility] 2/3 to evaluate for choledochoscopy, intraoperative ultrasound, and if needed a Cecily-en-Y hepaticojejunostomy and open cholecystectomy.     Cholecystitis   Assessment & Plan    Possible on HIDA scan  As above     Biliary stricture   Assessment & Plan    Chronic, with possible stent malfunction  As above     Transaminitis and hyperbilirubinemia- (present on admission)   Assessment & Plan    Due to above     Dyslipidemia   Assessment & Plan    Statin held for now because of transaminitis     Hyponatremia- (present on admission)   Assessment & Plan    Mild, stable     Hypothyroid- (present on admission)   Assessment & Plan    Continue levothyroxine     Hypertension- (present on admission)   Assessment & Plan    Stable  On lisinopril          VTE prophylaxis: Reviewed vitals, labs, imaging, staff notes.  Discussed assessment and plan with Clementine Hopper and family  Discussed with RN.    I spent 36 minutes as well explaining plan and surgery to family and patient at bedside.

## 2019-02-03 NOTE — PROGRESS NOTES
Surgical Progress Note    Author: Pablo Cannon Date & Time created: 2/3/2019   11:06 AM     Interval Events:  The patient was transferred down to [facility].  Patient's bilirubin has increased over the last several days following the stent placement.  It does appear that the stent is unable to manage her biliary stricture.  This is a long standing stricture that has caused multiple bouts of cholangitis in the past.  She was transferred up to the [facility] hospital in order to proceed with a definitive operation.  She is here with her daughter.  She is comfortable.  She is jaundiced, we are awaiting the labs.  Review of Systems   Constitutional:        Her urine is very dark consistent with hyperbilirubinemia   All other systems reviewed and are negative.    Hemodynamics:  Temp (24hrs), Av.9 °C (98.4 °F), Min:36.5 °C (97.7 °F), Max:37.5 °C (99.5 °F)  Temperature: 36.5 °C (97.7 °F)  Pulse  Av.4  Min: 61  Max: 80   Blood Pressure : 136/59     Respiratory:    Respiration: 15, Pulse Oximetry: 91 %           Neuro:  GCS       Fluids:    Intake/Output Summary (Last 24 hours) at 19 1106  Last data filed at 19 0900   Gross per 24 hour   Intake             1680 ml   Output                0 ml   Net             1680 ml     Weight: 54.5 kg (120 lb 2.4 oz)  Current Diet Order   Procedures   • Diet Order Clear Liquids - No Red Foods   • Diet NPO     Physical Exam   Constitutional: She appears well-developed and well-nourished.   HENT:   Head: Normocephalic and atraumatic.   Eyes: Scleral icterus is present.   Neck: Normal range of motion. Neck supple.   Cardiovascular: Normal rate and regular rhythm.    Pulmonary/Chest: Effort normal and breath sounds normal.   Abdominal: Soft. Bowel sounds are normal. There is tenderness.   Slight epigastric pain/right upper quadrant   Musculoskeletal: Normal range of motion.   Neurological: She is alert.   Skin: Skin is warm and dry.   Mildly jaundiced   Psychiatric: She  has a normal mood and affect. Her behavior is normal. Judgment and thought content normal.   Nursing note and vitals reviewed.    Labs:  No results found for this or any previous visit (from the past 24 hour(s)).  Medical Decision Making, by Problem:  Active Hospital Problems    Diagnosis   • Biliary stricture [K83.1]     Priority: High   • Cholecystitis [K81.9]     Priority: High   • Transaminitis and hyperbilirubinemia [R74.0]     Priority: High   • Obstructive jaundice with RUQ pain [K83.8]     Priority: High   • Dyslipidemia [E78.5]   • Hyponatremia [E87.1]   • Hypothyroid [E03.9]   • Hypertension [I10]     Plan:  Patient is scheduled for an exporter laparotomy, resection of extrahepatic ducts, Cecily-en-Y hepaticojejunostomy, cholecystectomy, and other indicated procedures.  This has been scheduled for February 4, 2019.  We discussed the risks and benefits of the procedure including bleeding, infection due to the fact that she has a stent placed but this is a contaminated case, a bile leak of 1-2%, and any other anastomosis leak of 1-2%.  I did instruct her that we would resect her extrahepatic ducts to confirm that there was no malignancy at the stricture.  She understood that postoperatively she may be found to have malignancy and possibly positive margins but presently the last set of pathology was negative for malignancy.  She understood this and is agreed the above plan.    Quality Measures:  Quality-Core Measures    Discussed patient condition with Family and RN

## 2019-02-03 NOTE — PROGRESS NOTES
Pt aox 4. No visible signs of distress. VSS.  Tolerating medication regimen without any signs or symptoms of adverse reactions.  Pt reports 6/10 RUQ abdominal pain, medicated per MAR.  Tolerating diet without any n/v. + BS, + BM  Ambulatory self. Steady gait.  On room air, no complaints of SOB.  Bed locked and in low position.  Call light within reach and able to make all needs be known.  Will continue to monitor.

## 2019-02-03 NOTE — DIETARY
"Nutrition services: Day 1 of admit.  Clementine Hopper is a 73 y.o. female with admitting DX of Abdominal Pain  Consult received for Poor PO + Weight loss on nutrition admit screen    Current Problem List:  1. Biliary Stricture  2. Cholecystitis  3. Transaminitis and Hyperbilirubinemia  4. Obstructive Jaundice with RUQ pain  5. Hypothyroidism    Assessment:  Height: 160 cm (5' 3\"), Weight: 54.5 kg (120 lb)  Body mass index is 21.28 kg/m². (WNL)  Diet/Intake: Clear Liquids, PO 50-75% x 1 meal thus far (note since pt was seen ehr diet has been upgraded to GI soft)    Evaluation:   1. Attempted RD evaluation, upon entering room, the patients daughter asked me to leave and come back another time as she wished to let the pt sleep. Unable to obtain full nutrition assessment at this time, RD following.  2. Per chart review of past encounters, pt weighed 130# 6 months ago, indicating she has experienced 10#/8% unintentional weight loss x 6 months, not classified as significant but is worth noting.   3. Na: 131, K: 3.3  4. Fluids: NS @ 100 mL/hr  5. Pertinent meds: PRN zofran, PRN Roxicodone  6. +BM today    Recommendations/Plan:  1. Added supplement: Boost Plus TID to help optimize nutrition status  2. Encourage intake of meals and supplements  3. Document intake of all meals and supplements  as % taken in ADL's to provide interdisciplinary communication across all shifts.   4. Monitor weight.  5. Nutrition rep will continue to see patient for ongoing meal and snack preferences.     RD following        "

## 2019-02-03 NOTE — ASSESSMENT & PLAN NOTE
S/p drainage on 2/4  Cont antibiotics  I discussed case with ID and Will need repeat CT scan prior to d/c antibiotics.

## 2019-02-03 NOTE — CARE PLAN
Problem: Communication  Goal: The ability to communicate needs accurately and effectively will improve    Intervention: [place] patient and significant other/support system to call light to alert staff of needs  Pt oriented to room, unit, and call light.

## 2019-02-04 PROBLEM — K59.00 CONSTIPATION: Status: ACTIVE | Noted: 2019-02-04

## 2019-02-04 PROBLEM — D64.9 ANEMIA: Status: ACTIVE | Noted: 2019-02-04

## 2019-02-04 LAB
ALBUMIN SERPL BCP-MCNC: 2.5 G/DL (ref 3.2–4.9)
ALBUMIN/GLOB SERPL: 1.4 G/DL
ALP SERPL-CCNC: 233 U/L (ref 30–99)
ALT SERPL-CCNC: 59 U/L (ref 2–50)
ANION GAP SERPL CALC-SCNC: 5 MMOL/L (ref 0–11.9)
AST SERPL-CCNC: 14 U/L (ref 12–45)
BASOPHILS # BLD AUTO: 0.2 % (ref 0–1.8)
BASOPHILS # BLD: 0.02 K/UL (ref 0–0.12)
BILIRUB SERPL-MCNC: 1.4 MG/DL (ref 0.1–1.5)
BUN SERPL-MCNC: 5 MG/DL (ref 8–22)
CALCIUM SERPL-MCNC: 7.6 MG/DL (ref 8.5–10.5)
CHLORIDE SERPL-SCNC: 105 MMOL/L (ref 96–112)
CO2 SERPL-SCNC: 23 MMOL/L (ref 20–33)
CREAT SERPL-MCNC: 0.58 MG/DL (ref 0.5–1.4)
EOSINOPHIL # BLD AUTO: 0.06 K/UL (ref 0–0.51)
EOSINOPHIL NFR BLD: 0.6 % (ref 0–6.9)
ERYTHROCYTE [DISTWIDTH] IN BLOOD BY AUTOMATED COUNT: 43.4 FL (ref 35.9–50)
FERRITIN SERPL-MCNC: 967.5 NG/ML (ref 10–291)
GLOBULIN SER CALC-MCNC: 1.8 G/DL (ref 1.9–3.5)
GLUCOSE SERPL-MCNC: 101 MG/DL (ref 65–99)
GRAM STN SPEC: NORMAL
HCT VFR BLD AUTO: 29.5 % (ref 37–47)
HGB BLD-MCNC: 9.9 G/DL (ref 12–16)
IMM GRANULOCYTES # BLD AUTO: 0.06 K/UL (ref 0–0.11)
IMM GRANULOCYTES NFR BLD AUTO: 0.6 % (ref 0–0.9)
INR PPP: 1.14 (ref 0.87–1.13)
IRON SATN MFR SERPL: 38 % (ref 15–55)
IRON SERPL-MCNC: 65 UG/DL (ref 40–170)
LACTATE BLD-SCNC: 1.1 MMOL/L (ref 0.5–2)
LYMPHOCYTES # BLD AUTO: 0.85 K/UL (ref 1–4.8)
LYMPHOCYTES NFR BLD: 8.9 % (ref 22–41)
MCH RBC QN AUTO: 30.8 PG (ref 27–33)
MCHC RBC AUTO-ENTMCNC: 33.3 G/DL (ref 33.6–35)
MCV RBC AUTO: 92.5 FL (ref 81.4–97.8)
MONOCYTES # BLD AUTO: 0.71 K/UL (ref 0–0.85)
MONOCYTES NFR BLD AUTO: 7.5 % (ref 0–13.4)
NEUTROPHILS # BLD AUTO: 7.8 K/UL (ref 2–7.15)
NEUTROPHILS NFR BLD: 82.2 % (ref 44–72)
NRBC # BLD AUTO: 0 K/UL
NRBC BLD-RTO: 0 /100 WBC
PATHOLOGY CONSULT NOTE: NORMAL
PLATELET # BLD AUTO: 189 K/UL (ref 164–446)
PMV BLD AUTO: 9.9 FL (ref 9–12.9)
POTASSIUM SERPL-SCNC: 3.4 MMOL/L (ref 3.6–5.5)
PROT SERPL-MCNC: 4.3 G/DL (ref 6–8.2)
PROTHROMBIN TIME: 14.7 SEC (ref 12–14.6)
RBC # BLD AUTO: 3.18 M/UL (ref 4.2–5.4)
SIGNIFICANT IND 70042: NORMAL
SITE SITE: NORMAL
SODIUM SERPL-SCNC: 133 MMOL/L (ref 135–145)
SOURCE SOURCE: NORMAL
TIBC SERPL-MCNC: 172 UG/DL (ref 250–450)
WBC # BLD AUTO: 9.5 K/UL (ref 4.8–10.8)

## 2019-02-04 PROCEDURE — 700101 HCHG RX REV CODE 250

## 2019-02-04 PROCEDURE — 700111 HCHG RX REV CODE 636 W/ 250 OVERRIDE (IP)

## 2019-02-04 PROCEDURE — 700111 HCHG RX REV CODE 636 W/ 250 OVERRIDE (IP): Performed by: ANESTHESIOLOGY

## 2019-02-04 PROCEDURE — 47600 CHOLECYSTECTOMY: CPT | Performed by: SURGERY

## 2019-02-04 PROCEDURE — 83605 ASSAY OF LACTIC ACID: CPT

## 2019-02-04 PROCEDURE — 160035 HCHG PACU - 1ST 60 MINS PHASE I: Performed by: SURGERY

## 2019-02-04 PROCEDURE — 83540 ASSAY OF IRON: CPT

## 2019-02-04 PROCEDURE — A9270 NON-COVERED ITEM OR SERVICE: HCPCS | Performed by: INTERNAL MEDICINE

## 2019-02-04 PROCEDURE — 160002 HCHG RECOVERY MINUTES (STAT): Performed by: SURGERY

## 2019-02-04 PROCEDURE — 502571 HCHG PACK, LAP CHOLE: Performed by: SURGERY

## 2019-02-04 PROCEDURE — 700102 HCHG RX REV CODE 250 W/ 637 OVERRIDE(OP)

## 2019-02-04 PROCEDURE — 770001 HCHG ROOM/CARE - MED/SURG/GYN PRIV*

## 2019-02-04 PROCEDURE — 87040 BLOOD CULTURE FOR BACTERIA: CPT

## 2019-02-04 PROCEDURE — 47010 HEPATOT OPN DRG ABSC/CST 1/2: CPT | Performed by: SURGERY

## 2019-02-04 PROCEDURE — 501838 HCHG SUTURE GENERAL: Performed by: SURGERY

## 2019-02-04 PROCEDURE — 160029 HCHG SURGERY MINUTES - 1ST 30 MINS LEVEL 4: Performed by: SURGERY

## 2019-02-04 PROCEDURE — 0FT40ZZ RESECTION OF GALLBLADDER, OPEN APPROACH: ICD-10-PCS | Performed by: SURGERY

## 2019-02-04 PROCEDURE — 160009 HCHG ANES TIME/MIN: Performed by: SURGERY

## 2019-02-04 PROCEDURE — 80053 COMPREHEN METABOLIC PANEL: CPT

## 2019-02-04 PROCEDURE — 500389 HCHG DRAIN, RESERVOIR SUCT JP 100CC: Performed by: SURGERY

## 2019-02-04 PROCEDURE — 87070 CULTURE OTHR SPECIMN AEROBIC: CPT

## 2019-02-04 PROCEDURE — 700105 HCHG RX REV CODE 258: Performed by: INTERNAL MEDICINE

## 2019-02-04 PROCEDURE — 87205 SMEAR GRAM STAIN: CPT

## 2019-02-04 PROCEDURE — 0F900ZZ DRAINAGE OF LIVER, OPEN APPROACH: ICD-10-PCS | Performed by: SURGERY

## 2019-02-04 PROCEDURE — 82728 ASSAY OF FERRITIN: CPT

## 2019-02-04 PROCEDURE — 700101 HCHG RX REV CODE 250: Performed by: INTERNAL MEDICINE

## 2019-02-04 PROCEDURE — 160036 HCHG PACU - EA ADDL 30 MINS PHASE I: Performed by: SURGERY

## 2019-02-04 PROCEDURE — 501445 HCHG STAPLER, SKIN DISP: Performed by: SURGERY

## 2019-02-04 PROCEDURE — 700111 HCHG RX REV CODE 636 W/ 250 OVERRIDE (IP): Performed by: INTERNAL MEDICINE

## 2019-02-04 PROCEDURE — 0W9G0ZZ DRAINAGE OF PERITONEAL CAVITY, OPEN APPROACH: ICD-10-PCS | Performed by: SURGERY

## 2019-02-04 PROCEDURE — 85025 COMPLETE CBC W/AUTO DIFF WBC: CPT

## 2019-02-04 PROCEDURE — 700102 HCHG RX REV CODE 250 W/ 637 OVERRIDE(OP): Performed by: INTERNAL MEDICINE

## 2019-02-04 PROCEDURE — 83550 IRON BINDING TEST: CPT

## 2019-02-04 PROCEDURE — 87075 CULTR BACTERIA EXCEPT BLOOD: CPT

## 2019-02-04 PROCEDURE — 700111 HCHG RX REV CODE 636 W/ 250 OVERRIDE (IP): Performed by: SURGERY

## 2019-02-04 PROCEDURE — 88304 TISSUE EXAM BY PATHOLOGIST: CPT

## 2019-02-04 PROCEDURE — 700101 HCHG RX REV CODE 250: Performed by: SURGERY

## 2019-02-04 PROCEDURE — 160048 HCHG OR STATISTICAL LEVEL 1-5: Performed by: SURGERY

## 2019-02-04 PROCEDURE — A9270 NON-COVERED ITEM OR SERVICE: HCPCS

## 2019-02-04 PROCEDURE — 85610 PROTHROMBIN TIME: CPT

## 2019-02-04 PROCEDURE — 501450 HCHG STAPLES, ENDO MULTIFIRE: Performed by: SURGERY

## 2019-02-04 PROCEDURE — 99233 SBSQ HOSP IP/OBS HIGH 50: CPT | Performed by: INTERNAL MEDICINE

## 2019-02-04 PROCEDURE — 36415 COLL VENOUS BLD VENIPUNCTURE: CPT

## 2019-02-04 PROCEDURE — 0FJ44ZZ INSPECTION OF GALLBLADDER, PERCUTANEOUS ENDOSCOPIC APPROACH: ICD-10-PCS | Performed by: SURGERY

## 2019-02-04 PROCEDURE — 49020 DRAINAGE ABDOM ABSCESS OPEN: CPT | Mod: 59 | Performed by: SURGERY

## 2019-02-04 PROCEDURE — 160041 HCHG SURGERY MINUTES - EA ADDL 1 MIN LEVEL 4: Performed by: SURGERY

## 2019-02-04 PROCEDURE — 76998 US GUIDE INTRAOP: CPT | Mod: 26 | Performed by: SURGERY

## 2019-02-04 RX ORDER — LEVOTHYROXINE SODIUM 0.05 MG/1
100 TABLET ORAL DAILY
Status: DISCONTINUED | OUTPATIENT
Start: 2019-02-04 | End: 2019-02-09 | Stop reason: HOSPADM

## 2019-02-04 RX ORDER — BUPIVACAINE HYDROCHLORIDE AND EPINEPHRINE 5; 5 MG/ML; UG/ML
INJECTION, SOLUTION EPIDURAL; INTRACAUDAL; PERINEURAL
Status: DISCONTINUED | OUTPATIENT
Start: 2019-02-04 | End: 2019-02-04 | Stop reason: HOSPADM

## 2019-02-04 RX ORDER — METOPROLOL TARTRATE 1 MG/ML
1 INJECTION, SOLUTION INTRAVENOUS
Status: DISCONTINUED | OUTPATIENT
Start: 2019-02-04 | End: 2019-02-04 | Stop reason: HOSPADM

## 2019-02-04 RX ORDER — HYDROMORPHONE HYDROCHLORIDE 1 MG/ML
0.1 INJECTION, SOLUTION INTRAMUSCULAR; INTRAVENOUS; SUBCUTANEOUS
Status: DISCONTINUED | OUTPATIENT
Start: 2019-02-04 | End: 2019-02-04 | Stop reason: HOSPADM

## 2019-02-04 RX ORDER — DIPHENHYDRAMINE HYDROCHLORIDE 50 MG/ML
12.5 INJECTION INTRAMUSCULAR; INTRAVENOUS
Status: DISCONTINUED | OUTPATIENT
Start: 2019-02-04 | End: 2019-02-04 | Stop reason: HOSPADM

## 2019-02-04 RX ORDER — OXYCODONE HCL 5 MG/5 ML
5 SOLUTION, ORAL ORAL
Status: DISCONTINUED | OUTPATIENT
Start: 2019-02-04 | End: 2019-02-04 | Stop reason: HOSPADM

## 2019-02-04 RX ORDER — ONDANSETRON 2 MG/ML
4 INJECTION INTRAMUSCULAR; INTRAVENOUS
Status: DISCONTINUED | OUTPATIENT
Start: 2019-02-04 | End: 2019-02-04 | Stop reason: HOSPADM

## 2019-02-04 RX ORDER — ACETAMINOPHEN 500 MG
1000 TABLET ORAL ONCE
Status: DISCONTINUED | OUTPATIENT
Start: 2019-02-04 | End: 2019-02-04 | Stop reason: HOSPADM

## 2019-02-04 RX ORDER — HALOPERIDOL 5 MG/ML
0.5 INJECTION INTRAMUSCULAR
Status: DISCONTINUED | OUTPATIENT
Start: 2019-02-04 | End: 2019-02-04 | Stop reason: HOSPADM

## 2019-02-04 RX ORDER — SODIUM CHLORIDE AND POTASSIUM CHLORIDE 300; 900 MG/100ML; MG/100ML
INJECTION, SOLUTION INTRAVENOUS CONTINUOUS
Status: DISCONTINUED | OUTPATIENT
Start: 2019-02-04 | End: 2019-02-06

## 2019-02-04 RX ORDER — CELECOXIB 200 MG/1
200 CAPSULE ORAL ONCE
Status: DISCONTINUED | OUTPATIENT
Start: 2019-02-04 | End: 2019-02-04 | Stop reason: HOSPADM

## 2019-02-04 RX ORDER — HYDRALAZINE HYDROCHLORIDE 20 MG/ML
5 INJECTION INTRAMUSCULAR; INTRAVENOUS
Status: DISCONTINUED | OUTPATIENT
Start: 2019-02-04 | End: 2019-02-04 | Stop reason: HOSPADM

## 2019-02-04 RX ORDER — PROMETHAZINE HYDROCHLORIDE 25 MG/1
25 TABLET ORAL EVERY 6 HOURS PRN
Status: DISCONTINUED | OUTPATIENT
Start: 2019-02-04 | End: 2019-02-09 | Stop reason: HOSPADM

## 2019-02-04 RX ORDER — SODIUM CHLORIDE, SODIUM GLUCONATE, SODIUM ACETATE, POTASSIUM CHLORIDE AND MAGNESIUM CHLORIDE 526; 502; 368; 37; 30 MG/100ML; MG/100ML; MG/100ML; MG/100ML; MG/100ML
INJECTION, SOLUTION INTRAVENOUS CONTINUOUS
Status: DISCONTINUED | OUTPATIENT
Start: 2019-02-04 | End: 2019-02-04 | Stop reason: HOSPADM

## 2019-02-04 RX ORDER — OXYCODONE HCL 5 MG/5 ML
10 SOLUTION, ORAL ORAL
Status: DISCONTINUED | OUTPATIENT
Start: 2019-02-04 | End: 2019-02-04 | Stop reason: HOSPADM

## 2019-02-04 RX ORDER — SODIUM CHLORIDE 9 MG/ML
500 INJECTION, SOLUTION INTRAVENOUS
Status: DISCONTINUED | OUTPATIENT
Start: 2019-02-04 | End: 2019-02-09 | Stop reason: HOSPADM

## 2019-02-04 RX ORDER — HYDROMORPHONE HYDROCHLORIDE 1 MG/ML
0.4 INJECTION, SOLUTION INTRAMUSCULAR; INTRAVENOUS; SUBCUTANEOUS
Status: DISCONTINUED | OUTPATIENT
Start: 2019-02-04 | End: 2019-02-04 | Stop reason: HOSPADM

## 2019-02-04 RX ORDER — HYDROMORPHONE HYDROCHLORIDE 1 MG/ML
0.2 INJECTION, SOLUTION INTRAMUSCULAR; INTRAVENOUS; SUBCUTANEOUS
Status: DISCONTINUED | OUTPATIENT
Start: 2019-02-04 | End: 2019-02-04 | Stop reason: HOSPADM

## 2019-02-04 RX ADMIN — BISACODYL 10 MG: 10 SUPPOSITORY RECTAL at 08:48

## 2019-02-04 RX ADMIN — POTASSIUM CHLORIDE AND SODIUM CHLORIDE: 900; 300 INJECTION, SOLUTION INTRAVENOUS at 16:54

## 2019-02-04 RX ADMIN — MORPHINE SULFATE: 50 INJECTION, SOLUTION, CONCENTRATE INTRAVENOUS at 16:07

## 2019-02-04 RX ADMIN — CEFTRIAXONE SODIUM 2 G: 2 INJECTION, POWDER, FOR SOLUTION INTRAMUSCULAR; INTRAVENOUS at 05:08

## 2019-02-04 RX ADMIN — ONDANSETRON 4 MG: 2 INJECTION INTRAMUSCULAR; INTRAVENOUS at 05:11

## 2019-02-04 RX ADMIN — HYDROMORPHONE HYDROCHLORIDE 0.4 MG: 1 INJECTION, SOLUTION INTRAMUSCULAR; INTRAVENOUS; SUBCUTANEOUS at 15:15

## 2019-02-04 RX ADMIN — HYDROMORPHONE HYDROCHLORIDE 0.4 MG: 1 INJECTION, SOLUTION INTRAMUSCULAR; INTRAVENOUS; SUBCUTANEOUS at 15:05

## 2019-02-04 RX ADMIN — METRONIDAZOLE 500 MG: 500 INJECTION, SOLUTION INTRAVENOUS at 05:53

## 2019-02-04 RX ADMIN — SODIUM CHLORIDE: 9 INJECTION, SOLUTION INTRAVENOUS at 03:55

## 2019-02-04 RX ADMIN — LISINOPRIL 5 MG: 5 TABLET ORAL at 05:53

## 2019-02-04 RX ADMIN — ONDANSETRON 4 MG: 2 INJECTION INTRAMUSCULAR; INTRAVENOUS at 17:13

## 2019-02-04 RX ADMIN — SENNOSIDES AND DOCUSATE SODIUM 2 TABLET: 8.6; 5 TABLET ORAL at 05:53

## 2019-02-04 RX ADMIN — LEVOTHYROXINE SODIUM 100 MCG: 50 TABLET ORAL at 05:53

## 2019-02-04 RX ADMIN — HYDROMORPHONE HYDROCHLORIDE 0.2 MG: 1 INJECTION, SOLUTION INTRAMUSCULAR; INTRAVENOUS; SUBCUTANEOUS at 15:32

## 2019-02-04 RX ADMIN — METRONIDAZOLE 500 MG: 500 INJECTION, SOLUTION INTRAVENOUS at 22:55

## 2019-02-04 ASSESSMENT — ENCOUNTER SYMPTOMS
BLOOD IN STOOL: 0
WEAKNESS: 0
LOSS OF CONSCIOUSNESS: 0
CONSTIPATION: 1
SEIZURES: 0
WHEEZING: 0
SHORTNESS OF BREATH: 0
EYE REDNESS: 0
CHILLS: 0
MYALGIAS: 0
DIARRHEA: 0
VOMITING: 0
TREMORS: 0
DIZZINESS: 0
PALPITATIONS: 0
INSOMNIA: 0
EYE PAIN: 0
COUGH: 0
FALLS: 0
ABDOMINAL PAIN: 1
HEMOPTYSIS: 0
FEVER: 0
NAUSEA: 0
NERVOUS/ANXIOUS: 0
HEADACHES: 0
FOCAL WEAKNESS: 0

## 2019-02-04 NOTE — PROGRESS NOTES
Hospital Medicine Daily Progress Note    Date of Service  2/4/2019    Chief Complaint  73 y.o. female admitted 2/2/2019 with abdominal pain    Hospital Course    Remote history of biliary surgery at 8yrs old  History of biliary stricture, stricture right above the pancreatic head, obvious with extreme dilation of   the extrahepatic ducts and intrahepatic ducts, several ERCPs most recent 1/31 done by Dr. Moore, GI where   he was able to cannulate through this very tight stricture in the common hepatic duct with upstream dilation of the biliary system with a wire and then placed a fully covered metal stent with a plan  to repeat, it was to obviously bridge this stricture; also follows Dr. Londono, Surgery.   Admitted 1/31 to [facility] for RUQ pain with CT imaging from outlying facility.with biliary obstruction, gallbladder distention.   HIDA scan showed nonvisualization of the gallbladder suggestive of cystic duct obstruction which can be seen in acute cholecystitis.  Dr. Londono, Surgery consulted 2/1,  transferred to [facility] 2/3 to evaluate for choledochoscopy, intraoperative ultrasound, and if needed a Cecily-en-Y hepaticojejunostomy and open cholecystectomy. He planned to do this versus cholecystectomy only on 2/4.      Interval Problem Update  2/3. Still has RUQ pain but controlled with pain meds. Spoke to her and her family at length about the plan.  2/4. Planned for surgery today. She is having symptoms of indigestion and constipation.    Consultants/Specialty  GI  Surgery    Code Status  full    Disposition  May need advanced level of care postoperatively  PT/OT ordered.    Review of Systems  Review of Systems   Constitutional: Negative for chills and fever.   HENT: Negative for congestion, hearing loss and nosebleeds.    Eyes: Negative for pain and redness.   Respiratory: Negative for cough, hemoptysis, shortness of breath and wheezing.    Cardiovascular: Negative for chest pain and  palpitations.   Gastrointestinal: Positive for abdominal pain and constipation. Negative for blood in stool, diarrhea, nausea and vomiting.   Genitourinary: Negative for dysuria, frequency and hematuria.   Musculoskeletal: Negative for falls, joint pain and myalgias.   Skin: Negative for rash.   Neurological: Negative for dizziness, tremors, focal weakness, seizures, loss of consciousness, weakness and headaches.   Psychiatric/Behavioral: The patient is not nervous/anxious and does not have insomnia.    All other systems reviewed and are negative.       Physical Exam  Temp:  [36.5 °C (97.7 °F)-37.4 °C (99.3 °F)] 36.6 °C (97.9 °F)  Pulse:  [56-71] 58  Resp:  [16] 16  BP: (116-170)/(44-69) 170/62  SpO2:  [90 %-93 %] 92 %    Physical Exam   Constitutional: She appears well-developed.   Thin, elderly     HENT:   Head: Normocephalic and atraumatic.   Eyes: Conjunctivae are normal. No scleral icterus.   Neck: Normal range of motion. Neck supple.   Cardiovascular: Normal rate and regular rhythm.  Exam reveals no gallop and no friction rub.    No murmur heard.  Pulmonary/Chest: Effort normal and breath sounds normal. No respiratory distress. She has no wheezes. She has no rales.   Abdominal: Soft. Bowel sounds are normal. She exhibits no distension. There is tenderness (Mild, RUQ). There is no rebound and no guarding.   Indigestion   Musculoskeletal: She exhibits no edema or tenderness.   Neurological: She is alert.   Skin: Skin is warm.   Psychiatric: She has a normal mood and affect. Her behavior is normal.       Fluids    Intake/Output Summary (Last 24 hours) at 02/04/19 1255  Last data filed at 02/04/19 0800   Gross per 24 hour   Intake             1070 ml   Output                0 ml   Net             1070 ml       Laboratory  Recent Labs      02/04/19   0329   WBC  9.5   RBC  3.18*   HEMOGLOBIN  9.9*   HEMATOCRIT  29.5*   MCV  92.5   MCH  30.8   MCHC  33.3*   RDW  43.4   PLATELETCT  189   MPV  9.9     Recent Labs       02/02/19   0455  02/03/19   1034  02/04/19   0329   SODIUM  128*  131*  133*   POTASSIUM  3.6  3.3*  3.4*   CHLORIDE  100  102  105   CO2  20  24  23   GLUCOSE  85  101*  101*   BUN  6*  5*  5*   CREATININE  0.83  0.52  0.58   CALCIUM  7.8*  7.6*  7.6*     Recent Labs      02/04/19   0329   INR  1.14*               Imaging  No orders to display        Assessment/Plan  * Biliary obstruction- (present on admission)   Assessment & Plan    Planned surgery 2.4 by Dr. Miller. Originally he would have taken more than gallbladder out however currently bilirubin has normalized so he is taking only the gallbladder, unless he sees something that he has to take more out.   Disposition depends on how he is postsurgery but family is hoping if it is less invasive surgery she can go home with home health care rather than rehab or SNF     Cholecystitis   Assessment & Plan    Possible on HIDA scan  As above     Biliary stricture   Assessment & Plan    Chronic, with possible stent malfunction  As above     Transaminitis and hyperbilirubinemia- (present on admission)   Assessment & Plan    Due to above  Resolving     Anemia   Assessment & Plan    Mild, no active bleeding.  Did have a Hb drop. Likely dilutional  Ordered iron studies.     Constipation   Assessment & Plan    OK to do suppository     Dyslipidemia   Assessment & Plan    Statin held for now because of transaminitis     Hyponatremia- (present on admission)   Assessment & Plan    Mild, stable     Hypothyroid- (present on admission)   Assessment & Plan    Continue levothyroxine     Hypertension- (present on admission)   Assessment & Plan    Stable  On lisinopril          VTE prophylaxis: Reviewed vitals, labs, imaging, staff notes.  Discussed assessment and plan with Clementine Hopper and family  Discussed with RN.

## 2019-02-04 NOTE — PROGRESS NOTES
Report given to pre op RN.     Patient up to void.   Received CHG wipe bath.  All personal belongings left at bedside.   PIV SL.    Down to pre op with transport in bed.   Daughter to stay at bedside and not go down with patient due to not feeling well and patient preference.

## 2019-02-04 NOTE — PROGRESS NOTES
"Report received from night RN, assumed Care.   Patient is AOx4, responds appropriately.      Patient complaining of lower abdominal pain related to constipation.  Had a very small reported  BM per patient, \"I feel like I need a suppository and there is so much in there still\".  Suppository administered per patient request.   PIV with fluids running.   Daughter at bedside.   Patient is NPO for surgery today, denies nausea/vomiting. + flatus  Up self with steady gait.  Already did a lap around the shields.   Trace edema to BLE.  Patient expressed frustration about being hospitalized so many times and dealing with not feeling well for so long.  Emotional support provided.    Plan of care discussed, all questions answered.      Call light and belongings within reach, treaded slipper socks on, bed in lowest locked position.  Hourly rounding in place, all needs met at this time  "

## 2019-02-04 NOTE — THERAPY
PT consult received, Pt to undergo cholecystectomy today.  Will re attempt as able/appropriate.    Stefania Montano PT/DPT  Pager# [phone]

## 2019-02-04 NOTE — CARE PLAN
Problem: Infection  Goal: Will remain free from infection  Outcome: PROGRESSING SLOWER THAN EXPECTED  Daughter at bedside is feeling unwell per patient.  Educated on importance of hand washing and brought a mask for daughter.       Problem: Fluid Volume:  Goal: Will maintain balanced intake and output  Outcome: PROGRESSING AS EXPECTED  IVF in place.     Problem: Psychosocial Needs:  Goal: Level of anxiety will decrease  Outcome: PROGRESSING AS EXPECTED  Daughter at bedside.   Patient expressed frustration with coping with being in the hospital/feeling unwell for an extended amount of time.

## 2019-02-04 NOTE — PROGRESS NOTES
Pt aox 4. No visible signs of distress. VSS.  Tolerating medication regimen without any signs or symptoms of adverse reactions.  Pt reports 6/10 abdominal pain, medicated per MAR.  Pt continues to have nausea without emesis. + BS, + BM  NPO since midnight for procedure.  Ambulatory by self, steady gait.  On room air, no complaints of SOB.  Bed locked and in low position.  Call light within reach and able to make all needs be known.  Will continue to monitor.

## 2019-02-04 NOTE — PROGRESS NOTES
Surgical Progress Note    Author: Pablo Cannon Date & Time created: 2019   12:41 PM     Interval Events:  The patient is doing better.  Her bilirubin dropped to normal C.  She still has epigastric pain right upper quadrant.  She is still scheduled for surgery we will proceed with a laparoscopic possible open cholecystectomy.  Review of Systems   Gastrointestinal: Positive for abdominal pain.   All other systems reviewed and are negative.    Hemodynamics:  Temp (24hrs), Av.9 °C (98.5 °F), Min:36.5 °C (97.7 °F), Max:37.4 °C (99.3 °F)  Temperature: 36.6 °C (97.9 °F)  Pulse  Av.9  Min: 56  Max: 80   Blood Pressure : (!) 170/62     Respiratory:    Respiration: 16, Pulse Oximetry: 92 %           Neuro:  GCS       Fluids:    Intake/Output Summary (Last 24 hours) at 19 1241  Last data filed at 19 0800   Gross per 24 hour   Intake             1070 ml   Output                0 ml   Net             1070 ml        Current Diet Order   Procedures   • Diet NPO     Physical Exam   Constitutional: She is oriented to person, place, and time. She appears well-nourished.   HENT:   Head: Normocephalic and atraumatic.   Eyes: Pupils are equal, round, and reactive to light. Conjunctivae are normal.   Neck: Normal range of motion. Neck supple.   Cardiovascular: Normal rate and regular rhythm.    Pulmonary/Chest: Effort normal and breath sounds normal.   Abdominal: Bowel sounds are normal. There is tenderness.   Neurological: She is alert and oriented to person, place, and time.   Skin: Skin is warm and dry.   Psychiatric: She has a normal mood and affect. Her behavior is normal. Judgment and thought content normal.   Nursing note and vitals reviewed.    Labs:  Recent Results (from the past 24 hour(s))   CBC WITH DIFFERENTIAL    Collection Time: 19  3:29 AM   Result Value Ref Range    WBC 9.5 4.8 - 10.8 K/uL    RBC 3.18 (L) 4.20 - 5.40 M/uL    Hemoglobin 9.9 (L) 12.0 - 16.0 g/dL    Hematocrit 29.5  (L) 37.0 - 47.0 %    MCV 92.5 81.4 - 97.8 fL    MCH 30.8 27.0 - 33.0 pg    MCHC 33.3 (L) 33.6 - 35.0 g/dL    RDW 43.4 35.9 - 50.0 fL    Platelet Count 189 164 - 446 K/uL    MPV 9.9 9.0 - 12.9 fL    Neutrophils-Polys 82.20 (H) 44.00 - 72.00 %    Lymphocytes 8.90 (L) 22.00 - 41.00 %    Monocytes 7.50 0.00 - 13.40 %    Eosinophils 0.60 0.00 - 6.90 %    Basophils 0.20 0.00 - 1.80 %    Immature Granulocytes 0.60 0.00 - 0.90 %    Nucleated RBC 0.00 /100 WBC    Neutrophils (Absolute) 7.80 (H) 2.00 - 7.15 K/uL    Lymphs (Absolute) 0.85 (L) 1.00 - 4.80 K/uL    Monos (Absolute) 0.71 0.00 - 0.85 K/uL    Eos (Absolute) 0.06 0.00 - 0.51 K/uL    Baso (Absolute) 0.02 0.00 - 0.12 K/uL    Immature Granulocytes (abs) 0.06 0.00 - 0.11 K/uL    NRBC (Absolute) 0.00 K/uL   COMP METABOLIC PANEL    Collection Time: 02/04/19  3:29 AM   Result Value Ref Range    Sodium 133 (L) 135 - 145 mmol/L    Potassium 3.4 (L) 3.6 - 5.5 mmol/L    Chloride 105 96 - 112 mmol/L    Co2 23 20 - 33 mmol/L    Anion Gap 5.0 0.0 - 11.9    Glucose 101 (H) 65 - 99 mg/dL    Bun 5 (L) 8 - 22 mg/dL    Creatinine 0.58 0.50 - 1.40 mg/dL    Calcium 7.6 (L) 8.5 - 10.5 mg/dL    AST(SGOT) 14 12 - 45 U/L    ALT(SGPT) 59 (H) 2 - 50 U/L    Alkaline Phosphatase 233 (H) 30 - 99 U/L    Total Bilirubin 1.4 0.1 - 1.5 mg/dL    Albumin 2.5 (L) 3.2 - 4.9 g/dL    Total Protein 4.3 (L) 6.0 - 8.2 g/dL    Globulin 1.8 (L) 1.9 - 3.5 g/dL    A-G Ratio 1.4 g/dL   Prothrombin Time    Collection Time: 02/04/19  3:29 AM   Result Value Ref Range    PT 14.7 (H) 12.0 - 14.6 sec    INR 1.14 (H) 0.87 - 1.13   ESTIMATED GFR    Collection Time: 02/04/19  3:29 AM   Result Value Ref Range    GFR If African American >60 >60 mL/min/1.73 m 2    GFR If Non African American >60 >60 mL/min/1.73 m 2     Medical Decision Making, by Problem:  Active Hospital Problems    Diagnosis   • Biliary stricture [K83.1]     Priority: High   • Cholecystitis [K81.9]     Priority: High   • Transaminitis and hyperbilirubinemia  [R74.0]     Priority: High   • Biliary obstruction [K83.1]     Priority: High   • Dyslipidemia [E78.5]   • Hyponatremia [E87.1]   • Hypothyroid [E03.9]   • Hypertension [I10]     Plan:  Patient has been scheduled for a laparoscopic cholecystectomy instead of a bypass of her bile duct because her bilirubin returned to normal.  We will allow the stent to work as needed.    Quality Measures:  Quality-Core Measures    Discussed patient condition with Family and RN

## 2019-02-04 NOTE — DISCHARGE PLANNING
Anticipated Discharge Disposition: HHC VS SNF    Action: Patient's case was discussed today during IDT Rounds.  Per MD, patient is pending additional surgery and depending on the outcome, patient will either discharge home with HHC or transition to SNF.    Barriers to Discharge: None.    Plan: HHC VS SNF, pending PTOT and Orders.

## 2019-02-05 PROBLEM — K81.0 GANGRENOUS CHOLECYSTITIS: Status: ACTIVE | Noted: 2018-12-11

## 2019-02-05 PROBLEM — K75.0 LIVER ABSCESS: Status: ACTIVE | Noted: 2019-02-03

## 2019-02-05 LAB
ALBUMIN SERPL BCP-MCNC: 2.7 G/DL (ref 3.2–4.9)
ALBUMIN/GLOB SERPL: 1.4 G/DL
ALP SERPL-CCNC: 226 U/L (ref 30–99)
ALT SERPL-CCNC: 71 U/L (ref 2–50)
ANION GAP SERPL CALC-SCNC: 8 MMOL/L (ref 0–11.9)
APPEARANCE UR: CLEAR
AST SERPL-CCNC: 98 U/L (ref 12–45)
BASOPHILS # BLD AUTO: 0.4 % (ref 0–1.8)
BASOPHILS # BLD: 0.05 K/UL (ref 0–0.12)
BILIRUB SERPL-MCNC: 1.1 MG/DL (ref 0.1–1.5)
BILIRUB UR QL STRIP.AUTO: NEGATIVE
BUN SERPL-MCNC: 5 MG/DL (ref 8–22)
CALCIUM SERPL-MCNC: 7.5 MG/DL (ref 8.5–10.5)
CHLORIDE SERPL-SCNC: 102 MMOL/L (ref 96–112)
CO2 SERPL-SCNC: 25 MMOL/L (ref 20–33)
COLOR UR: YELLOW
CREAT SERPL-MCNC: 0.56 MG/DL (ref 0.5–1.4)
EOSINOPHIL # BLD AUTO: 0.01 K/UL (ref 0–0.51)
EOSINOPHIL NFR BLD: 0.1 % (ref 0–6.9)
ERYTHROCYTE [DISTWIDTH] IN BLOOD BY AUTOMATED COUNT: 44.6 FL (ref 35.9–50)
GLOBULIN SER CALC-MCNC: 1.9 G/DL (ref 1.9–3.5)
GLUCOSE SERPL-MCNC: 100 MG/DL (ref 65–99)
GLUCOSE UR STRIP.AUTO-MCNC: NEGATIVE MG/DL
GRAM STN SPEC: NORMAL
HCT VFR BLD AUTO: 30.3 % (ref 37–47)
HGB BLD-MCNC: 10 G/DL (ref 12–16)
IMM GRANULOCYTES # BLD AUTO: 0.09 K/UL (ref 0–0.11)
IMM GRANULOCYTES NFR BLD AUTO: 0.7 % (ref 0–0.9)
KETONES UR STRIP.AUTO-MCNC: 80 MG/DL
LEUKOCYTE ESTERASE UR QL STRIP.AUTO: NEGATIVE
LYMPHOCYTES # BLD AUTO: 0.5 K/UL (ref 1–4.8)
LYMPHOCYTES NFR BLD: 4 % (ref 22–41)
MCH RBC QN AUTO: 30.8 PG (ref 27–33)
MCHC RBC AUTO-ENTMCNC: 33 G/DL (ref 33.6–35)
MCV RBC AUTO: 93.2 FL (ref 81.4–97.8)
MICRO URNS: ABNORMAL
MONOCYTES # BLD AUTO: 1.34 K/UL (ref 0–0.85)
MONOCYTES NFR BLD AUTO: 10.8 % (ref 0–13.4)
NEUTROPHILS # BLD AUTO: 10.46 K/UL (ref 2–7.15)
NEUTROPHILS NFR BLD: 84 % (ref 44–72)
NITRITE UR QL STRIP.AUTO: NEGATIVE
NRBC # BLD AUTO: 0 K/UL
NRBC BLD-RTO: 0 /100 WBC
PH UR STRIP.AUTO: 5 [PH]
PLATELET # BLD AUTO: 238 K/UL (ref 164–446)
PMV BLD AUTO: 9.4 FL (ref 9–12.9)
POTASSIUM SERPL-SCNC: 4.2 MMOL/L (ref 3.6–5.5)
PROT SERPL-MCNC: 4.6 G/DL (ref 6–8.2)
PROT UR QL STRIP: NEGATIVE MG/DL
RBC # BLD AUTO: 3.25 M/UL (ref 4.2–5.4)
RBC UR QL AUTO: NEGATIVE
SIGNIFICANT IND 70042: NORMAL
SITE SITE: NORMAL
SODIUM SERPL-SCNC: 135 MMOL/L (ref 135–145)
SOURCE SOURCE: NORMAL
SP GR UR STRIP.AUTO: 1.02
UROBILINOGEN UR STRIP.AUTO-MCNC: 0.2 MG/DL
WBC # BLD AUTO: 12.5 K/UL (ref 4.8–10.8)

## 2019-02-05 PROCEDURE — 700101 HCHG RX REV CODE 250: Performed by: SURGERY

## 2019-02-05 PROCEDURE — A9270 NON-COVERED ITEM OR SERVICE: HCPCS | Performed by: HOSPITALIST

## 2019-02-05 PROCEDURE — 700111 HCHG RX REV CODE 636 W/ 250 OVERRIDE (IP): Performed by: HOSPITALIST

## 2019-02-05 PROCEDURE — 700101 HCHG RX REV CODE 250: Performed by: INTERNAL MEDICINE

## 2019-02-05 PROCEDURE — 36415 COLL VENOUS BLD VENIPUNCTURE: CPT

## 2019-02-05 PROCEDURE — A9270 NON-COVERED ITEM OR SERVICE: HCPCS | Performed by: SURGERY

## 2019-02-05 PROCEDURE — 99221 1ST HOSP IP/OBS SF/LOW 40: CPT | Performed by: INTERNAL MEDICINE

## 2019-02-05 PROCEDURE — 770001 HCHG ROOM/CARE - MED/SURG/GYN PRIV*

## 2019-02-05 PROCEDURE — 700102 HCHG RX REV CODE 250 W/ 637 OVERRIDE(OP): Performed by: HOSPITALIST

## 2019-02-05 PROCEDURE — 700105 HCHG RX REV CODE 258: Performed by: INTERNAL MEDICINE

## 2019-02-05 PROCEDURE — 80053 COMPREHEN METABOLIC PANEL: CPT

## 2019-02-05 PROCEDURE — 81003 URINALYSIS AUTO W/O SCOPE: CPT

## 2019-02-05 PROCEDURE — 700111 HCHG RX REV CODE 636 W/ 250 OVERRIDE (IP): Performed by: INTERNAL MEDICINE

## 2019-02-05 PROCEDURE — 87070 CULTURE OTHR SPECIMN AEROBIC: CPT

## 2019-02-05 PROCEDURE — 87205 SMEAR GRAM STAIN: CPT

## 2019-02-05 PROCEDURE — 87102 FUNGUS ISOLATION CULTURE: CPT

## 2019-02-05 PROCEDURE — 97165 OT EVAL LOW COMPLEX 30 MIN: CPT

## 2019-02-05 PROCEDURE — A9270 NON-COVERED ITEM OR SERVICE: HCPCS | Performed by: INTERNAL MEDICINE

## 2019-02-05 PROCEDURE — 85025 COMPLETE CBC W/AUTO DIFF WBC: CPT

## 2019-02-05 PROCEDURE — 700102 HCHG RX REV CODE 250 W/ 637 OVERRIDE(OP): Performed by: SURGERY

## 2019-02-05 PROCEDURE — 99233 SBSQ HOSP IP/OBS HIGH 50: CPT | Performed by: HOSPITALIST

## 2019-02-05 PROCEDURE — 700102 HCHG RX REV CODE 250 W/ 637 OVERRIDE(OP): Performed by: INTERNAL MEDICINE

## 2019-02-05 RX ORDER — ECHINACEA PURPUREA EXTRACT 125 MG
2 TABLET ORAL
Status: DISCONTINUED | OUTPATIENT
Start: 2019-02-05 | End: 2019-02-09 | Stop reason: HOSPADM

## 2019-02-05 RX ORDER — LISINOPRIL 20 MG/1
20 TABLET ORAL ONCE
Status: COMPLETED | OUTPATIENT
Start: 2019-02-05 | End: 2019-02-05

## 2019-02-05 RX ORDER — LISINOPRIL 20 MG/1
20 TABLET ORAL DAILY
Status: DISCONTINUED | OUTPATIENT
Start: 2019-02-06 | End: 2019-02-06

## 2019-02-05 RX ADMIN — METRONIDAZOLE 500 MG: 500 INJECTION, SOLUTION INTRAVENOUS at 15:40

## 2019-02-05 RX ADMIN — METRONIDAZOLE 500 MG: 500 INJECTION, SOLUTION INTRAVENOUS at 22:15

## 2019-02-05 RX ADMIN — POLYETHYLENE GLYCOL 3350 1 PACKET: 17 POWDER, FOR SOLUTION ORAL at 05:42

## 2019-02-05 RX ADMIN — SENNOSIDES AND DOCUSATE SODIUM 2 TABLET: 8.6; 5 TABLET ORAL at 17:58

## 2019-02-05 RX ADMIN — POTASSIUM CHLORIDE AND SODIUM CHLORIDE: 900; 300 INJECTION, SOLUTION INTRAVENOUS at 15:40

## 2019-02-05 RX ADMIN — CEFTRIAXONE SODIUM 2 G: 2 INJECTION, POWDER, FOR SOLUTION INTRAMUSCULAR; INTRAVENOUS at 05:42

## 2019-02-05 RX ADMIN — Medication: at 17:50

## 2019-02-05 RX ADMIN — METRONIDAZOLE 500 MG: 500 INJECTION, SOLUTION INTRAVENOUS at 06:23

## 2019-02-05 RX ADMIN — LEVOTHYROXINE SODIUM 100 MCG: 50 TABLET ORAL at 05:42

## 2019-02-05 RX ADMIN — SENNOSIDES AND DOCUSATE SODIUM 2 TABLET: 8.6; 5 TABLET ORAL at 09:31

## 2019-02-05 RX ADMIN — LISINOPRIL 5 MG: 5 TABLET ORAL at 05:42

## 2019-02-05 RX ADMIN — Medication: at 17:59

## 2019-02-05 RX ADMIN — LISINOPRIL 20 MG: 20 TABLET ORAL at 16:30

## 2019-02-05 ASSESSMENT — ENCOUNTER SYMPTOMS
MYALGIAS: 0
NERVOUS/ANXIOUS: 0
WEAKNESS: 1
FALLS: 0
FEVER: 0
BLOOD IN STOOL: 0
WHEEZING: 0
WEAKNESS: 0
DIARRHEA: 0
SHORTNESS OF BREATH: 0
VOMITING: 0
EYE REDNESS: 0
EYE PAIN: 0
HEADACHES: 0
ABDOMINAL PAIN: 1
SEIZURES: 0
CONSTIPATION: 1
TREMORS: 0
HEMOPTYSIS: 0
INSOMNIA: 0
PALPITATIONS: 0
FOCAL WEAKNESS: 0
COUGH: 0
NAUSEA: 0
LOSS OF CONSCIOUSNESS: 0
CHILLS: 0
DIZZINESS: 0

## 2019-02-05 ASSESSMENT — ACTIVITIES OF DAILY LIVING (ADL): TOILETING: INDEPENDENT

## 2019-02-05 NOTE — PROGRESS NOTES
Blood cultures cancelled as just drawn post op yesterday 2/4 by physician Marcos.    Fluid & fungal culture drawn from BRAXTON drain per order and sent down to lab STAT.

## 2019-02-05 NOTE — PROGRESS NOTES
Son Godfrey at bedside.   Daughter left.   PIV fluids started.   PCA verified. Educated on use. Educated that patient should only be pushing and explained how it worked.  Verbalized understanding.   Rating pain 10/10 upon arrival to unit..   Ice pack applied.   prevena to incision, on and suctioning.   BRAXTON with dressing with scant shadowing.  serosang output.   Reported nausea, medicated with zofran.    in use, 3L NC.  Will titrate down.   Educated to call before getting oob.   Call light and belongings within reach.

## 2019-02-05 NOTE — PROGRESS NOTES
Report received from Sindy MCNEILL in pacu.   Patient arrived back to floor via bed.  Daughter at bedside, Dr. BOB updated her over the phone.   PCA running upon admission to floor.

## 2019-02-05 NOTE — PROGRESS NOTES
Hospital Medicine Daily Progress Note    Date of Service  2/5/2019    Chief Complaint  73 y.o. female admitted 2/2/2019 with abdominal pain    Hospital Course    Remote history of biliary surgery at 8yrs old  History of biliary stricture, stricture right above the pancreatic head, obvious with extreme dilation of   the extrahepatic ducts and intrahepatic ducts, several ERCPs most recent 1/31 done by Dr. Moore, GI where   he was able to cannulate through this very tight stricture in the common hepatic duct with upstream dilation of the biliary system with a wire and then placed a fully covered metal stent with a plan  to repeat, it was to obviously bridge this stricture; also follows Dr. Londono, Surgery.   Admitted 1/31 to [facility] for RUQ pain with CT imaging from outlying facility.with biliary obstruction, gallbladder distention.   HIDA scan showed nonvisualization of the gallbladder suggestive of cystic duct obstruction which can be seen in acute cholecystitis.  Dr. Londono, Surgery consulted 2/1,  transferred to [facility] 2/3.  She underwent laparotomy with drainage of peritoneal abscess in subhepatic space, open cholecystectomy for gangrenous cholecystitis and hepatic abscess drainage.      Interval Problem Update  Underwent open cholecystectomy for gangrenous cholecystitis, drainage of liver abscess, and drainage of peritoneal abscess yesterday  I discussed case with Dr. Marcos and plan to cont antibiotics for total of 7-10 days with repeat CT scan prior to d/c antibiotics  Abdominal pain improved and pain is well controlled  BP elevated, increase lisinopril   Has been constipated and received suppository  Is noticing her nose drying up due to oxygen. I discussed with RN and will use humidifier         Consultants/Specialty  GI  Surgery  ID    Code Status  full    Disposition  May need advanced level of care postoperatively  PT/OT ordered.    Review of Systems  Review of Systems    Constitutional: Negative for chills and fever.   HENT: Negative for congestion, hearing loss and nosebleeds.    Eyes: Negative for pain and redness.   Respiratory: Negative for cough, hemoptysis, shortness of breath and wheezing.    Cardiovascular: Negative for chest pain and palpitations.   Gastrointestinal: Positive for abdominal pain and constipation. Negative for blood in stool, diarrhea, nausea and vomiting.   Genitourinary: Negative for dysuria, frequency and hematuria.   Musculoskeletal: Negative for falls, joint pain and myalgias.   Skin: Negative for rash.   Neurological: Negative for dizziness, tremors, focal weakness, seizures, loss of consciousness, weakness and headaches.   Psychiatric/Behavioral: The patient is not nervous/anxious and does not have insomnia.    All other systems reviewed and are negative.       Physical Exam  Temp:  [36.3 °C (97.3 °F)-37.6 °C (99.6 °F)] 36.5 °C (97.7 °F)  Pulse:  [62-77] 64  Resp:  [15-18] 15  BP: (146-167)/(69-80) 167/71  SpO2:  [94 %-98 %] 96 %    Physical Exam   Constitutional: She is oriented to person, place, and time. She appears well-developed.   Thin, elderly     HENT:   Head: Normocephalic and atraumatic.   Eyes: Conjunctivae are normal. No scleral icterus.   Cardiovascular: Normal rate and regular rhythm.    No murmur heard.  Pulmonary/Chest: Effort normal and breath sounds normal. No respiratory distress. She has no wheezes.   Abdominal: Soft. Bowel sounds are normal. She exhibits no distension. There is tenderness (Mild, RUQ). There is no rebound and no guarding.   Musculoskeletal: She exhibits no edema or tenderness.   Neurological: She is alert and oriented to person, place, and time.   Skin: Skin is warm.   Psychiatric: She has a normal mood and affect. Her behavior is normal.   Nursing note and vitals reviewed.      Fluids    Intake/Output Summary (Last 24 hours) at 02/05/19 2030  Last data filed at 02/05/19 2010   Gross per 24 hour   Intake            2281.1 ml   Output              505 ml   Net           1776.1 ml       Laboratory  Recent Labs      02/04/19   0329  02/05/19   0303   WBC  9.5  12.5*   RBC  3.18*  3.25*   HEMOGLOBIN  9.9*  10.0*   HEMATOCRIT  29.5*  30.3*   MCV  92.5  93.2   MCH  30.8  30.8   MCHC  33.3*  33.0*   RDW  43.4  44.6   PLATELETCT  189  238   MPV  9.9  9.4     Recent Labs      02/03/19   1034  02/04/19   0329  02/05/19   0303   SODIUM  131*  133*  135   POTASSIUM  3.3*  3.4*  4.2   CHLORIDE  102  105  102   CO2  24  23  25   GLUCOSE  101*  101*  100*   BUN  5*  5*  5*   CREATININE  0.52  0.58  0.56   CALCIUM  7.6*  7.6*  7.5*     Recent Labs      02/04/19   0329   INR  1.14*               Imaging  No orders to display        Assessment/Plan  * Gangrenous cholecystitis- (present on admission)   Assessment & Plan    2/4 Underwent open cholecystectomy for gangrenous cholecystitis, drainage of liver abscess, and drainage of peritoneal abscess   Improvement in pain   Cont antibiotics     Liver abscess   Assessment & Plan    S/p drainage on 2/4  Cont antibiotics  I discussed case with ID and Will need repeat CT scan prior to d/c antibiotics.      Biliary stricture   Assessment & Plan    Chronic, with possible stent malfunction  As above     Transaminitis and hyperbilirubinemia- (present on admission)   Assessment & Plan    Due to above  Resolving     Anemia   Assessment & Plan    Mild, no active bleeding.  Did have a Hb drop. Likely dilutional  Ordered iron studies.     Constipation   Assessment & Plan    OK to do suppository     Dyslipidemia   Assessment & Plan    Statin held for now because of transaminitis     Hyponatremia- (present on admission)   Assessment & Plan    Mild, stable     Hypothyroid- (present on admission)   Assessment & Plan    Continue levothyroxine     Hypertension- (present on admission)   Assessment & Plan    Stable  On lisinopril          VTE prophylaxis: SCDs

## 2019-02-05 NOTE — CONSULTS
Consults   INFECTIOUS DISEASES INPATIENT CONSULT NOTE     Date of Service: 2/5/2019    Consult Requested By: Torey Donaldson M.D.    Reason for Consultation:     History of Present Illness:   Clementine Hopper is a 73 y.o. admitted 2/2/2019. Pt has a past medical history of biliary obstruction as a child , hypertension, hypothyroidism and hyperlipidemia.  She experienced left upper quadrant abdominal pain beginning in mid December which she describes as acute severe and worsened with eating fatty food .  She was admitted in December for same and MRCP was done which showed a common bile duct dilation.  At the time of ultrasound and workup was not consistent with cholecystitis.  ERCP was done which showed stricture.  CT with pancreatic windows performed which was negative for cholangiocarcinoma.  She was discharged with 5 days of ciprofloxacin.  She was again admitted in January with elevated liver enzymes and right upper quadrant pain concerning for cholecystitis/cholangitis.  On 1/28 ERCP was again done with placement of covered metal stent.  However she continued to have transaminitis abdominal pain and was therefore transferred to [facility] for surgical exploration and intervention.  She is taken to the OR on 2/4.  The plan had been for exploratory laparoscopy however it was converted to open laparotomy.  Patient was found to have peritoneal abscess in the right upper quadrant subhepatic space.  She also was found to have gangrenous cholecystitis with large pocket of purulent material posterior to gallbladder and cholecystectomy was performed.  Hepatectomy and drainage was also performed due 3 cm hepatic abscess.  Intraoperative cultures were obtained.  However only 1 aerobic swab. Drain placed in the right upper quadrant adjacent to our cholecystectomy site, peritoneal abscess, and liver abscess.     TODAY the patient is recovered well from her operation.  She remains afebrile with some somewhat elevated  temperatures to 99.6.  She initially required 3 L nasal cannula oxygen segmentation now down to 1 L.  She started on ceftriaxone and Flagyl on 2/3 and continues to date.  Blood cultures were obtained postoperatively as confirmed with nursing.  Drain continues to have output with bilious/purulent fluid.        73 y.o. admitted 2/2/2019. Pt has a past medical history of biliary obstruction as a child , hypertension, hypothyroidism and hyperlipidemia.  She experienced left upper quadrant abdominal pain beginning in mid December and underwent ERCP.  She continued to have symptoms and again had ERCP 1/28 with metal stent placed.  She continued to have transaminitis abdominal pain and was transferred to [facility] for surgical exploration and intervention.  She is taken to the OR on 2/4.  The plan had been for exploratory laparoscopy however it was converted to open laparotomy.  Patient was found to have peritoneal abscess in the right upper quadrant subhepatic space.  She also was found to have gangrenous cholecystitis with large pocket of purulent material posterior to gallbladder and cholecystectomy was performed.  Hepatectomy and drainage was also performed due 3 cm hepatic abscess.  Intraoperative cultures were obtained.  However only 1 aerobic swab. Drain placed in the right upper quadrant adjacent to our cholecystectomy site, peritoneal abscess, and liver abscess.     Review Of Systems:  Review of Systems   Constitutional: Positive for malaise/fatigue. Negative for chills and fever.   Respiratory: Negative for cough and shortness of breath.    Cardiovascular: Negative for chest pain and leg swelling.   Gastrointestinal: Positive for abdominal pain. Negative for diarrhea, nausea and vomiting.   Musculoskeletal: Negative for myalgias.   Skin: Negative for rash.         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   • Stroke (HCC) 07/2018    suspected small stroke per neurologist, has plaque in carotid- no residual- states on statin for this,1/2019 not taking statin r/t muscle spasms       PSH:  Past Surgical History:   Procedure Laterality Date   • CHOLECYSTECTOMY  2/4/2019    Procedure: CHOLECYSTECTOMY, EXPLORATORY LAPAROTOMY, INTRA-OPERATIVE ULTRASOUND, DRAINAGE OF PERITONEAL ABSCESS;  Surgeon: Pablo Cannon M.D.;  Location: [facility];  Service: General   • ERCP  1/28/2019    Procedure: ERCP- SPYGLASS, POSSIBLE DILATION;  Surgeon: Haroon Moore M.D.;  Location: [facility];  Service: Gastroenterology   • ERCP-DIAGNOSTIC W/BIOPSY  1/28/2019    Procedure: ERCP-DIAGNOSTIC W/BIOPSY;  Surgeon: Haroon Moore M.D.;  Location: [facility];  Service: Gastroenterology   • ERCP W/ INSERTION STENT/TUBE  1/28/2019    Procedure: ERCP W/ INSERTION STENT/TUBE- POSSIBLE BILIARY STENT/METAL STENT (FULLY COVERED);  Surgeon: Haroon Moore M.D.;  Location: [facility];  Service: Gastroenterology   • GASTROSCOPY N/A 1/7/2019    Procedure: GASTROSCOPY;  Surgeon: Cristofer Brandon M.D.;  Location: [facility];  Service: Gastroenterology   • EGD WITH ASP/BX N/A 1/7/2019    Procedure: EGD WITH ASP/BX;  Surgeon: Cristofer Brandon M.D.;  Location: [facility];  Service: Gastroenterology   • ERCP  12/13/2018    Procedure: ERCP;  Surgeon: Cristofer Brandon M.D.;  Location: [facility];  Service: Gastroenterology   • LAPAROTOMY  1953    for clogged bile duct   • APPENDECTOMY  1952    ruptured       FAMILY HX:  Family History   Problem Relation Age of Onset   • Cancer Mother         Head and neck cancer       SOCIAL HX:  Social History      Social History   • Marital status: Single     Spouse name: N/A   • Number of children: N/A   • Years of education: N/A     Occupational History   • Not on file.     Social History Main Topics   • Smoking status: Former Smoker     Quit date: 2000   • Smokeless tobacco: Never Used   • Alcohol use Yes      Comment: 1-2 glasses of wine a night- 1/2/19 none in a month   • Drug use: No   • Sexual activity: Not on file     Other Topics Concern   • Not on file     Social History Narrative   • No narrative on file     History   Smoking Status   • Former Smoker   • Quit date: 2000   Smokeless Tobacco   • Never Used     History   Alcohol Use   • Yes     Comment: 1-2 glasses of wine a night- 1/2/19 none in a month       Allergies/Intolerances:  No Known Allergies    History reviewed with the patient     Other Current Medications:    Current Facility-Administered Medications:   •  levothyroxine (SYNTHROID) tablet 100 mcg, 100 mcg, Oral, DAILY, Pablo Cannon M.D., 100 mcg at 02/05/19 0542  •  Pharmacy Consult Request ...Pain Management Review 1 Each, 1 Each, Other, PHARMACY TO DOSE, Pablo Cannon M.D.  •  morphine 1 mg/mL in 50 mL (PCA), , Intravenous, Continuous, Pablo Cannon M.D.  •  promethazine (PHENERGAN) tablet 25 mg, 25 mg, Oral, Q6HRS PRN, Pablo Cannon M.D.  •  0.9 % NaCl with KCl 40 mEq 1,000 mL, , Intravenous, Continuous, Pablo Cannon M.D., Last Rate: 100 mL/hr at 02/04/19 1654  •  NS (BOLUS) infusion 500 mL, 500 mL, Intravenous, Once PRN, Jaron Smith M.D.  •  ondansetron (ZOFRAN) syringe/vial injection 4 mg, 4 mg, Intravenous, Q4HRS PRN, Shauna Salguero M.D., 4 mg at 02/04/19 1713  •  senna-docusate (PERICOLACE or SENOKOT S) 8.6-50 MG per tablet 2 Tab, 2 Tab, Oral, BID, 2 Tab at 02/04/19 0553 **AND** polyethylene glycol/lytes (MIRALAX) PACKET 1 Packet, 1 Packet, Oral, DAILY, 1 Packet at 02/05/19 0542 **AND** magnesium hydroxide (MILK OF  "MAGNESIA) suspension 30 mL, 30 mL, Oral, QDAY PRN **AND** bisacodyl (DULCOLAX) suppository 10 mg, 10 mg, Rectal, QDAY PRN, Shauna Salguero M.D., 10 mg at 19 0848  •  cefTRIAXone (ROCEPHIN) 2 g in  mL IVPB, 2 g, Intravenous, Q24HRS, Shauna Salguero M.D., Stopped at 19 0612  •  lisinopril (PRINIVIL) tablet 5 mg, 5 mg, Oral, DAILY, Shauna Salguero M.D., 5 mg at 19 0542  •  metroNIDAZOLE (FLAGYL) IVPB 500 mg, 500 mg, Intravenous, Q8HRS, Shauna Salguero M.D., Stopped at 19 0723  [unfilled]    Most Recent Vital Signs:  /69   Pulse 63   Temp 37.6 °C (99.6 °F) (Temporal)   Resp 17   Ht 1.6 m (5' 3\")   Wt 54.5 kg (120 lb 2.4 oz)   LMP  (LMP Unknown)   SpO2 98%   Breastfeeding? No   BMI 21.28 kg/m²   Temp  Av.1 °C (98.7 °F)  Min: 36.3 °C (97.3 °F)  Max: 37.6 °C (99.6 °F)    Physical Exam:  Physical Exam   Constitutional: She is oriented to person, place, and time and well-developed, well-nourished, and in no distress.   HENT:   Head: Normocephalic and atraumatic.   Cardiovascular: Normal rate, regular rhythm and normal heart sounds.    Pulmonary/Chest: Effort normal and breath sounds normal. No respiratory distress. She has no wheezes. She has no rales. She exhibits no tenderness.   Frontal auscultation only   Abdominal: Soft. She exhibits no distension. There is tenderness. There is no rebound and no guarding.   Abdominal wound VAC in place, right upper quadrant drain in place with thick bilious fluid   Musculoskeletal: Normal range of motion.   Neurological: She is alert and oriented to person, place, and time.   Skin: Skin is warm and dry.   Psychiatric: Affect and judgment normal.         Pertinent Lab Results:  Recent Labs      19   0329  19   0303   WBC  9.5  12.5*      Recent Labs      19   0329  19   0303   HEMOGLOBIN  9.9*  10.0*   HEMATOCRIT  29.5*  30.3*   MCV  92.5  93.2   MCH  30.8  30.8   PLATELETCT  189  " "238         Recent Labs      02/03/19   1034  02/04/19   0329  02/05/19   0303   SODIUM  131*  133*  135   POTASSIUM  3.3*  3.4*  4.2   CHLORIDE  102  105  102   CO2  24  23  25   CREATININE  0.52  0.58  0.56        Recent Labs      02/03/19   1034  02/04/19   0329  02/05/19   0303   ALBUMIN  2.6*  2.5*  2.7*        Pertinent Micro:  Results     Procedure Component Value Units Date/Time    Blood Culture [[id number]] Collected:  02/04/19 1703    Order Status:  Completed Specimen:  Blood from Peripheral Updated:  02/05/19 0850     Significant Indicator NEG     Source BLD     Site PERIPHERAL     Blood Culture No Growth    Note: Blood cultures are incubated for 5 days and  are monitored continuously.Positive blood cultures  are called to the RN and reported as soon as  they are identified.      Narrative:       Per Hospital Policy: Only change Specimen Src: to \"Line\" if  specified by physician order.    Blood Culture [[id number]] Collected:  02/04/19 1703    Order Status:  Completed Specimen:  Blood from Peripheral Updated:  02/05/19 0850     Significant Indicator NEG     Source BLD     Site PERIPHERAL     Blood Culture No Growth    Note: Blood cultures are incubated for 5 days and  are monitored continuously.Positive blood cultures  are called to the RN and reported as soon as  they are identified.      Narrative:       Per Hospital Policy: Only change Specimen Src: to \"Line\" if  specified by physician order.    Urinalysis [[id number]]  (Abnormal) Collected:  02/05/19 0117    Order Status:  Completed Specimen:  Urine from Urine, Clean Catch Updated:  02/05/19 0126     Color Yellow     Character Clear     Specific Gravity 1.018     Ph 5.0     Glucose Negative mg/dL      Ketones 80 (A) mg/dL      Protein Negative mg/dL      Bilirubin Negative     Urobilinogen, Urine 0.2     Nitrite Negative     Leukocyte Esterase Negative     Occult Blood Negative     Micro Urine Req see below     Comment: Microscopic examination not " performed when specimen is clear  and chemically negative for protein, blood, leukocyte esterase  and nitrite.         Narrative:       Collected By:[id number] NIKKY BOYKIN  If not done within the last 24 hours    GRAM STAIN [[id number]] Collected:  02/04/19 1345    Order Status:  Completed Specimen:  Wound Updated:  02/04/19 1724     Significant Indicator .     Source WND     Site Billiary     Gram Stain Result No organisms seen.    Narrative:       Anaerobic glass swab sent down to lab broken, only used Aerobic swab for  culture.    ANAEROBIC CULTURE [[id number]] Collected:  02/04/19 1345    Order Status:  Completed Specimen:  Other Updated:  02/04/19 1616    Narrative:       Anaerobic glass swab sent down to lab broken, only used Aerobic swab for  culture.    CULTURE WOUND W/ GRAM STAIN [[id number]] Collected:  02/04/19 1345    Order Status:  Completed Specimen:  Other Updated:  02/04/19 1615    Narrative:       Anaerobic glass swab sent down to lab broken, only used Aerobic swab for  culture.    Blood Culture [[id number]]     Order Status:  Canceled Specimen:  Blood from Peripheral     Blood Culture [[id number]]     Order Status:  Canceled Specimen:  Blood from Peripheral         Blood Culture   Date Value Ref Range Status   02/04/2019   Preliminary    No Growth    Note: Blood cultures are incubated for 5 days and  are monitored continuously.Positive blood cultures  are called to the RN and reported as soon as  they are identified.     02/04/2019   Preliminary    No Growth    Note: Blood cultures are incubated for 5 days and  are monitored continuously.Positive blood cultures  are called to the RN and reported as soon as  they are identified.          Studies:  Bv-[id number] - Panc Stent - Tube    Result Date: 1/28/2019 1/28/2019 8:18 AM HISTORY/REASON FOR EXAM:  Common bile duct stricture. Stent placement TECHNIQUE/EXAM DESCRIPTION AND NUMBER OF VIEWS: Fluoroscopic images obtained during ERCP COMPARISON: Pancreatic  CT 12/14/2018 FINDINGS: Fluoroscopic images obtained during pancreatic CT for stent placement. Number of images: 11. Fluoroscopy time: 1.3 minutes     Status post common bile duct stent placement    Nm-hepatobiliary Scan    Result Date: 2/1/2019 2/1/2019 11:40 AM HISTORY/REASON FOR EXAM: Cholecystitis TECHNIQUE/EXAM DESCRIPTION AND NUMBER OF VIEWS: Hepatobiliary scan. COMPARISON: 1/28/2019 PROCEDURE:  5.3 mCi Tc 99m-Choletec was administered intravenously, followed by 55 minutes of anterior planar imaging. FINDINGS: Tracer is seen within the liver. There is excretion of tracer into the common duct and small bowel. The gallbladder was not visualized during initial 55 minutes of imaging. The patient was unable to continue with further imaging and was brought back 4 hours later for delayed images. Nonvisualization of the gallbladder on 4 hour delayed images is suggestive of cystic duct obstruction.     Nonvisualization of the gallbladder suggestive of cystic duct obstruction which can be seen in acute cholecystitis.     Dx-portable Fluoroscopy < 1 Hour    Result Date: 1/28/2019 1/28/2019 8:18 AM HISTORY/REASON FOR EXAM:  ERCP with stent placement, Ifrah See TECHNIQUE/EXAM DESCRIPTION AND NUMBER OF VIEWS: Portable fluoroscopy for less than one hour FINDINGS:      The portable fluoroscopy unit was obligated to the procedure for less than one hour.   Actual fluoro time was 1.3 minutes.     Portable fluoroscopy utilized for 1.3 minutes.       ASSESSMENT:       73 y.o. admitted 2/2/2019. Pt has a past medical history of biliary obstruction as a child , hypertension, hypothyroidism and hyperlipidemia.  She experienced left upper quadrant abdominal pain beginning in mid December and underwent ERCP.  She continued to have symptoms and again had ERCP 1/28 with metal stent placed.  She continued to have transaminitis abdominal pain and was transferred to [facility] for surgical exploration and intervention.  She is taken to the  OR on 2/4.  The plan had been for exploratory laparoscopy however it was converted to open laparotomy.  Patient was found to have peritoneal abscess in the right upper quadrant subhepatic space.  She also was found to have gangrenous cholecystitis with large pocket of purulent material posterior to gallbladder and cholecystectomy was performed.  Hepatectomy and drainage was also performed due 3 cm hepatic abscess.  Intraoperative cultures were obtained.  However only 1 aerobic swab. Drain placed in the right upper quadrant adjacent to our cholecystectomy site, peritoneal abscess, and liver abscess.     Problems     Peritoneal abscess, right upper quadrant subhepatic space near gallbladder with pocket pf purulent fluid   Hepatic abscess, also with purulent fluid   Gangrenous cholecystitis   Leukocytosis peaked at 17 on 2/1  Elevated liver enzymes, cholestatic pattern        PLAN:     Continue ceftriaxone and Flagyl  Will plan on at least 7-10 days of antibiotics with repeat CT abdomen pelvis prior to discontinuation  Follow-up OR culture (1 swab only)   Will obtain additional cultures from drain.  Drain placed less than 24 hours ago and results should still be valid. Ordered and discussed with nursing.   Follow-up post-op blood cultures, no growth to date   The metal stent will potentially be a nidus of infection, would recommend removal as feasible, taking into account patient's need for biliary drainage      Plan of care discussed with MARISSA Donaldson M.D.. Will continue to follow    Eunice Marcos M.D.

## 2019-02-05 NOTE — PROGRESS NOTES
Surgical Progress Note    Author: Pablo Cannon Date & Time created: 2019   7:44 AM     Interval Events:  Postop day 1 status post open cholecystectomy for gangrenous cholecystitis, hepatectomy for liver abscess, and.  Drainage of peritoneal abscess.  She does appear to be doing extremely well.  Her pain is well controlled.  She tolerated clear liquid diet and is ambulating.  Her BRAXTON drainage is serosanguineous.  Review of Systems   Gastrointestinal: Positive for abdominal pain.   Neurological: Positive for weakness.   All other systems reviewed and are negative.    Hemodynamics:  Temp (24hrs), Av.1 °C (98.7 °F), Min:36.3 °C (97.3 °F), Max:37.6 °C (99.6 °F)  Temperature: 37.6 °C (99.6 °F)  Pulse  Av  Min: 56  Max: 80Heart Rate (Monitored): 62  Blood Pressure : 146/69, NIBP: 159/60     Respiratory:    Respiration: 17, Pulse Oximetry: 98 %           Neuro:  GCS       Fluids:    Intake/Output Summary (Last 24 hours) at 19 0744  Last data filed at 19 0700   Gross per 24 hour   Intake          1815.05 ml   Output              990 ml   Net           825.05 ml        Current Diet Order   Procedures   • Diet Order Low Fiber(GI Soft)     Physical Exam   Constitutional: She is oriented to person, place, and time.   HENT:   Head: Normocephalic and atraumatic.   Eyes: Pupils are equal, round, and reactive to light. Conjunctivae are normal.   Neck: Normal range of motion. Neck supple.   Cardiovascular: Normal rate and regular rhythm.    Pulmonary/Chest: Effort normal and breath sounds normal.   Abdominal: Soft. Bowel sounds are normal. There is tenderness.   BRAXTON drain has serosanguineous fluid no obvious bile.   Musculoskeletal: Normal range of motion.   Neurological: She is alert and oriented to person, place, and time.   Skin: Skin is warm and dry.   Nursing note and vitals reviewed.    Labs:  Recent Results (from the past 24 hour(s))   GRAM STAIN    Collection Time: 19  1:45 PM   Result  Value Ref Range    Significant Indicator .     Source WND     Site Billiary     Gram Stain Result No organisms seen.    HISTOLOGY REQUEST    Collection Time: 02/04/19  3:39 PM   Result Value Ref Range    Pathology Request Sent to [facility]    IRON/TOTAL IRON BIND    Collection Time: 02/04/19  5:03 PM   Result Value Ref Range    Iron 65 40 - 170 ug/dL    Total Iron Binding 172 (L) 250 - 450 ug/dL    % Saturation 38 15 - 55 %   FERRITIN    Collection Time: 02/04/19  5:03 PM   Result Value Ref Range    Ferritin 967.5 (H) 10.0 - 291.0 ng/mL   Lactic Acid -STAT Once    Collection Time: 02/04/19  5:03 PM   Result Value Ref Range    Lactic Acid 1.1 0.5 - 2.0 mmol/L   Urinalysis    Collection Time: 02/05/19  1:17 AM   Result Value Ref Range    Color Yellow     Character Clear     Specific Gravity 1.018 <1.035    Ph 5.0 5.0 - 8.0    Glucose Negative Negative mg/dL    Ketones 80 (A) Negative mg/dL    Protein Negative Negative mg/dL    Bilirubin Negative Negative    Urobilinogen, Urine 0.2 Negative    Nitrite Negative Negative    Leukocyte Esterase Negative Negative    Occult Blood Negative Negative    Micro Urine Req see below    COMP METABOLIC PANEL    Collection Time: 02/05/19  3:03 AM   Result Value Ref Range    Sodium 135 135 - 145 mmol/L    Potassium 4.2 3.6 - 5.5 mmol/L    Chloride 102 96 - 112 mmol/L    Co2 25 20 - 33 mmol/L    Anion Gap 8.0 0.0 - 11.9    Glucose 100 (H) 65 - 99 mg/dL    Bun 5 (L) 8 - 22 mg/dL    Creatinine 0.56 0.50 - 1.40 mg/dL    Calcium 7.5 (L) 8.5 - 10.5 mg/dL    AST(SGOT) 98 (H) 12 - 45 U/L    ALT(SGPT) 71 (H) 2 - 50 U/L    Alkaline Phosphatase 226 (H) 30 - 99 U/L    Total Bilirubin 1.1 0.1 - 1.5 mg/dL    Albumin 2.7 (L) 3.2 - 4.9 g/dL    Total Protein 4.6 (L) 6.0 - 8.2 g/dL    Globulin 1.9 1.9 - 3.5 g/dL    A-G Ratio 1.4 g/dL   CBC WITH DIFFERENTIAL    Collection Time: 02/05/19  3:03 AM   Result Value Ref Range    WBC 12.5 (H) 4.8 - 10.8 K/uL    RBC 3.25 (L) 4.20 - 5.40 M/uL    Hemoglobin 10.0 (L)  12.0 - 16.0 g/dL    Hematocrit 30.3 (L) 37.0 - 47.0 %    MCV 93.2 81.4 - 97.8 fL    MCH 30.8 27.0 - 33.0 pg    MCHC 33.0 (L) 33.6 - 35.0 g/dL    RDW 44.6 35.9 - 50.0 fL    Platelet Count 238 164 - 446 K/uL    MPV 9.4 9.0 - 12.9 fL    Neutrophils-Polys 84.00 (H) 44.00 - 72.00 %    Lymphocytes 4.00 (L) 22.00 - 41.00 %    Monocytes 10.80 0.00 - 13.40 %    Eosinophils 0.10 0.00 - 6.90 %    Basophils 0.40 0.00 - 1.80 %    Immature Granulocytes 0.70 0.00 - 0.90 %    Nucleated RBC 0.00 /100 WBC    Neutrophils (Absolute) 10.46 (H) 2.00 - 7.15 K/uL    Lymphs (Absolute) 0.50 (L) 1.00 - 4.80 K/uL    Monos (Absolute) 1.34 (H) 0.00 - 0.85 K/uL    Eos (Absolute) 0.01 0.00 - 0.51 K/uL    Baso (Absolute) 0.05 0.00 - 0.12 K/uL    Immature Granulocytes (abs) 0.09 0.00 - 0.11 K/uL    NRBC (Absolute) 0.00 K/uL   ESTIMATED GFR    Collection Time: 02/05/19  3:03 AM   Result Value Ref Range    GFR If African American >60 >60 mL/min/1.73 m 2    GFR If Non African American >60 >60 mL/min/1.73 m 2     Medical Decision Making, by Problem:  Active Hospital Problems    Diagnosis   • Biliary stricture [K83.1]     Priority: High   • Cholecystitis [K81.9]     Priority: High   • Transaminitis and hyperbilirubinemia [R74.0]     Priority: High   • Biliary obstruction [K83.1]     Priority: High   • Constipation [K59.00]   • Anemia [D64.9]   • Dyslipidemia [E78.5]   • Hyponatremia [E87.1]   • Hypothyroid [E03.9]   • Hypertension [I10]     Plan:  General diet, decrease IV fluids.  Continue IV antibiotics for hepatic and peritoneal abscess.  She appears to be doing extremely well.    Quality Measures:  Quality-Core Measures    Discussed patient condition with Family and RN

## 2019-02-05 NOTE — PROGRESS NOTES
A/Ox 4.  VSS.  Reports moderate to severe abdominal pain 6 /10, medicated per MAR, morphine PCA in place, ice applied, splinting education provided.    Abdomen incisions, dressings CDI.  Abdominal prevena with gauze, saturated, will monitor.  RLQ BRAXTON, small serosanguineous output, dressing CDI.  +hypoactive BSX4, denies flatus, LBM 02/04, denies n/v.  Abdomen soft, tender, no distention or discoloration noted.  Poor PO intake, tolerating sips and ice chips, refusing to eat.    + DOMINGO, denies numbness and tingling, generalized weakness.  Weaned to 1L NC, satting >90%, denies SOB or difficulty breathing, IS used appropriately.  +Generalized BLE swelling, SCDs in place.  + void, QS.  Up with x1 assist and FWW, tolerates activity well.  Up to BR, repositions self frequently.  IVF and PCA to PIV, PO intake encouraged.    Call light within reach, calls appropriately.  Bed in lowest locked position.

## 2019-02-05 NOTE — PROGRESS NOTES
Report received from night RN, assumed Care.   Patient is AOx4, responds appropriately.      Pain controlled at this time with PCA, patient states that she feels a bit drowsy with a cloudy mind, encouraged to decrease boluses as basal rate is running. Rating pain increased only when moving.  Tolerable when in bed.  Patient is tolerating small bites of GI soft diet, denies nausea/vomiting. + flatus  Incision with prevena.   BRAXTON with dressing, serosang drainage in bulb.  1L NC,  in use.  Encouraged IS use  Up with FWW x1 assist.   Son Godfrey at bedside.  Patients son requesting to let patient rest this AM.  Check with nurse before entering sign placed.  Educated that MD's will need to round this AM still.    Plan of care discussed, all questions answered.    Explained importance of calling before getting OOB and pt verbalizes understanding.       Call light and belongings within reach, treaded slipper socks on, SCD in use, bed in lowest locked position.  Hourly rounding in place, all needs met at this time

## 2019-02-05 NOTE — OP REPORT
DATE OF SERVICE:  02/04/2019    INDICATIONS:  Patient is a 73-year-old patient known to me who was found to   have a biliary stricture and multiple cholangitis attacks in the past.  She   underwent a stent placed by Dr. Moore.  Following the stent placement, which is   a walled stent, she developed severe cholecystitis and HIDA scan showed no   filling of the gallbladder.  Patient did show essentially bilirubin elevated   up to 3.9, but this morning came down to 1.4, so it was felt the stent was   starting to work.  In any event, the patient was scheduled for a laparoscopic   possible open cholecystectomy.    SURGEON:  Pablo Cannon MD    ASSISTANT:  None.    ANESTHESIOLOGIST:  José Miguel Ley MD    ANESTHESIA:  Local and general anesthetic.    ESTIMATED BLOOD LOSS:  Approximately 250 mL    FINDINGS:  The patient was found to have actually a peritoneal abscess in the   right upper quadrant between the transverse colon, hepatic flexure of the   colon and the gallbladder.  Second finding was the patient was found to have   gangrene cholecystitis.  Next finding was intraparenchymal liver abscess near   the site of the cystic plate.    PROCEDURES DONE:  1.  Exploratory laparoscopy.  2.  Conversion to a laparotomy.  3.  Drainage of peritoneal abscess in the right upper quadrant subhepatic   space.  4.  Open cholecystectomy for gangrenous cholecystitis.  5.  Hepatotomy and drainage of a small 3-4 cm hepatic abscess   intraparenchymally near the site of the cystic plate.  6.  We did intraoperative ultrasound survey of the liver, gallbladder, biliary   system and biliary stent.    SPECIMEN:  Gallbladder and cultures.    DESCRIPTION OF PROCEDURE:  The patient was brought to OR, placed under general   anesthetic, prepped with chlorhexidine, covered with sterile drapes, given   preoperative antibiotics.  Time-out was done, which was adequate.  At that   point, she was given 5 mL of 0.5% Marcaine with epinephrine in the  area of the   inferior umbilicus and a curvilinear incision was made with #11 blade and   between 2 Kochers and Frances, the abdomen was opened under direct vision   atraumatically.  A 0 Vicryl suture placed on each side of the fascia.  A   Abigail trocar was inserted and on insufflating the abdomen, patient was found   to have massive adhesion, an inflammatory mass in the right upper quadrant.    We could not identify and visualize the liver.  It appeared as though it   involved the stomach, most likely part of the duodenum, transverse mesocolon,   transverse colon, and the area where the gallbladder should be.  Because of   the intensity of the inflammatory changes as well as our inability and   firmness, we elected to convert to an open procedure.  We then used the   patient's old subcostal scar from her operation when she was 8 years old for   some biliary disease using a #10 blade and Bovie electrocautery.  Upon   entering the abdomen, we encountered a large mass inflamed in the right upper   quadrant.  At that point, we used the Omni retractor, opened the incision,   near full.  After ultrasound, we identified a large phlegmonous mass in the   right upper quadrant consistent with gallbladder, some peritoneal tumor that   was loculated as well as what appeared to be intrahepatic spread of this   abscess.  We then took down the area of the duodenum and the transverse   mesocolon and continued this dissection all the way around until posterior and   inferior to the gallbladder we encountered a large purulent pocket.  We   irrigated with copious amounts of fluid, suctioned this out and got cultures   and sent those off separately.  We then continued our debulking and   debridement of this area until we were able to identify the gallbladder.    Gallbladder was immediately identified and found to have several areas of   necrosis along the anterior surface on the organ side, not on the liver side.    At that point, we  were able to pull the gallbladder out and retract away the   transverse colon and duodenum and stomach.  A phlegmonous mass gallbladder   area was attached to the segment 4B and 5.  We then entered in a dome down   technique, continued dissection anteriorly along the liver parenchyma.  We   then got a small burst of purulent fluid from the liver parenchyma and   continued to open this area until we encountered a small liver abscess.  This   was on the opposite side of the cystic plate.  We did intraoperative   ultrasound for a second time and evaluated this was not near the main portal   pedicle, but in the parenchyma.  We irrigated that with copious amounts of   fluid.  We then returned back the cystic plate and continued dissection all   the way down to what we felt was the cystic duct and cystic artery.  Because   of the osorio thickness of this area and inability to dissect free the cystic   duct, we then transected the cystic duct and cystic artery with an Endo-GARDENIA 45   white load x2.  The specimen was then sent off to pathology, but it was   opened at the bedside to make sure there was no malignancy.  We did find   multiple areas of necrosis and a cystic duct.  No sign of a common duct or   common duct stones and/or common duct stent.  We then re-ultrasounded the   region and could identify the common duct stent and it was far away from where   we were working by several centimeters.  We traced it down and did see the   stricture that it could traverse.  We irrigated with approximately 2 L of hot   water, reinspected.  We used the TissueLink on the liver parenchyma to seal   any oozing, but also there were some small minute biliary leak in the region   of the parenchyma, most likely secondary to the fact that the patient did have   inadvertent obstructive jaundice from a stent or from the biliary stricture.    We did do the TissueLink and once completion of the TissueLink, there was no   sign of bile leak or  bleeding.  We irrigated again with copious amounts of   fluid, placed a dry Ray-Gilma in the right upper quadrant, allowed it to sit for   approximately 5 minutes by the clock, until there was no bile or bleeding.    At that point, we placed a 19-Sami round Deshaun drain in the right upper   quadrant adjacent to our cholecystectomy site, peritoneal abscess, and liver   abscess; secured with 2-0 nylon in the right upper quadrant.  At that point,   all needle, sponge count, and instrument counts were correct.  We then closed   the abdomen in a single layer through her old scar with a looped PDS starting   laterally to the right as well superiorly along her vertical incision portion   and in the middle tied it.  Once that was completed, we irrigated with copious   amounts of fluid and we then closed with staples.  We placed Prevena in the   area.  The patient was then extubated and transferred to recovery.       ____________________________________     MD MONTANA Tapia / LAVELLE    DD:  02/04/2019 15:03:14  DT:  02/04/2019 16:42:09    D#:  [id number]  Job#:  [id number]

## 2019-02-06 LAB
ALBUMIN SERPL BCP-MCNC: 2.6 G/DL (ref 3.2–4.9)
ALBUMIN/GLOB SERPL: 1.4 G/DL
ALP SERPL-CCNC: 180 U/L (ref 30–99)
ALT SERPL-CCNC: 56 U/L (ref 2–50)
ANION GAP SERPL CALC-SCNC: 5 MMOL/L (ref 0–11.9)
AST SERPL-CCNC: 50 U/L (ref 12–45)
BASOPHILS # BLD AUTO: 0.2 % (ref 0–1.8)
BASOPHILS # BLD: 0.02 K/UL (ref 0–0.12)
BILIRUB SERPL-MCNC: 1 MG/DL (ref 0.1–1.5)
BUN SERPL-MCNC: 5 MG/DL (ref 8–22)
CALCIUM SERPL-MCNC: 7.6 MG/DL (ref 8.5–10.5)
CHLORIDE SERPL-SCNC: 98 MMOL/L (ref 96–112)
CO2 SERPL-SCNC: 25 MMOL/L (ref 20–33)
CREAT SERPL-MCNC: 0.45 MG/DL (ref 0.5–1.4)
EOSINOPHIL # BLD AUTO: 0.05 K/UL (ref 0–0.51)
EOSINOPHIL NFR BLD: 0.5 % (ref 0–6.9)
ERYTHROCYTE [DISTWIDTH] IN BLOOD BY AUTOMATED COUNT: 44.1 FL (ref 35.9–50)
GLOBULIN SER CALC-MCNC: 1.9 G/DL (ref 1.9–3.5)
GLUCOSE SERPL-MCNC: 110 MG/DL (ref 65–99)
HCT VFR BLD AUTO: 28.5 % (ref 37–47)
HGB BLD-MCNC: 9.6 G/DL (ref 12–16)
IMM GRANULOCYTES # BLD AUTO: 0.1 K/UL (ref 0–0.11)
IMM GRANULOCYTES NFR BLD AUTO: 0.9 % (ref 0–0.9)
LYMPHOCYTES # BLD AUTO: 0.85 K/UL (ref 1–4.8)
LYMPHOCYTES NFR BLD: 7.7 % (ref 22–41)
MCH RBC QN AUTO: 31.3 PG (ref 27–33)
MCHC RBC AUTO-ENTMCNC: 33.7 G/DL (ref 33.6–35)
MCV RBC AUTO: 92.8 FL (ref 81.4–97.8)
MONOCYTES # BLD AUTO: 1.3 K/UL (ref 0–0.85)
MONOCYTES NFR BLD AUTO: 11.7 % (ref 0–13.4)
NEUTROPHILS # BLD AUTO: 8.79 K/UL (ref 2–7.15)
NEUTROPHILS NFR BLD: 79 % (ref 44–72)
NRBC # BLD AUTO: 0 K/UL
NRBC BLD-RTO: 0 /100 WBC
PLATELET # BLD AUTO: 234 K/UL (ref 164–446)
PMV BLD AUTO: 9.2 FL (ref 9–12.9)
POTASSIUM SERPL-SCNC: 4 MMOL/L (ref 3.6–5.5)
PROT SERPL-MCNC: 4.5 G/DL (ref 6–8.2)
RBC # BLD AUTO: 3.07 M/UL (ref 4.2–5.4)
SODIUM SERPL-SCNC: 128 MMOL/L (ref 135–145)
WBC # BLD AUTO: 11.1 K/UL (ref 4.8–10.8)

## 2019-02-06 PROCEDURE — 770001 HCHG ROOM/CARE - MED/SURG/GYN PRIV*

## 2019-02-06 PROCEDURE — 80053 COMPREHEN METABOLIC PANEL: CPT

## 2019-02-06 PROCEDURE — 700105 HCHG RX REV CODE 258: Performed by: INTERNAL MEDICINE

## 2019-02-06 PROCEDURE — 700111 HCHG RX REV CODE 636 W/ 250 OVERRIDE (IP): Performed by: INTERNAL MEDICINE

## 2019-02-06 PROCEDURE — 700101 HCHG RX REV CODE 250: Performed by: SURGERY

## 2019-02-06 PROCEDURE — 700101 HCHG RX REV CODE 250: Performed by: INTERNAL MEDICINE

## 2019-02-06 PROCEDURE — 700102 HCHG RX REV CODE 250 W/ 637 OVERRIDE(OP): Performed by: INTERNAL MEDICINE

## 2019-02-06 PROCEDURE — 97161 PT EVAL LOW COMPLEX 20 MIN: CPT

## 2019-02-06 PROCEDURE — 99232 SBSQ HOSP IP/OBS MODERATE 35: CPT | Performed by: INTERNAL MEDICINE

## 2019-02-06 PROCEDURE — A9270 NON-COVERED ITEM OR SERVICE: HCPCS | Performed by: INTERNAL MEDICINE

## 2019-02-06 PROCEDURE — 700102 HCHG RX REV CODE 250 W/ 637 OVERRIDE(OP): Performed by: HOSPITALIST

## 2019-02-06 PROCEDURE — A9270 NON-COVERED ITEM OR SERVICE: HCPCS | Performed by: HOSPITALIST

## 2019-02-06 PROCEDURE — 302299 SYSTEM PREVENA INCISION MNGM: Performed by: HOSPITALIST

## 2019-02-06 PROCEDURE — 36415 COLL VENOUS BLD VENIPUNCTURE: CPT

## 2019-02-06 PROCEDURE — A9270 NON-COVERED ITEM OR SERVICE: HCPCS | Performed by: SURGERY

## 2019-02-06 PROCEDURE — 700102 HCHG RX REV CODE 250 W/ 637 OVERRIDE(OP): Performed by: SURGERY

## 2019-02-06 PROCEDURE — 99232 SBSQ HOSP IP/OBS MODERATE 35: CPT | Performed by: HOSPITALIST

## 2019-02-06 PROCEDURE — 97535 SELF CARE MNGMENT TRAINING: CPT

## 2019-02-06 PROCEDURE — 85025 COMPLETE CBC W/AUTO DIFF WBC: CPT

## 2019-02-06 RX ORDER — CELECOXIB 100 MG/1
100 CAPSULE ORAL DAILY
Status: DISCONTINUED | OUTPATIENT
Start: 2019-02-06 | End: 2019-02-09 | Stop reason: HOSPADM

## 2019-02-06 RX ORDER — OXYCODONE HYDROCHLORIDE AND ACETAMINOPHEN 5; 325 MG/1; MG/1
1-2 TABLET ORAL EVERY 4 HOURS PRN
Status: DISCONTINUED | OUTPATIENT
Start: 2019-02-06 | End: 2019-02-09 | Stop reason: HOSPADM

## 2019-02-06 RX ORDER — HYDROMORPHONE HYDROCHLORIDE 2 MG/ML
2 INJECTION, SOLUTION INTRAMUSCULAR; INTRAVENOUS; SUBCUTANEOUS
Status: DISCONTINUED | OUTPATIENT
Start: 2019-02-06 | End: 2019-02-09 | Stop reason: HOSPADM

## 2019-02-06 RX ORDER — OMEPRAZOLE 20 MG/1
20 CAPSULE, DELAYED RELEASE ORAL DAILY
Status: DISCONTINUED | OUTPATIENT
Start: 2019-02-06 | End: 2019-02-09 | Stop reason: HOSPADM

## 2019-02-06 RX ORDER — LISINOPRIL 20 MG/1
40 TABLET ORAL DAILY
Status: DISCONTINUED | OUTPATIENT
Start: 2019-02-07 | End: 2019-02-09 | Stop reason: HOSPADM

## 2019-02-06 RX ADMIN — OXYCODONE AND ACETAMINOPHEN 1 TABLET: 5; 325 TABLET ORAL at 21:18

## 2019-02-06 RX ADMIN — CEFTRIAXONE SODIUM 2 G: 2 INJECTION, POWDER, FOR SOLUTION INTRAMUSCULAR; INTRAVENOUS at 05:59

## 2019-02-06 RX ADMIN — METRONIDAZOLE 500 MG: 500 INJECTION, SOLUTION INTRAVENOUS at 14:53

## 2019-02-06 RX ADMIN — POTASSIUM CHLORIDE AND SODIUM CHLORIDE: 900; 300 INJECTION, SOLUTION INTRAVENOUS at 05:00

## 2019-02-06 RX ADMIN — SENNOSIDES AND DOCUSATE SODIUM 2 TABLET: 8.6; 5 TABLET ORAL at 05:59

## 2019-02-06 RX ADMIN — METRONIDAZOLE 500 MG: 500 INJECTION, SOLUTION INTRAVENOUS at 21:18

## 2019-02-06 RX ADMIN — SENNOSIDES AND DOCUSATE SODIUM 2 TABLET: 8.6; 5 TABLET ORAL at 17:49

## 2019-02-06 RX ADMIN — CELECOXIB 100 MG: 100 CAPSULE ORAL at 09:30

## 2019-02-06 RX ADMIN — POLYETHYLENE GLYCOL 3350 1 PACKET: 17 POWDER, FOR SOLUTION ORAL at 05:59

## 2019-02-06 RX ADMIN — ONDANSETRON 4 MG: 2 INJECTION INTRAMUSCULAR; INTRAVENOUS at 09:30

## 2019-02-06 RX ADMIN — LISINOPRIL 20 MG: 20 TABLET ORAL at 06:00

## 2019-02-06 RX ADMIN — OMEPRAZOLE 20 MG: 20 CAPSULE, DELAYED RELEASE ORAL at 09:30

## 2019-02-06 RX ADMIN — LEVOTHYROXINE SODIUM 100 MCG: 50 TABLET ORAL at 05:59

## 2019-02-06 RX ADMIN — METRONIDAZOLE 500 MG: 500 INJECTION, SOLUTION INTRAVENOUS at 06:58

## 2019-02-06 ASSESSMENT — ENCOUNTER SYMPTOMS
SHORTNESS OF BREATH: 0
CHILLS: 0
HEMOPTYSIS: 0
NERVOUS/ANXIOUS: 0
INSOMNIA: 0
WEAKNESS: 0
DIARRHEA: 0
VOMITING: 0
FOCAL WEAKNESS: 0
NAUSEA: 0
TREMORS: 0
EYE REDNESS: 0
WHEEZING: 0
FEVER: 0
MYALGIAS: 0
CONSTIPATION: 1
DIZZINESS: 0
ABDOMINAL PAIN: 1
LOSS OF CONSCIOUSNESS: 0
EYE PAIN: 0
COUGH: 0
FALLS: 0
BLOOD IN STOOL: 0
PALPITATIONS: 0
HEADACHES: 0
SEIZURES: 0

## 2019-02-06 ASSESSMENT — GAIT ASSESSMENTS
GAIT LEVEL OF ASSIST: SUPERVISED
DISTANCE (FEET): 300
DEVIATION: ANTALGIC

## 2019-02-06 ASSESSMENT — COGNITIVE AND FUNCTIONAL STATUS - GENERAL
CLIMB 3 TO 5 STEPS WITH RAILING: A LITTLE
MOVING TO AND FROM BED TO CHAIR: A LITTLE
TURNING FROM BACK TO SIDE WHILE IN FLAT BAD: A LITTLE
MOBILITY SCORE: 21
SUGGESTED CMS G CODE MODIFIER MOBILITY: CJ

## 2019-02-06 NOTE — PROGRESS NOTES
Bed alarm refused despite education. Pt education provided regarding fall risk and the necessity of calling staff prior to mobilization for safety. A/Ox4, call light within reach, calls appropriately.

## 2019-02-06 NOTE — PROGRESS NOTES
Placed humidification on patients oxygen per MD request.   Removed basal rate from pca per new order.   Educated patient and family who is at bedside.   Patient verbalizes understanding.

## 2019-02-06 NOTE — FACE TO FACE
Face to Face Supporting Documentation - Home Health    The encounter with this patient was in whole or in part the primary reason for home health admission.    Date of encounter:   Patient:                    MRN:                       YOB: 2021  Clementine Hopper  [id number]  1945     Home health to see patient for:  Skilled Nursing care for assessment, interventions & education and Wound Care    Skilled need for:  Surgical Aftercare liver and subhepatic abscess    Skilled nursing interventions to include:  Wound Care    Homebound status evidenced by:  Needs the assistance of another person in order to leave the home. Leaving home requires a considerable and taxing effort. There is a normal inability to leave the home.    Community Physician to provide follow up care: Damien Phillips M.D.     Optional Interventions? No      I certify the face to face encounter for this home health care referral meets the CMS requirements and the encounter/clinical assessment with the patient was, in whole, or in part, for the medical condition(s) listed above, which is the primary reason for home health care. Based on my clinical findings: the service(s) are medically necessary, support the need for home health care, and the homebound criteria are met.  I certify that this patient has had a face to face encounter by myself.  Torey Donaldson M.D. - NPI: [id number]

## 2019-02-06 NOTE — THERAPY
"Occupational Therapy Evaluation completed.   Functional Status:  Pt pleasant, both son & daughter present & supportive.  Pt able to perform supine to sit EOB with Min A & extra time.  Pt did require assist with LB dressing due to abd pain.  Pt able to amb with SBA, pt declined FWW stated it was too much equipment but used IV pole instead.  Pt on 1L O2 N/C.  Long discussion with pt & family about homemaking tasks that will be challenging at D/C.  Pt returned to supine in bed.  Plan of Care: Will benefit from Occupational Therapy 2 times per week  Discharge Recommendations:  Equipment: Will Continue to Assess for Equipment Needs. Post-acute therapy Discharge to home with outpatient or home health for additional skilled therapy services.    Pt is very motivated & independent.  Pt does plan to D/C to her daughters home initially until she feels ready to return to independent living.  Suspect pt will progress well & be able to D/C home with family once medically cleared.    See \"Rehab Therapy-Acute\" Patient Summary Report for complete documentation.    "

## 2019-02-06 NOTE — PROGRESS NOTES
Surgical Progress Note    Author: Pablo Cannon Date & Time created: 2019   8:54 AM     Interval Events:  Pod 2  TOLERATING GENERAL DIET  AMBULATING  PAIN WELL CONTROLLED  Review of Systems   Gastrointestinal: Positive for abdominal pain.   All other systems reviewed and are negative.    Hemodynamics:  Temp (24hrs), Av.7 °C (98.1 °F), Min:36.3 °C (97.3 °F), Max:37.2 °C (98.9 °F)  Temperature: 37.2 °C (98.9 °F)  Pulse  Av.7  Min: 56  Max: 80   Blood Pressure : 156/73     Respiratory:    Respiration: 16, Pulse Oximetry: 96 %           Neuro:  GCS       Fluids:    Intake/Output Summary (Last 24 hours) at 19 0854  Last data filed at 19 0658   Gross per 24 hour   Intake          2485.55 ml   Output             1355 ml   Net          1130.55 ml        Current Diet Order   Procedures   • Diet Order Low Fiber(GI Soft)     Physical Exam   Neck: Normal range of motion. Neck supple.   Cardiovascular: Normal rate and regular rhythm.    Pulmonary/Chest: Effort normal and breath sounds normal.   Abdominal: Soft. Bowel sounds are normal. There is tenderness.   Minimal pain  BRAXTON- still slight bile staining   Musculoskeletal: She exhibits edema.   Nursing note and vitals reviewed.    Labs:  Recent Results (from the past 24 hour(s))   Fungal Culture    Collection Time: 19 11:35 AM   Result Value Ref Range    Significant Indicator NEG     Source BF     Site PERITONEAL FLUID     Fungal Culture Culture in progress.    GRAM STAIN    Collection Time: 19 11:35 AM   Result Value Ref Range    Significant Indicator .     Source BF     Site PERITONEAL FLUID     Gram Stain Result No organisms seen.    CBC WITH DIFFERENTIAL    Collection Time: 19  3:22 AM   Result Value Ref Range    WBC 11.1 (H) 4.8 - 10.8 K/uL    RBC 3.07 (L) 4.20 - 5.40 M/uL    Hemoglobin 9.6 (L) 12.0 - 16.0 g/dL    Hematocrit 28.5 (L) 37.0 - 47.0 %    MCV 92.8 81.4 - 97.8 fL    MCH 31.3 27.0 - 33.0 pg    MCHC 33.7 33.6 -  35.0 g/dL    RDW 44.1 35.9 - 50.0 fL    Platelet Count 234 164 - 446 K/uL    MPV 9.2 9.0 - 12.9 fL    Neutrophils-Polys 79.00 (H) 44.00 - 72.00 %    Lymphocytes 7.70 (L) 22.00 - 41.00 %    Monocytes 11.70 0.00 - 13.40 %    Eosinophils 0.50 0.00 - 6.90 %    Basophils 0.20 0.00 - 1.80 %    Immature Granulocytes 0.90 0.00 - 0.90 %    Nucleated RBC 0.00 /100 WBC    Neutrophils (Absolute) 8.79 (H) 2.00 - 7.15 K/uL    Lymphs (Absolute) 0.85 (L) 1.00 - 4.80 K/uL    Monos (Absolute) 1.30 (H) 0.00 - 0.85 K/uL    Eos (Absolute) 0.05 0.00 - 0.51 K/uL    Baso (Absolute) 0.02 0.00 - 0.12 K/uL    Immature Granulocytes (abs) 0.10 0.00 - 0.11 K/uL    NRBC (Absolute) 0.00 K/uL   COMP METABOLIC PANEL    Collection Time: 02/06/19  3:22 AM   Result Value Ref Range    Sodium 128 (L) 135 - 145 mmol/L    Potassium 4.0 3.6 - 5.5 mmol/L    Chloride 98 96 - 112 mmol/L    Co2 25 20 - 33 mmol/L    Anion Gap 5.0 0.0 - 11.9    Glucose 110 (H) 65 - 99 mg/dL    Bun 5 (L) 8 - 22 mg/dL    Creatinine 0.45 (L) 0.50 - 1.40 mg/dL    Calcium 7.6 (L) 8.5 - 10.5 mg/dL    AST(SGOT) 50 (H) 12 - 45 U/L    ALT(SGPT) 56 (H) 2 - 50 U/L    Alkaline Phosphatase 180 (H) 30 - 99 U/L    Total Bilirubin 1.0 0.1 - 1.5 mg/dL    Albumin 2.6 (L) 3.2 - 4.9 g/dL    Total Protein 4.5 (L) 6.0 - 8.2 g/dL    Globulin 1.9 1.9 - 3.5 g/dL    A-G Ratio 1.4 g/dL   ESTIMATED GFR    Collection Time: 02/06/19  3:22 AM   Result Value Ref Range    GFR If African American >60 >60 mL/min/1.73 m 2    GFR If Non African American >60 >60 mL/min/1.73 m 2     Medical Decision Making, by Problem:  Active Hospital Problems    Diagnosis   • Biliary stricture [K83.1]     Priority: High   • Liver abscess [K75.0]     Priority: High   • Transaminitis and hyperbilirubinemia [R74.0]     Priority: High   • Gangrenous cholecystitis [K81.0]     Priority: High   • Constipation [K59.00]   • Anemia [D64.9]   • Dyslipidemia [E78.5]   • Hyponatremia [E87.1]   • Hypothyroid [E03.9]   • Hypertension [I10]      Plan:  DC PCA and IVF  PO pain merds started  Proton pump inhibitor.  \BRAXTON stay in    Quality Measures:  Quality-Core Measures    Discussed patient condition with Family and RN

## 2019-02-06 NOTE — THERAPY
"Physical Therapy Evaluation completed.   Bed Mobility:  Supine to Sit: Stand by Assist  Transfers: Sit to Stand: Stand by Assist  Gait: Level Of Assist: Supervised with No Equipment Needed       Plan of Care: Patient with no further skilled PT needs in the acute care setting at this time and Patient demonstrates safety with mobility in this environment at this time.   Discharge Recommendations: Equipment: No Equipment Needed. Post-acute therapy Currently anticipate no further skilled therapy needs once patient is discharged from the inpatient setting.    See \"Rehab Therapy-Acute\" Patient Summary Report for complete documentation.       Pt is a 72 y/o female presenting following Underwent open cholecystectomy for gangrenous cholecystitis, drainage of liver abscess, and drainage of peritoneal abscess. Pt with some limited functional mobility due to pain, but is able to perform tasks with slowed pacing and breathing techniques to manage symptoms. Pt able to ambulate without AD and perform stairs without issue. She has good familial support and will have someone with her at all times upon DC. Pt with no further acute PT needs at this time, Pt safe to continue mobilizing with family and [facility] staff. Recommend DC home with family from a PT perspective without AD.   "

## 2019-02-06 NOTE — PROGRESS NOTES
Hospital Medicine Daily Progress Note    Date of Service  2/6/2019    Chief Complaint  73 y.o. female admitted 2/2/2019 with abdominal pain    Hospital Course    Remote history of biliary surgery at 8yrs old  History of biliary stricture, stricture right above the pancreatic head, obvious with extreme dilation of   the extrahepatic ducts and intrahepatic ducts, several ERCPs most recent 1/31 done by Dr. Moore, GI where   he was able to cannulate through this very tight stricture in the common hepatic duct with upstream dilation of the biliary system with a wire and then placed a fully covered metal stent with a plan  to repeat, it was to obviously bridge this stricture; also follows Dr. Londono, Surgery.   Admitted 1/31 to [facility] for RUQ pain with CT imaging from outlying facility.with biliary obstruction, gallbladder distention.   HIDA scan showed nonvisualization of the gallbladder suggestive of cystic duct obstruction which can be seen in acute cholecystitis.  Dr. Londono, Surgery consulted 2/1,  transferred to [facility] 2/3.  She underwent laparotomy with drainage of peritoneal abscess in subhepatic space, open cholecystectomy for gangrenous cholecystitis and hepatic abscess drainage.      Interval Problem Update  BP has remained elevated, increased her lisinopril  her pain is under control  Will d/c PCA pump and transition to orals      Consultants/Specialty  GI  Surgery  ID    Code Status  full    Disposition  May need advanced level of care postoperatively  PT/OT ordered.    Review of Systems  Review of Systems   Constitutional: Negative for chills and fever.   HENT: Negative for congestion, hearing loss and nosebleeds.    Eyes: Negative for pain and redness.   Respiratory: Negative for cough, hemoptysis, shortness of breath and wheezing.    Cardiovascular: Negative for chest pain and palpitations.   Gastrointestinal: Positive for abdominal pain and constipation. Negative for blood in  stool, diarrhea, nausea and vomiting.   Genitourinary: Negative for dysuria, frequency and hematuria.   Musculoskeletal: Negative for falls, joint pain and myalgias.   Skin: Negative for rash.   Neurological: Negative for dizziness, tremors, focal weakness, seizures, loss of consciousness, weakness and headaches.   Psychiatric/Behavioral: The patient is not nervous/anxious and does not have insomnia.    All other systems reviewed and are negative.       Physical Exam  Temp:  [36.3 °C (97.3 °F)-37.2 °C (98.9 °F)] 37.2 °C (98.9 °F)  Pulse:  [62-66] 62  Resp:  [15-18] 16  BP: (152-167)/(69-73) 156/73  SpO2:  [94 %-96 %] 96 %    Physical Exam   Constitutional: She is oriented to person, place, and time. She appears well-developed.   Thin, elderly     HENT:   Head: Normocephalic and atraumatic.   Eyes: Conjunctivae are normal. No scleral icterus.   Cardiovascular: Normal rate and regular rhythm.    No murmur heard.  Pulmonary/Chest: Effort normal and breath sounds normal. No respiratory distress. She has no wheezes.   Abdominal: Soft. Bowel sounds are normal. She exhibits no distension. There is tenderness (Mild, RUQ). There is no rebound and no guarding.   Musculoskeletal: She exhibits no edema or tenderness.   Neurological: She is alert and oriented to person, place, and time.   Skin: Skin is warm.   Psychiatric: She has a normal mood and affect. Her behavior is normal.   Nursing note and vitals reviewed.      Fluids    Intake/Output Summary (Last 24 hours) at 02/06/19 0806  Last data filed at 02/06/19 0603   Gross per 24 hour   Intake          1185.55 ml   Output             1355 ml   Net          -169.45 ml       Laboratory  Recent Labs      02/04/19   0329  02/05/19   0303  02/06/19   0322   WBC  9.5  12.5*  11.1*   RBC  3.18*  3.25*  3.07*   HEMOGLOBIN  9.9*  10.0*  9.6*   HEMATOCRIT  29.5*  30.3*  28.5*   MCV  92.5  93.2  92.8   MCH  30.8  30.8  31.3   MCHC  33.3*  33.0*  33.7   RDW  43.4  44.6  44.1   PLATELETCT   189  238  234   MPV  9.9  9.4  9.2     Recent Labs      02/04/19   0329  02/05/19   0303  02/06/19   0322   SODIUM  133*  135  128*   POTASSIUM  3.4*  4.2  4.0   CHLORIDE  105  102  98   CO2  23  25  25   GLUCOSE  101*  100*  110*   BUN  5*  5*  5*   CREATININE  0.58  0.56  0.45*   CALCIUM  7.6*  7.5*  7.6*     Recent Labs      02/04/19   0329   INR  1.14*               Imaging  No orders to display        Assessment/Plan  * Gangrenous cholecystitis- (present on admission)   Assessment & Plan    2/4 Underwent open cholecystectomy for gangrenous cholecystitis, drainage of liver abscess, and drainage of peritoneal abscess   Improvement in pain   Cont antibiotics     Liver abscess   Assessment & Plan    S/p drainage on 2/4  Cont antibiotics  I discussed case with ID and Will need repeat CT scan prior to d/c antibiotics.      Biliary stricture   Assessment & Plan    Chronic, with possible stent malfunction  As above     Transaminitis and hyperbilirubinemia- (present on admission)   Assessment & Plan    Due to above  Resolving     Anemia   Assessment & Plan    Mild, no active bleeding.  Did have a Hb drop. Likely dilutional  Ordered iron studies.     Constipation   Assessment & Plan    OK to do suppository     Dyslipidemia   Assessment & Plan    Statin held for now because of transaminitis     Hyponatremia- (present on admission)   Assessment & Plan    Mild, stable     Hypothyroid- (present on admission)   Assessment & Plan    Continue levothyroxine     Hypertension- (present on admission)   Assessment & Plan    Stable  On lisinopril          VTE prophylaxis: SCDs

## 2019-02-06 NOTE — PROGRESS NOTES
Bedside report received.  Assessment complete.    A&O x 4. Patient calls appropriately. Family at bedside.  Patient ambualtes with walker and standby assist.   Patient reports mild pain. PCA discontinued. Rn explained PRN pain medication available.  Denies N&V. Tolerating regular diet.  Surgical incision upper abdomen with prevena in place.  + void, - flatus  Patient denies SOB.     Review plan with of care with patient. Call light and personal belongings with in reach. Hourly rounding in place. All needs met at this time.

## 2019-02-06 NOTE — PROGRESS NOTES
Infectious Disease Progress Note    Author: Eunice Marcos M.D. Date & Time of service: 2019  8:32 AM    Chief Complaint:  Abdominal abscesses    Interval History:   Interval 24 hours of Vitals/Labs/Micro,and imaging results reviewed and compared to prior.  See assessment.       Review of Systems:  Review of Systems   Constitutional: Negative for chills, fever and malaise/fatigue.   Respiratory: Negative for cough and shortness of breath.    Cardiovascular: Negative for chest pain.   Gastrointestinal: Positive for abdominal pain. Negative for diarrhea, nausea and vomiting.   Musculoskeletal: Negative for myalgias.   Skin: Negative for rash.       Hemodynamics:  Temp (24hrs), Av.7 °C (98.1 °F), Min:36.3 °C (97.3 °F), Max:37.2 °C (98.9 °F)  Temperature: 37.2 °C (98.9 °F)  Pulse  Av.7  Min: 56  Max: 80   Blood Pressure : 156/73       Physical Exam:  Physical Exam   Constitutional: She is oriented to person, place, and time. She appears well-developed and well-nourished.   HENT:   Head: Normocephalic and atraumatic.   Cardiovascular: Normal rate, regular rhythm and normal heart sounds.    Pulmonary/Chest: Effort normal and breath sounds normal.   Abdominal: Soft. Bowel sounds are normal.   Wound VAC in place, no stranding areas of erythema, warmth or tenderness.  Right upper quadrant drain with biliary fluid.   Musculoskeletal: Normal range of motion.   Neurological: She is alert and oriented to person, place, and time.   Skin: Skin is warm and dry.   Psychiatric: She has a normal mood and affect. Her behavior is normal.       Meds:    Current Facility-Administered Medications:   •  lisinopril  •  sodium chloride  •  morphine  •  levothyroxine  •  Pharmacy Consult Request  •  promethazine  •  0.9 % NaCl with KCl 40 mEq 1,000 mL  •  NS  •  ondansetron  •  senna-docusate **AND** polyethylene glycol/lytes **AND** magnesium hydroxide **AND** bisacodyl  •  cefTRIAXone (ROCEPHIN) IV  •   metroNIDAZOLE    Labs:  Recent Labs      02/04/19 0329 02/05/19   0303  02/06/19   0322   WBC  9.5  12.5*  11.1*   RBC  3.18*  3.25*  3.07*   HEMOGLOBIN  9.9*  10.0*  9.6*   HEMATOCRIT  29.5*  30.3*  28.5*   MCV  92.5  93.2  92.8   MCH  30.8  30.8  31.3   RDW  43.4  44.6  44.1   PLATELETCT  189  238  234   MPV  9.9  9.4  9.2   NEUTSPOLYS  82.20*  84.00*  79.00*   LYMPHOCYTES  8.90*  4.00*  7.70*   MONOCYTES  7.50  10.80  11.70   EOSINOPHILS  0.60  0.10  0.50   BASOPHILS  0.20  0.40  0.20     Recent Labs      02/04/19 0329 02/05/19   0303  02/06/19 0322   SODIUM  133*  135  128*   POTASSIUM  3.4*  4.2  4.0   CHLORIDE  105  102  98   CO2  23  25  25   GLUCOSE  101*  100*  110*   BUN  5*  5*  5*     Recent Labs      02/04/19 0329 02/05/19   0303  02/06/19   0322   ALBUMIN  2.5*  2.7*  2.6*   TBILIRUBIN  1.4  1.1  1.0   ALKPHOSPHAT  233*  226*  180*   TOTPROTEIN  4.3*  4.6*  4.5*   ALTSGPT  59*  71*  56*   ASTSGOT  14  98*  50*   CREATININE  0.58  0.56  0.45*       Imaging:  [id number] - Panc Stent - Tube    Result Date: 1/28/2019 1/28/2019 8:18 AM HISTORY/REASON FOR EXAM:  Common bile duct stricture. Stent placement TECHNIQUE/EXAM DESCRIPTION AND NUMBER OF VIEWS: Fluoroscopic images obtained during ERCP COMPARISON: Pancreatic CT 12/14/2018 FINDINGS: Fluoroscopic images obtained during pancreatic CT for stent placement. Number of images: 11. Fluoroscopy time: 1.3 minutes     Status post common bile duct stent placement    Nm-hepatobiliary Scan    Result Date: 2/1/2019 2/1/2019 11:40 AM HISTORY/REASON FOR EXAM: Cholecystitis TECHNIQUE/EXAM DESCRIPTION AND NUMBER OF VIEWS: Hepatobiliary scan. COMPARISON: 1/28/2019 PROCEDURE:  5.3 mCi Tc 99m-Choletec was administered intravenously, followed by 55 minutes of anterior planar imaging. FINDINGS: Tracer is seen within the liver. There is excretion of tracer into the common duct and small bowel. The gallbladder was not visualized during initial 55 minutes of  imaging. The patient was unable to continue with further imaging and was brought back 4 hours later for delayed images. Nonvisualization of the gallbladder on 4 hour delayed images is suggestive of cystic duct obstruction.     Nonvisualization of the gallbladder suggestive of cystic duct obstruction which can be seen in acute cholecystitis.     Dx-portable Fluoroscopy < 1 Hour    Result Date: 1/28/2019 1/28/2019 8:18 AM HISTORY/REASON FOR EXAM:  ERCP with stent placement, So [facility]sa TECHNIQUE/EXAM DESCRIPTION AND NUMBER OF VIEWS: Portable fluoroscopy for less than one hour FINDINGS:      The portable fluoroscopy unit was obligated to the procedure for less than one hour.   Actual fluoro time was 1.3 minutes.     Portable fluoroscopy utilized for 1.3 minutes.       Micro:  Results     Procedure Component Value Units Date/Time    GRAM STAIN [[id number]] Collected:  02/05/19 1135    Order Status:  Completed Specimen:  Body Fluid Updated:  02/05/19 1824     Significant Indicator .     Source BF     Site PERITONEAL FLUID     Gram Stain Result No organisms seen.    Narrative:       Collected By:[id number] ZULY HERNANDEZ  Please obtain from drain    Fungal Culture [[id number]] Collected:  02/05/19 1135    Order Status:  Completed Specimen:  Body Fluid from Peritoneal Fluid Updated:  02/05/19 1823     Significant Indicator NEG     Source BF     Site PERITONEAL FLUID     Fungal Culture Culture in progress.    Narrative:       Collected By:[id number] ZULY HERNANDEZ  Please obtain from drain    CULTURE WOUND W/ GRAM STAIN [[id number]] Collected:  02/04/19 1345    Order Status:  Completed Specimen:  Wound Updated:  02/05/19 1438     Significant Indicator NEG     Source WND     Site Billiary     Culture Result Wound No growth at 24 hours     Gram Stain Result No organisms seen.    Narrative:       Anaerobic glass swab sent down to lab broken, only used Aerobic swab for  culture.    ANAEROBIC CULTURE [[id number]]  "Collected:  02/04/19 1345    Order Status:  Completed Specimen:  Wound Updated:  02/05/19 1438     Significant Indicator NEG     Source WND     Site Billiary     Anaerobic Culture, Culture Res Culture in progress.    Narrative:       Anaerobic glass swab sent down to lab broken, only used Aerobic swab for  culture.    FLUID CULTURE W/GRAM STAIN [[id number]] Collected:  02/05/19 1135    Order Status:  Completed Specimen:  Other Updated:  02/05/19 1242    Narrative:       Collected By:[id number] ZULY HERNANDEZ  Please obtain from drain    FLUID CULTURE [[id number]] Collected:  02/05/19 1135    Order Status:  Canceled Specimen:  Other from Peritoneal Fluid Updated:  02/05/19 1140    Blood Culture [[id number]] Collected:  02/04/19 1703    Order Status:  Completed Specimen:  Blood from Peripheral Updated:  02/05/19 0850     Significant Indicator NEG     Source BLD     Site PERIPHERAL     Blood Culture No Growth    Note: Blood cultures are incubated for 5 days and  are monitored continuously.Positive blood cultures  are called to the RN and reported as soon as  they are identified.      Narrative:       Per Hospital Policy: Only change Specimen Src: to \"Line\" if  specified by physician order.    Blood Culture [[id number]] Collected:  02/04/19 1703    Order Status:  Completed Specimen:  Blood from Peripheral Updated:  02/05/19 0850     Significant Indicator NEG     Source BLD     Site PERIPHERAL     Blood Culture No Growth    Note: Blood cultures are incubated for 5 days and  are monitored continuously.Positive blood cultures  are called to the RN and reported as soon as  they are identified.      Narrative:       Per Hospital Policy: Only change Specimen Src: to \"Line\" if  specified by physician order.    Urinalysis [[id number]]  (Abnormal) Collected:  02/05/19 0117    Order Status:  Completed Specimen:  Urine from Urine, Clean Catch Updated:  02/05/19 0126     Color Yellow     Character Clear     Specific Gravity 1.018 "     Ph 5.0     Glucose Negative mg/dL      Ketones 80 (A) mg/dL      Protein Negative mg/dL      Bilirubin Negative     Urobilinogen, Urine 0.2     Nitrite Negative     Leukocyte Esterase Negative     Occult Blood Negative     Micro Urine Req see below     Comment: Microscopic examination not performed when specimen is clear  and chemically negative for protein, blood, leukocyte esterase  and nitrite.         Narrative:       Collected By:[id number] NIKKY BOYKIN  If not done within the last 24 hours    BLOOD CULTURE [[id number]] Collected:  02/05/19 0000    Order Status:  Canceled Specimen:  Other from Peripheral     BLOOD CULTURE [[id number]] Collected:  02/05/19 0000    Order Status:  Canceled Specimen:  Other from Peripheral     GRAM STAIN [[id number]] Collected:  02/04/19 1345    Order Status:  Completed Specimen:  Wound Updated:  02/04/19 1724     Significant Indicator .     Source WND     Site Billiary     Gram Stain Result No organisms seen.    Narrative:       Anaerobic glass swab sent down to lab broken, only used Aerobic swab for  culture.    Blood Culture [[id number]]     Order Status:  Canceled Specimen:  Blood from Peripheral     Blood Culture [[id number]]     Order Status:  Canceled Specimen:  Blood from Peripheral           Assessment:  Active Hospital Problems    Diagnosis   • *Gangrenous cholecystitis [K81.0]   • Biliary stricture [K83.1]   • Liver abscess [K75.0]   • Transaminitis and hyperbilirubinemia [R74.0]   • Constipation [K59.00]   • Anemia [D64.9]   • Dyslipidemia [E78.5]   • Hyponatremia [E87.1]   • Hypothyroid [E03.9]   • Hypertension [I10]        ASSESSMENT:         73 y.o. admitted 2/2/2019. Pt has a past medical history of biliary obstruction as a child, hypertension, hypothyroidism and hyperlipidemia.  She experienced left upper quadrant abdominal pain beginning in mid December and underwent ERCP.  She continued to have symptoms and again had ERCP 1/28 with metal stent placed.  She  continued to have transaminitis abdominal pain and was transferred to [facility] for surgical exploration and intervention.  She is taken to the OR on 2/4.  The plan had been for exploratory laparoscopy however it was converted to open laparotomy.  Patient was found to have peritoneal abscess in the right upper quadrant subhepatic space.  She also was found to have gangrenous cholecystitis with large pocket of purulent material posterior to gallbladder and cholecystectomy was performed.  Hepatectomy and drainage was also performed due 3 cm hepatic abscess.  Intraoperative cultures were obtained.  However only 1 aerobic swab. Drain placed in the right upper quadrant adjacent to our cholecystectomy site, peritoneal abscess, and liver abscess.  Requested additional cultures from drain fluid.     Interval 24 hour assessment:  Events: none adverse   AF, O2 1 L NC,    Labs WBC 11.1 stable, LFTs improved   Micro reviewed     Patient sleeping and comfortable.  She reports some mild abdominal discomfort but minimal.  Pt continues on ceftriaxone 2 g every 24 and Flagyl 500 mg 3 times daily      Problems      Peritoneal abscess, right upper quadrant subhepatic space near gallbladder with pocket pf purulent fluid   Hepatic abscess, also with purulent fluid   Gangrenous cholecystitis   Leukocytosis peaked at 17 on 2/1-improving daily  Elevated liver enzymes, cholestatic pattern- improving          PLAN:      Continue ceftriaxone and Flagyl  Will plan on at least 7-10 days of antibiotics with repeat CT abdomen pelvis prior to discontinuation  Follow-up OR culture   Follow-up cultures from drain - obtained less than 24 hours from placement so results should still be valid   Follow-up post-op blood cultures, no growth to date   The metal stent will be a nidus of infection, would recommend removal as feasible, taking into account patient's need for biliary drainage        Plan of care discussed with MARISSA Donaldson M.D.. Will continue  to follow     Eunice Marcos M.D.

## 2019-02-06 NOTE — PROGRESS NOTES
Needed to troubleshoot prevena multiple times. Drape added and prevena exchanged. Currently functioning appropriately.

## 2019-02-06 NOTE — PROGRESS NOTES
A/Ox 4.  VSS.  Denies pain at rest, morphine PCA in place, ice applied, splinting education provided.  Reports increasing pain with movement and utilizing PCA appropriately.     Abdomen incisions, dressings CDI.  Abdominal prevena with gauze, saturated, will monitor.  RLQ BRAXTON, small serosanguineous output, dressing CDI.  +normooactive BSX4, denies flatus, LBM 02/04, denies n/v.  Abdomen semi-firm, tender, no distention or discoloration noted.  Poor PO intake, tolerating sips and ice chips, refusing to eat.     + DOMINGO, denies numbness and tingling, generalized weakness.  0.5L NC, satting >90%, denies SOB or difficulty breathing, IS used appropriately.  +Trace BLE swelling, SCDs in place.  + void, QS.  Up with SBA and FWW, tolerates activity well.  Up to BR, repositions self frequently.  IVF and PCA to PIV, PO intake encouraged.     Call light within reach, calls appropriately.  Bed in lowest locked position.

## 2019-02-06 NOTE — CARE PLAN
Problem: Nutritional:  Goal: Achieve adequate nutritional intake  Patient will consume >50% of meals   Outcome: PROGRESSING AS EXPECTED  PO intake 50-75% x1 recent meal. Some nausea today per report. Boost Plus TID with meals in place.

## 2019-02-07 LAB
ALBUMIN SERPL BCP-MCNC: 2.8 G/DL (ref 3.2–4.9)
ALBUMIN/GLOB SERPL: 1.3 G/DL
ALP SERPL-CCNC: 175 U/L (ref 30–99)
ALT SERPL-CCNC: 49 U/L (ref 2–50)
ANION GAP SERPL CALC-SCNC: 7 MMOL/L (ref 0–11.9)
AST SERPL-CCNC: 38 U/L (ref 12–45)
BACTERIA WND AEROBE CULT: NORMAL
BASOPHILS # BLD AUTO: 0.4 % (ref 0–1.8)
BASOPHILS # BLD: 0.04 K/UL (ref 0–0.12)
BILIRUB FLD-MCNC: 27.1 MG/DL
BILIRUB SERPL-MCNC: 0.8 MG/DL (ref 0.1–1.5)
BODY FLD TYPE: NORMAL
BUN SERPL-MCNC: 5 MG/DL (ref 8–22)
CALCIUM SERPL-MCNC: 7.7 MG/DL (ref 8.5–10.5)
CHLORIDE SERPL-SCNC: 99 MMOL/L (ref 96–112)
CO2 SERPL-SCNC: 26 MMOL/L (ref 20–33)
CREAT SERPL-MCNC: 0.54 MG/DL (ref 0.5–1.4)
EOSINOPHIL # BLD AUTO: 0.08 K/UL (ref 0–0.51)
EOSINOPHIL NFR BLD: 0.7 % (ref 0–6.9)
ERYTHROCYTE [DISTWIDTH] IN BLOOD BY AUTOMATED COUNT: 43.8 FL (ref 35.9–50)
GLOBULIN SER CALC-MCNC: 2.1 G/DL (ref 1.9–3.5)
GLUCOSE SERPL-MCNC: 104 MG/DL (ref 65–99)
GRAM STN SPEC: NORMAL
HCT VFR BLD AUTO: 30 % (ref 37–47)
HGB BLD-MCNC: 10 G/DL (ref 12–16)
IMM GRANULOCYTES # BLD AUTO: 0.19 K/UL (ref 0–0.11)
IMM GRANULOCYTES NFR BLD AUTO: 1.7 % (ref 0–0.9)
LYMPHOCYTES # BLD AUTO: 1.03 K/UL (ref 1–4.8)
LYMPHOCYTES NFR BLD: 9.4 % (ref 22–41)
MCH RBC QN AUTO: 30.7 PG (ref 27–33)
MCHC RBC AUTO-ENTMCNC: 33.3 G/DL (ref 33.6–35)
MCV RBC AUTO: 92 FL (ref 81.4–97.8)
MONOCYTES # BLD AUTO: 1.1 K/UL (ref 0–0.85)
MONOCYTES NFR BLD AUTO: 10 % (ref 0–13.4)
NEUTROPHILS # BLD AUTO: 8.54 K/UL (ref 2–7.15)
NEUTROPHILS NFR BLD: 77.8 % (ref 44–72)
NRBC # BLD AUTO: 0 K/UL
NRBC BLD-RTO: 0 /100 WBC
PLATELET # BLD AUTO: 301 K/UL (ref 164–446)
PMV BLD AUTO: 8.9 FL (ref 9–12.9)
POTASSIUM SERPL-SCNC: 3.9 MMOL/L (ref 3.6–5.5)
PROT SERPL-MCNC: 4.9 G/DL (ref 6–8.2)
RBC # BLD AUTO: 3.26 M/UL (ref 4.2–5.4)
SIGNIFICANT IND 70042: NORMAL
SITE SITE: NORMAL
SODIUM SERPL-SCNC: 132 MMOL/L (ref 135–145)
SOURCE SOURCE: NORMAL
WBC # BLD AUTO: 11 K/UL (ref 4.8–10.8)

## 2019-02-07 PROCEDURE — 770001 HCHG ROOM/CARE - MED/SURG/GYN PRIV*

## 2019-02-07 PROCEDURE — 99232 SBSQ HOSP IP/OBS MODERATE 35: CPT | Performed by: HOSPITALIST

## 2019-02-07 PROCEDURE — 82247 BILIRUBIN TOTAL: CPT

## 2019-02-07 PROCEDURE — A9270 NON-COVERED ITEM OR SERVICE: HCPCS | Performed by: HOSPITALIST

## 2019-02-07 PROCEDURE — 700102 HCHG RX REV CODE 250 W/ 637 OVERRIDE(OP): Performed by: HOSPITALIST

## 2019-02-07 PROCEDURE — 700101 HCHG RX REV CODE 250: Performed by: INTERNAL MEDICINE

## 2019-02-07 PROCEDURE — 80053 COMPREHEN METABOLIC PANEL: CPT

## 2019-02-07 PROCEDURE — A9270 NON-COVERED ITEM OR SERVICE: HCPCS | Performed by: INTERNAL MEDICINE

## 2019-02-07 PROCEDURE — 700111 HCHG RX REV CODE 636 W/ 250 OVERRIDE (IP): Performed by: INTERNAL MEDICINE

## 2019-02-07 PROCEDURE — 85025 COMPLETE CBC W/AUTO DIFF WBC: CPT

## 2019-02-07 PROCEDURE — A9270 NON-COVERED ITEM OR SERVICE: HCPCS | Performed by: SURGERY

## 2019-02-07 PROCEDURE — 36415 COLL VENOUS BLD VENIPUNCTURE: CPT

## 2019-02-07 PROCEDURE — 700102 HCHG RX REV CODE 250 W/ 637 OVERRIDE(OP): Performed by: SURGERY

## 2019-02-07 PROCEDURE — 700105 HCHG RX REV CODE 258: Performed by: INTERNAL MEDICINE

## 2019-02-07 PROCEDURE — 700102 HCHG RX REV CODE 250 W/ 637 OVERRIDE(OP): Performed by: INTERNAL MEDICINE

## 2019-02-07 PROCEDURE — 99232 SBSQ HOSP IP/OBS MODERATE 35: CPT | Performed by: INTERNAL MEDICINE

## 2019-02-07 RX ORDER — HYDRALAZINE HYDROCHLORIDE 25 MG/1
25 TABLET, FILM COATED ORAL EVERY 8 HOURS PRN
Status: DISCONTINUED | OUTPATIENT
Start: 2019-02-07 | End: 2019-02-09 | Stop reason: HOSPADM

## 2019-02-07 RX ORDER — HYDRALAZINE HYDROCHLORIDE 20 MG/ML
20 INJECTION INTRAMUSCULAR; INTRAVENOUS EVERY 6 HOURS PRN
Status: DISCONTINUED | OUTPATIENT
Start: 2019-02-07 | End: 2019-02-07

## 2019-02-07 RX ORDER — AMLODIPINE BESYLATE 10 MG/1
5 TABLET ORAL
Status: DISCONTINUED | OUTPATIENT
Start: 2019-02-07 | End: 2019-02-09 | Stop reason: HOSPADM

## 2019-02-07 RX ADMIN — LEVOTHYROXINE SODIUM 100 MCG: 50 TABLET ORAL at 04:43

## 2019-02-07 RX ADMIN — METRONIDAZOLE 500 MG: 500 INJECTION, SOLUTION INTRAVENOUS at 06:00

## 2019-02-07 RX ADMIN — OMEPRAZOLE 20 MG: 20 CAPSULE, DELAYED RELEASE ORAL at 04:43

## 2019-02-07 RX ADMIN — CELECOXIB 100 MG: 100 CAPSULE ORAL at 04:43

## 2019-02-07 RX ADMIN — METRONIDAZOLE 500 MG: 500 INJECTION, SOLUTION INTRAVENOUS at 21:58

## 2019-02-07 RX ADMIN — POLYETHYLENE GLYCOL 3350 1 PACKET: 17 POWDER, FOR SOLUTION ORAL at 04:43

## 2019-02-07 RX ADMIN — SENNOSIDES AND DOCUSATE SODIUM 2 TABLET: 8.6; 5 TABLET ORAL at 04:43

## 2019-02-07 RX ADMIN — OXYCODONE AND ACETAMINOPHEN 1 TABLET: 5; 325 TABLET ORAL at 21:10

## 2019-02-07 RX ADMIN — SENNOSIDES AND DOCUSATE SODIUM 2 TABLET: 8.6; 5 TABLET ORAL at 17:08

## 2019-02-07 RX ADMIN — AMLODIPINE BESYLATE 5 MG: 10 TABLET ORAL at 17:08

## 2019-02-07 RX ADMIN — METRONIDAZOLE 500 MG: 500 INJECTION, SOLUTION INTRAVENOUS at 15:36

## 2019-02-07 RX ADMIN — LISINOPRIL 40 MG: 20 TABLET ORAL at 04:44

## 2019-02-07 RX ADMIN — CEFTRIAXONE SODIUM 2 G: 2 INJECTION, POWDER, FOR SOLUTION INTRAMUSCULAR; INTRAVENOUS at 04:43

## 2019-02-07 RX ADMIN — OXYCODONE AND ACETAMINOPHEN 1 TABLET: 5; 325 TABLET ORAL at 04:43

## 2019-02-07 RX ADMIN — HYDRALAZINE HYDROCHLORIDE 25 MG: 25 TABLET, FILM COATED ORAL at 18:36

## 2019-02-07 ASSESSMENT — ENCOUNTER SYMPTOMS
MYALGIAS: 0
NAUSEA: 0
CONSTIPATION: 0
FALLS: 0
NERVOUS/ANXIOUS: 0
HEMOPTYSIS: 0
WHEEZING: 0
DIZZINESS: 0
INSOMNIA: 0
COUGH: 0
PALPITATIONS: 0
DIARRHEA: 0
NAUSEA: 1
WEAKNESS: 0
FEVER: 0
ABDOMINAL PAIN: 0
BLOOD IN STOOL: 0
ABDOMINAL PAIN: 1
CHILLS: 0
VOMITING: 0
SHORTNESS OF BREATH: 0
HEADACHES: 0

## 2019-02-07 NOTE — PROGRESS NOTES
A/Ox 4.  VSS.  Denies pain at rest, ice refused, splinting education provided.  Reports increasing pain with movement, abdomen 7/10, medicated per MAR.     Abdomen incisions, dressings CDI.  Abdominal prevena, functioning appropriately.  RLQ BRAXTON, small serosanguineous output, dressing CDI.  +hypoactive BSX4, denies flatus, LBM 02/04, denies n/v.  Abdomen semi-firm, tender, no distention or discoloration noted.  Better PO intake today.     + DOMINGO, denies numbness and tingling, generalized weakness.  RA, satting >90%, denies SOB or difficulty breathing, IS used appropriately.  SCDs refused.  + void, QS.  Up with SBA, tolerates activity well.  Up to BR, repositions self frequently.  PIV TKO, PO intake encouraged.     Call light within reach, calls appropriately.  Bed in lowest locked position.

## 2019-02-07 NOTE — DISCHARGE PLANNING
Care Transition Team Assessment    RN CM met with pt to provide choice for Home Health, pt states she is currently unsure of her disposition, will be going to her daughter Laverne's hme or her brother's home in [place], [place] post discharge. Pt gave permission for RN CM to speak with her daughter, Laverne Mai regarding discharge plan, VM left for Laverne requesting a call.  Choice deferred for now, form left at bedside.  Pt currently receives her care in [place] and uses the [facility] there.  Her home address is [address] [place] [address]--she lives alone in a 3 story home .    Pt is completely independent in ADL's and self drives. Pt is pending a procedure regarding her stent prior to discharge.  RN [facility]     Information Source  Orientation : Oriented x 4  Information Given By: Patient  Who is responsible for making decisions for patient? : Patient    Elopement Risk  Legal Hold: No  Ambulatory or Self Mobile in Wheelchair: Yes  Disoriented: No  Psychiatric Symptoms: None  History of Wandering: No  Elopement this Admit: No  Vocalizing Wanting to Leave: No  Displays Behaviors, Body Language Wanting to Leave: No-Not at Risk for Elopement  Elopement Risk: Not at Risk for Elopement    Interdisciplinary Discharge Planning  Does Admitting Nurse Feel This Could be a Complex Discharge?: No  Primary Care Physician: Dr. Phillips  Lives with - Patient's Self Care Capacity: Unrelated Adult (pt currently has 2 students from [place] staying with her)  Patient or legal guardian wants to designate a caregiver (see row info): No  Support Systems: Family Member(s), Friends / Neighbors  Housing / Facility: 2 Story House  Do You Take your Prescribed Medications Regularly: Yes  Able to Return to Previous ADL's: Yes  Mobility Issues: No  Prior Services: Home-Independent  Assistance Needed: No  Durable Medical Equipment: Not Applicable    Discharge Preparedness  What is your plan after discharge?: Home  health care  What are your discharge supports?: Child  Prior Functional Level: Ambulatory, Drives Self, Independent with Activities of Daily Living, Independent with Medication Management  Difficulity with ADLs: None  Difficulity with IADLs: None    Functional Assesment  Prior Functional Level: Ambulatory, Drives Self, Independent with Activities of Daily Living, Independent with Medication Management    Finances  Financial Barriers to Discharge: No  Prescription Coverage: Yes    Vision / Hearing Impairment  Vision Impairment : Yes  Right Eye Vision: Impaired, Wears Glasses  Left Eye Vision: Impaired, Wears Glasses  Hearing Impairment : No  Hearing Impairment: Both Ears, Hearing Device Not Available    Values / Beliefs / Concerns  Values / Beliefs Concerns : No    Advance Directive  Advance Directive?: None  Advance Directive offered?: AD Booklet given    Domestic Abuse  Have you ever been the victim of abuse or violence?: No  Physical Abuse or Sexual Abuse: No  Verbal Abuse or Emotional Abuse: No     Discharge Risks or Barriers  Discharge risks or barriers?: Post-acute placement / services  Patient risk factors: Readmission    Anticipated Discharge Information  Discharge Address:  (will go to daughter Laverne's home in [place] or brother's home in [place], not yet determined)

## 2019-02-07 NOTE — PROGRESS NOTES
Hospital Medicine Daily Progress Note    Date of Service  2/7/2019    Chief Complaint  73 y.o. female admitted 2/2/2019 with abdominal pain    Hospital Course    Remote history of biliary surgery at 8yrs old  History of biliary stricture, stricture right above the pancreatic head, obvious with extreme dilation of   the extrahepatic ducts and intrahepatic ducts, several ERCPs most recent 1/31 done by Dr. Moore, GI where   he was able to cannulate through this very tight stricture in the common hepatic duct with upstream dilation of the biliary system with a wire and then placed a fully covered metal stent with a plan  to repeat, it was to obviously bridge this stricture; also follows Dr. Londono, Surgery.   Admitted 1/31 to [facility] for RUQ pain with CT imaging from outlying facility.with biliary obstruction, gallbladder distention.   HIDA scan showed nonvisualization of the gallbladder suggestive of cystic duct obstruction which can be seen in acute cholecystitis.  Dr. Londono, Surgery consulted 2/1,  transferred to [facility] 2/3.  She underwent laparotomy with drainage of peritoneal abscess in subhepatic space, open cholecystectomy for gangrenous cholecystitis and hepatic abscess drainage.      Interval Problem Update  BP still high, will monitor with increased lisinopril dose this morning  Plan to possibly changer her biliary stent by GI tomorrow  Discussed with ID and ok to transition to oral antibiotics on discharge   Biliary wound cultures still negative      Consultants/Specialty  GI  Surgery  ID    Code Status  full    Disposition  May need advanced level of care postoperatively  PT/OT ordered.    Review of Systems  Review of Systems   Constitutional: Negative for chills and fever.   Respiratory: Negative for cough, hemoptysis, shortness of breath and wheezing.    Cardiovascular: Negative for chest pain and palpitations.   Gastrointestinal: Positive for abdominal pain. Negative for blood in  stool, constipation, diarrhea, nausea and vomiting.   Genitourinary: Negative for dysuria, frequency and hematuria.   Musculoskeletal: Negative for falls, joint pain and myalgias.   Neurological: Negative for dizziness, weakness and headaches.   Psychiatric/Behavioral: The patient is not nervous/anxious and does not have insomnia.    All other systems reviewed and are negative.       Physical Exam  Temp:  [36.3 °C (97.4 °F)-36.9 °C (98.5 °F)] 36.4 °C (97.5 °F)  Pulse:  [60-64] 60  Resp:  [16-17] 16  BP: (108-165)/(63-74) 165/70  SpO2:  [94 %-96 %] 96 %    Physical Exam   Constitutional: She is oriented to person, place, and time. She appears well-developed.   Thin, elderly     HENT:   Head: Normocephalic and atraumatic.   Eyes: Conjunctivae are normal. No scleral icterus.   Cardiovascular: Normal rate and regular rhythm.    No murmur heard.  Pulmonary/Chest: Effort normal and breath sounds normal. No respiratory distress. She has no wheezes.   Abdominal: Soft. Bowel sounds are normal. She exhibits no distension. There is tenderness (Mild, RUQ). There is no rebound and no guarding.   Musculoskeletal: She exhibits no edema or tenderness.   Neurological: She is alert and oriented to person, place, and time.   Skin: Skin is warm.   Psychiatric: She has a normal mood and affect. Her behavior is normal.   Nursing note and vitals reviewed.      Fluids    Intake/Output Summary (Last 24 hours) at 02/07/19 1514  Last data filed at 02/07/19 1148   Gross per 24 hour   Intake              590 ml   Output             1475 ml   Net             -885 ml       Laboratory  Recent Labs      02/05/19   0303  02/06/19   0322  02/07/19   0407   WBC  12.5*  11.1*  11.0*   RBC  3.25*  3.07*  3.26*   HEMOGLOBIN  10.0*  9.6*  10.0*   HEMATOCRIT  30.3*  28.5*  30.0*   MCV  93.2  92.8  92.0   MCH  30.8  31.3  30.7   MCHC  33.0*  33.7  33.3*   RDW  44.6  44.1  43.8   PLATELETCT  238  234  301   MPV  9.4  9.2  8.9*     Recent Labs      02/05/19    0303  02/06/19   0322  02/07/19   0407   SODIUM  135  128*  132*   POTASSIUM  4.2  4.0  3.9   CHLORIDE  102  98  99   CO2  25  25  26   GLUCOSE  100*  110*  104*   BUN  5*  5*  5*   CREATININE  0.56  0.45*  0.54   CALCIUM  7.5*  7.6*  7.7*                   Imaging  No orders to display        Assessment/Plan  * Gangrenous cholecystitis- (present on admission)   Assessment & Plan    2/4 Underwent open cholecystectomy for gangrenous cholecystitis, drainage of liver abscess, and drainage of peritoneal abscess   Improvement in pain   Day 5 antibiotics of 7-10 per ID. Ok to switch to PO cipro and flagyl on discharge   repeat CT abdomen/pelvis in the next 2-3 weeks (around feb 21)  Cont antibiotics     Liver abscess   Assessment & Plan    S/p drainage on 2/4  Cont antibiotics  I discussed case with ID and Will need repeat CT scan prior to d/c antibiotics.      Biliary stricture   Assessment & Plan    Chronic, with possible stent malfunction  Stent replacement to be done on 2/8     Transaminitis and hyperbilirubinemia- (present on admission)   Assessment & Plan    Due to above  Resolving     Anemia   Assessment & Plan    Mild, no active bleeding.  Did have a Hb drop. Likely dilutional  Ordered iron studies wnl     Constipation   Assessment & Plan    resolved     Dyslipidemia   Assessment & Plan    Statin held for now because of transaminitis     Hyponatremia- (present on admission)   Assessment & Plan    Mild, stable     Hypothyroid- (present on admission)   Assessment & Plan    Continue levothyroxine     Hypertension- (present on admission)   Assessment & Plan    Stable  On lisinopril          VTE prophylaxis: SCDs

## 2019-02-07 NOTE — PROGRESS NOTES
Surgical Progress Note    Author: Pablo Cannon Date & Time created: 2019   8:02 AM     Interval Events:  Postop day 3 status post drainage of liver abscess, peritoneal abscess, and open cholecystectomy.  Patient's BRAXTON drain is putting out more bile appearing fluid at approximately 100-200 today.  This is most likely secondary to back pressure from the Wallstent partially occluding the right lobe.  Otherwise she is doing much better.  Labs are good.  Review of Systems   All other systems reviewed and are negative.    Hemodynamics:  Temp (24hrs), Av.7 °C (98.1 °F), Min:36.3 °C (97.4 °F), Max:37.2 °C (99 °F)  Temperature: 36.3 °C (97.4 °F)  Pulse  Av.3  Min: 56  Max: 80   Blood Pressure : 152/74     Respiratory:    Respiration: 17, Pulse Oximetry: 94 %        RUL Breath Sounds: Clear, RML Breath Sounds: Clear, RLL Breath Sounds: Clear, VLADISLAV Breath Sounds: Clear, LLL Breath Sounds: Clear  Neuro:  GCS       Fluids:    Intake/Output Summary (Last 24 hours) at 19 0802  Last data filed at 19 0405   Gross per 24 hour   Intake              830 ml   Output             1985 ml   Net            -1155 ml        Current Diet Order   Procedures   • Diet Order Low Fiber(GI Soft)     Physical Exam   Cardiovascular: Normal rate and regular rhythm.    Pulmonary/Chest: Effort normal and breath sounds normal.   Abdominal: Soft. Bowel sounds are normal. There is tenderness.   Minimal pain, BRAXTON drain is putting out bilious fluid.  Concern for possible obstruction of the right duct due to stent occluding the right main system.   Nursing note and vitals reviewed.    Labs:  Recent Results (from the past 24 hour(s))   CBC WITH DIFFERENTIAL    Collection Time: 19  4:07 AM   Result Value Ref Range    WBC 11.0 (H) 4.8 - 10.8 K/uL    RBC 3.26 (L) 4.20 - 5.40 M/uL    Hemoglobin 10.0 (L) 12.0 - 16.0 g/dL    Hematocrit 30.0 (L) 37.0 - 47.0 %    MCV 92.0 81.4 - 97.8 fL    MCH 30.7 27.0 - 33.0 pg    MCHC 33.3 (L)  33.6 - 35.0 g/dL    RDW 43.8 35.9 - 50.0 fL    Platelet Count 301 164 - 446 K/uL    MPV 8.9 (L) 9.0 - 12.9 fL    Neutrophils-Polys 77.80 (H) 44.00 - 72.00 %    Lymphocytes 9.40 (L) 22.00 - 41.00 %    Monocytes 10.00 0.00 - 13.40 %    Eosinophils 0.70 0.00 - 6.90 %    Basophils 0.40 0.00 - 1.80 %    Immature Granulocytes 1.70 (H) 0.00 - 0.90 %    Nucleated RBC 0.00 /100 WBC    Neutrophils (Absolute) 8.54 (H) 2.00 - 7.15 K/uL    Lymphs (Absolute) 1.03 1.00 - 4.80 K/uL    Monos (Absolute) 1.10 (H) 0.00 - 0.85 K/uL    Eos (Absolute) 0.08 0.00 - 0.51 K/uL    Baso (Absolute) 0.04 0.00 - 0.12 K/uL    Immature Granulocytes (abs) 0.19 (H) 0.00 - 0.11 K/uL    NRBC (Absolute) 0.00 K/uL   COMP METABOLIC PANEL    Collection Time: 02/07/19  4:07 AM   Result Value Ref Range    Sodium 132 (L) 135 - 145 mmol/L    Potassium 3.9 3.6 - 5.5 mmol/L    Chloride 99 96 - 112 mmol/L    Co2 26 20 - 33 mmol/L    Anion Gap 7.0 0.0 - 11.9    Glucose 104 (H) 65 - 99 mg/dL    Bun 5 (L) 8 - 22 mg/dL    Creatinine 0.54 0.50 - 1.40 mg/dL    Calcium 7.7 (L) 8.5 - 10.5 mg/dL    AST(SGOT) 38 12 - 45 U/L    ALT(SGPT) 49 2 - 50 U/L    Alkaline Phosphatase 175 (H) 30 - 99 U/L    Total Bilirubin 0.8 0.1 - 1.5 mg/dL    Albumin 2.8 (L) 3.2 - 4.9 g/dL    Total Protein 4.9 (L) 6.0 - 8.2 g/dL    Globulin 2.1 1.9 - 3.5 g/dL    A-G Ratio 1.3 g/dL   ESTIMATED GFR    Collection Time: 02/07/19  4:07 AM   Result Value Ref Range    GFR If African American >60 >60 mL/min/1.73 m 2    GFR If Non African American >60 >60 mL/min/1.73 m 2     Medical Decision Making, by Problem:  Active Hospital Problems    Diagnosis   • Biliary stricture [K83.1]     Priority: High   • Liver abscess [K75.0]     Priority: High   • Transaminitis and hyperbilirubinemia [R74.0]     Priority: High   • Gangrenous cholecystitis [K81.0]     Priority: High   • Constipation [K59.00]   • Anemia [D64.9]   • Dyslipidemia [E78.5]   • Hyponatremia [E87.1]   • Hypothyroid [E03.9]   • Hypertension [I10]      Plan:  Spoke with Dr. Moore regarding the patient's bile leak.  I suspect that the Wallstent is obstructing a portion of the right main ductal system causing backup pressure through the hepatectomy.  He will try to replace it with a shorter stent prior to discharge.  He will see the patient later today.  Otherwise all is going well.  Quality Measures:  Quality-Core Measures    Discussed patient condition with Family and RN

## 2019-02-07 NOTE — PROGRESS NOTES
Almost 100cc of dark green output in BRAXTON this morning.  Changed to coffee brown, clear.  VSS.  No changes on abdominal assessment.  MD already aware per day RN.

## 2019-02-07 NOTE — PROGRESS NOTES
Infectious Disease Progress Note    Author: Eunice Marcos M.D. Date & Time of service: 2019  10:41 AM    Chief Complaint:  Abdominal abscesses     Interval History:  2/6 AF, O2 1 L NC,  Labs WBC 11.1 stable, LFTs improved   Micro reviewed    Interval 24 hours of Vitals/Labs/Micro,and imaging results reviewed and compared to prior.  See assessment.      Review of Systems:  Review of Systems   Constitutional: Negative for chills, fever and malaise/fatigue.   Respiratory: Negative for cough and shortness of breath.    Cardiovascular: Negative for chest pain.   Gastrointestinal: Positive for nausea. Negative for abdominal pain, constipation, diarrhea and vomiting.   Musculoskeletal: Negative for myalgias.   Skin: Negative for rash.       Hemodynamics:  Temp (24hrs), Av.6 °C (97.8 °F), Min:36.3 °C (97.4 °F), Max:36.9 °C (98.5 °F)  Temperature: 36.4 °C (97.5 °F)  Pulse  Av.2  Min: 56  Max: 80   Blood Pressure : (!) 165/70       Physical Exam:  Physical Exam   Constitutional: She appears well-developed and well-nourished.   HENT:   Head: Normocephalic and atraumatic.   Eyes: Conjunctivae are normal.   Cardiovascular: Normal rate, regular rhythm and normal heart sounds.    Pulmonary/Chest: Effort normal.   Crackles on left lower lobe   Abdominal: Soft. Bowel sounds are normal. She exhibits no distension. There is no tenderness. There is no rebound and no guarding.   Right upper quadrant biliary drain with brown fluid   Musculoskeletal: Normal range of motion. She exhibits no edema.   Neurological: She is alert.   Skin: Skin is warm and dry.   Psychiatric: She has a normal mood and affect. Her behavior is normal.       Meds:    Current Facility-Administered Medications:   •  HYDROmorphone  •  oxyCODONE-acetaminophen  •  celecoxib  •  omeprazole  •  lisinopril  •  sodium chloride  •  levothyroxine  •  Pharmacy Consult Request  •  promethazine  •  NS  •  ondansetron  •  senna-docusate **AND**  polyethylene glycol/lytes **AND** magnesium hydroxide **AND** bisacodyl  •  cefTRIAXone (ROCEPHIN) IV  •  metroNIDAZOLE    Labs:  Recent Labs      02/05/19 0303 02/06/19 0322 02/07/19   0407   WBC  12.5*  11.1*  11.0*   RBC  3.25*  3.07*  3.26*   HEMOGLOBIN  10.0*  9.6*  10.0*   HEMATOCRIT  30.3*  28.5*  30.0*   MCV  93.2  92.8  92.0   MCH  30.8  31.3  30.7   RDW  44.6  44.1  43.8   PLATELETCT  238  234  301   MPV  9.4  9.2  8.9*   NEUTSPOLYS  84.00*  79.00*  77.80*   LYMPHOCYTES  4.00*  7.70*  9.40*   MONOCYTES  10.80  11.70  10.00   EOSINOPHILS  0.10  0.50  0.70   BASOPHILS  0.40  0.20  0.40     Recent Labs      02/05/19 0303 02/06/19 0322 02/07/19   0407   SODIUM  135  128*  132*   POTASSIUM  4.2  4.0  3.9   CHLORIDE  102  98  99   CO2  25  25  26   GLUCOSE  100*  110*  104*   BUN  5*  5*  5*     Recent Labs      02/05/19 0303 02/06/19 0322 02/07/19   0407   ALBUMIN  2.7*  2.6*  2.8*   TBILIRUBIN  1.1  1.0  0.8   ALKPHOSPHAT  226*  180*  175*   TOTPROTEIN  4.6*  4.5*  4.9*   ALTSGPT  71*  56*  49   ASTSGOT  98*  50*  38   CREATININE  0.56  0.45*  0.54       Imaging:  [id number]-gycn - Panc Stent - Tube    Result Date: 1/28/2019 1/28/2019 8:18 AM HISTORY/REASON FOR EXAM:  Common bile duct stricture. Stent placement TECHNIQUE/EXAM DESCRIPTION AND NUMBER OF VIEWS: Fluoroscopic images obtained during ERCP COMPARISON: Pancreatic CT 12/14/2018 FINDINGS: Fluoroscopic images obtained during pancreatic CT for stent placement. Number of images: 11. Fluoroscopy time: 1.3 minutes     Status post common bile duct stent placement    Nm-hepatobiliary Scan    Result Date: 2/1/2019 2/1/2019 11:40 AM HISTORY/REASON FOR EXAM: Cholecystitis TECHNIQUE/EXAM DESCRIPTION AND NUMBER OF VIEWS: Hepatobiliary scan. COMPARISON: 1/28/2019 PROCEDURE:  5.3 mCi Tc 99m-Choletec was administered intravenously, followed by 55 minutes of anterior planar imaging. FINDINGS: Tracer is seen within the liver. There is excretion of tracer  into the common duct and small bowel. The gallbladder was not visualized during initial 55 minutes of imaging. The patient was unable to continue with further imaging and was brought back 4 hours later for delayed images. Nonvisualization of the gallbladder on 4 hour delayed images is suggestive of cystic duct obstruction.     Nonvisualization of the gallbladder suggestive of cystic duct obstruction which can be seen in acute cholecystitis.     Dx-portable Fluoroscopy < 1 Hour    Result Date: 1/28/2019 1/28/2019 8:18 AM HISTORY/REASON FOR EXAM:  ERCP with stent placement, Ifrah See TECHNIQUE/EXAM DESCRIPTION AND NUMBER OF VIEWS: Portable fluoroscopy for less than one hour FINDINGS:      The portable fluoroscopy unit was obligated to the procedure for less than one hour.   Actual fluoro time was 1.3 minutes.     Portable fluoroscopy utilized for 1.3 minutes.       Micro:  Results     Procedure Component Value Units Date/Time    Fungal Culture [[id number]] Collected:  02/05/19 1135    Order Status:  Completed Specimen:  Body Fluid from Peritoneal Fluid Updated:  02/07/19 0916     Significant Indicator NEG     Source BF     Site PERITONEAL FLUID     Fungal Culture Culture in progress.    Narrative:       Collected By:[id number] ZULY HERNANDEZ  Please obtain from drain    FLUID CULTURE W/GRAM STAIN [[id number]] Collected:  02/05/19 1135    Order Status:  Completed Specimen:  Body Fluid Updated:  02/07/19 0916     Significant Indicator NEG     Source BF     Site PERITONEAL FLUID     Culture Result Bdf No growth at 48 hours     Gram Stain Result No organisms seen.    Narrative:       Collected By:[id number] ZULY HERNANDEZ  Please obtain from drain    CULTURE WOUND W/ GRAM STAIN [[id number]] Collected:  02/04/19 1345    Order Status:  Completed Specimen:  Wound Updated:  02/07/19 0812     Significant Indicator NEG     Source WND     Site Billiary     Culture Result Wound No growth at 72 hours     Gram Stain Result  "No organisms seen.    Narrative:       Anaerobic glass swab sent down to lab broken, only used Aerobic swab for  culture.    ANAEROBIC CULTURE [[id number]] Collected:  02/04/19 1345    Order Status:  Completed Specimen:  Wound Updated:  02/07/19 0812     Significant Indicator NEG     Source WND     Site Billiary     Anaerobic Culture, Culture Res Culture in progress.    Narrative:       Anaerobic glass swab sent down to lab broken, only used Aerobic swab for  culture.    GRAM STAIN [[id number]] Collected:  02/05/19 1135    Order Status:  Completed Specimen:  Body Fluid Updated:  02/05/19 1824     Significant Indicator .     Source BF     Site PERITONEAL FLUID     Gram Stain Result No organisms seen.    Narrative:       Collected By:[id number] ZULY HERNANDEZ  Please obtain from drain    FLUID CULTURE [[id number]] Collected:  02/05/19 1135    Order Status:  Canceled Specimen:  Other from Peritoneal Fluid Updated:  02/05/19 1140    Blood Culture [[id number]] Collected:  02/04/19 1703    Order Status:  Completed Specimen:  Blood from Peripheral Updated:  02/05/19 0850     Significant Indicator NEG     Source BLD     Site PERIPHERAL     Blood Culture No Growth    Note: Blood cultures are incubated for 5 days and  are monitored continuously.Positive blood cultures  are called to the RN and reported as soon as  they are identified.      Narrative:       Per Hospital Policy: Only change Specimen Src: to \"Line\" if  specified by physician order.    Blood Culture [[id number]] Collected:  02/04/19 1703    Order Status:  Completed Specimen:  Blood from Peripheral Updated:  02/05/19 0850     Significant Indicator NEG     Source BLD     Site PERIPHERAL     Blood Culture No Growth    Note: Blood cultures are incubated for 5 days and  are monitored continuously.Positive blood cultures  are called to the RN and reported as soon as  they are identified.      Narrative:       Per Hospital Policy: Only change Specimen Src: to \"Line\" " if  specified by physician order.    Urinalysis [[id number]]  (Abnormal) Collected:  02/05/19 0117    Order Status:  Completed Specimen:  Urine from Urine, Clean Catch Updated:  02/05/19 0126     Color Yellow     Character Clear     Specific Gravity 1.018     Ph 5.0     Glucose Negative mg/dL      Ketones 80 (A) mg/dL      Protein Negative mg/dL      Bilirubin Negative     Urobilinogen, Urine 0.2     Nitrite Negative     Leukocyte Esterase Negative     Occult Blood Negative     Micro Urine Req see below     Comment: Microscopic examination not performed when specimen is clear  and chemically negative for protein, blood, leukocyte esterase  and nitrite.         Narrative:       Collected By:[id number] NIKKY BOYKIN  If not done within the last 24 hours    BLOOD CULTURE [[id number]] Collected:  02/05/19 0000    Order Status:  Canceled Specimen:  Other from Peripheral     BLOOD CULTURE [[id number]] Collected:  02/05/19 0000    Order Status:  Canceled Specimen:  Other from Peripheral     GRAM STAIN [[id number]] Collected:  02/04/19 1345    Order Status:  Completed Specimen:  Wound Updated:  02/04/19 1724     Significant Indicator .     Source WND     Site Billiary     Gram Stain Result No organisms seen.    Narrative:       Anaerobic glass swab sent down to lab broken, only used Aerobic swab for  culture.    Blood Culture [[id number]]     Order Status:  Canceled Specimen:  Blood from Peripheral     Blood Culture [[id number]]     Order Status:  Canceled Specimen:  Blood from Peripheral           Assessment:  Active Hospital Problems    Diagnosis   • *Gangrenous cholecystitis [K81.0]   • Biliary stricture [K83.1]   • Liver abscess [K75.0]   • Transaminitis and hyperbilirubinemia [R74.0]   • Constipation [K59.00]   • Anemia [D64.9]   • Dyslipidemia [E78.5]   • Hyponatremia [E87.1]   • Hypothyroid [E03.9]   • Hypertension [I10]           ASSESSMENT:         73 y.o. admitted 2/2/2019. Pt has a past medical history of biliary  obstruction as a child, hypertension, hypothyroidism and hyperlipidemia.  She experienced left upper quadrant abdominal pain beginning in mid December and underwent ERCP.  She continued to have symptoms and again had ERCP 1/28 with metal stent placed.  She continued to have transaminitis abdominal pain and was transferred to [facility] for surgical exploration and intervention.  She is taken to the OR on 2/4.  The plan had been for exploratory laparoscopy however it was converted to open laparotomy.  Patient was found to have peritoneal abscess in the right upper quadrant subhepatic space.  She also was found to have gangrenous cholecystitis with large pocket of purulent material posterior to gallbladder and cholecystectomy was performed.  Hepatectomy and drainage was also performed due 3 cm hepatic abscess.  Intraoperative cultures were obtained.  However only 1 aerobic swab. Drain placed in the right upper quadrant adjacent to our cholecystectomy site, peritoneal abscess, and liver abscess.  Requested additional cultures from drain fluid.     Interval 24 hour assessment:    Events: Per surgery notes may replace biliary stent due to concern for possible obstruction.  AF, O2 RA,    Labs WBC 11, Alk phos 175, T bili 0.8-all improved  Micro reviewed    Pt continues on ceftriaxone 2 g every 24 and Flagyl 500 mg 3 times daily.  Her LFTs including alk phos and T bili have improved.  Will need to monitor LFTs going forward to do diagnosis and patient is on ceftriaxone. All OR and drain cultures remain negative.  Per patient and son plan for stent exchange on Friday.    Problems      Peritoneal abscess, right upper quadrant subhepatic space near gallbladder with pocket pf purulent fluid   Hepatic abscess, also with purulent fluid   Gangrenous cholecystitis   Leukocytosis peaked at 17 on 2/1-improving daily  Elevated liver enzymes, cholestatic pattern- improving          PLAN:      Continue ceftriaxone and Flagyl while  inpatient  --- Will plan on at least 7-10 days of antibiotics with repeat CT abdomen/pelvis in the next 2-3 weeks.  All cultures remain negative. She had been on antibiotics prior to operation.  On discharge will likely be able to transition to orals (cipro 500 mg bid and flagyl 500 mg tid)  no need for central line.   Follow-up OR culture   Follow-up cultures from drain - obtained less than 24 hours from placement so results should still be valid   Follow-up post-op blood cultures, no growth to date   --- Agree with stent replacement this would relieve obstruction and would also remove a potentially infected foreign body.  Patient has been on antibiotics for several days and new stent has much less risk of being colonized and reintroducing bacteria once antibiotics have stopped.         Plan of care discussed with MARISSA Donaldson M.D.. Will continue to follow     Eunice Marcos M.D.

## 2019-02-07 NOTE — CONSULTS
[facility]    DATE OF SERVICE:  02/07/2019    GASTROENTEROLOGY CONSULTATION    TIME:  8:42 a.m.    REFERRING PHYSICIAN:  Pablo Cannon MD    PRIMARY CARE PHYSICIAN:  Damien Phillips MD    GASTROENTEROLOGIST:  Cristofer Brandon MD    REASON FOR CONSULTATION:  Bile leak after open cholecystectomy.    HISTORY OF PRESENT ILLNESS:  This is a 73-year-old  female with   distant history of biliary duct stricture as a child presents with biliary   duct leak after cholecystectomy.  The patient developed obstructive jaundice   and common hepatic duct stricture and underwent ERCP with SpyGlass   cholangioscopy identifying a benign appearing shelf-like stricture.  Biopsies   were negative for malignancy.  Fully covered metallic biliary stent was placed   in late January for treatment with warnings to the patient of potentially   developing cholangitis, liver abscess and/or infection.  There is no choice in   placing an uncovered stent as we would want to avoid embedding of stent and   would need to have subsequent removal.  In addition, plastic biliary stent was   felt to be inadequate due to tightness of the stricture.  She was sent home   with Augmentin prophylactic antibiotics.  She then presented in early February   with cholecystitis.  She was found to have a liver abscess.  She underwent   open cholecystectomy and partial hepatectomy by Dr. Pablo Cannon,   which was complicated with a postoperative bile leak.  She has had extensive   BRAXTON drainage.  She is currently being treated with ceftriaxone and Flagyl and   LFTs have down trended as well as some improvement of her leukocytosis.    Otherwise, I was called by Dr. Pablo Cannon to consider removing   prior 8 cm long covered metallic biliary stent and placing a shorter comfort   biliary stent since he has concerns that the length of the stent is partially   occluding the part of the right intrahepatic ducts and worsening of the   biliary  duct leak.  Otherwise, the patient's abdominal pain symptoms have   improved after surgery.  She has a BRAXTON drain in place.  She denied any nausea,   vomiting, jaundice or dark urine.  She denies further modifying factors,   associated symptoms, or timing issues with her complaints.  I did speak to   patient's son at bedside to obtain further history and discuss patient's   management.    PAST MEDICAL HISTORY:  1.  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  2.  History of obstructive jaundice.  3.  Hypothyroidism.  4.  History of stroke, suspected small, per neurologist had a plaquing carotid   with no residual effects of stroke afterwards, treated with statins and   hypercholesterol medications, which were then stopped due to muscle spasms.  5.  Right bundle bisi block.  6.  Hypertension.  7.  Hypercholesterolemia.    PAST SURGICAL HISTORY:  Cholecystectomy 02/04/2019, exploratory laparotomy,   intraoperative ultrasound, peritoneal abscess and liver abscess drainage,   partial hepatectomy, laparoscopy for clot bile duct in 1953, ruptured   appendectomy in 1952.    FAMILY HISTORY:  Mother had head and neck cancer.  Denied family history of   biliary duct cancer, gallbladder cancer, pancreatic cancer.    SOCIAL HISTORY:  Ex-smoker, quit in 2000.  Drinks alcohol, 1-2 glasses of wine   per night, none since early January.  Denied illicit drug use and tattoos.    ALLERGIES:  No known drug allergies.    INPATIENT MEDICATIONS:  Lisinopril, Dilaudid p.r.n., Percocet p.r.n.,   Celebrex, omeprazole 20 mg p.o. daily, Synthroid 100 mcg p.o. daily,   promethazine p.r.n., Leonor-Colace, MiraLax, ceftriaxone, Zofran p.r.n., Flagyl.    REVIEW OF SYSTEMS:  As per HPI, past medical history, past surgical history   sections; otherwise, greater than 10 systems negative including   constitutional, head, ears, eyes, nose, throat, pulmonary, cardiovascular,   genitorectal,  hematological, endocrine, rheumatological, musculoskeletal, and   oncological in a musculoskeletal female.    PHYSICAL EXAMINATION:  VITAL SIGNS:  Temperature 97.5, respirations 17, pulse 60, blood pressure   150s/70, weight 120 pounds and height 63 inches.  GENERAL:  Well-developed, white female in no apparent distress, alert and   oriented.  HEENT:  Head atraumatic, normocephalic.  Eyes, extraocular movements intact,   nonicteric sclerae.  Nose, no erythema, no discharge.  Mouth, no erythema, no   discharge, no thrush noted.  Mallampati of 2.  NECK:  Supple.  No lymphadenopathy or JVD.  PULMONARY:  Clear to auscultation bilaterally.  CARDIOVASCULAR:  Regular rate and rhythm without murmur, rub or gallop.  ABDOMEN:  Nondistended, tender right upper quadrant, but no rebound, no   appreciable hepatosplenomegaly, ecchymosis or rebound.  DERMATOLOGIC:  No spider angiomas or palmar erythema.  NEUROLOGICAL:  No focal deficits appreciated.  No asterixis.  MUSCULOSKELETAL:  Moving all extremities.  Strength 5/5 throughout.  PSYCHIATRIC:  Appropriate mood, affect, interaction, and insight.    LABORATORY DATA:  WBC 11, hemoglobin 10, hematocrit 30, MCV 93.  Sodium 132,   glucose 104, BUN 5, calcium 7.7, alkaline phosphatase 175, albumin 2.8, total   protein 4.9.  INR 1.14.  Blood type A positive.  Otherwise, normal platelet   count, potassium chloride, bicarbonate, anion gap, creatinine, GFR greater   than 60, AST, ALT, total bilirubin 0.8.    IMAGIN2019, HIDA scan showed nonvisualization of gallbladder   suggestive cystic duct obstruction and acute cholecystitis.  Abdominal and   pelvic CT scan, pancreatic protocol 2018, right-sided retroperitoneal   air surrounding the right kidney, could be related to recent procedure,   stricture of mid common biliary duct to common hepatic duct with no obvious   compressing mass, gas and cystic duct and contrasting gallbladder likely   representing recent ERCP.  No mass  in the head of the pancreas.  No pancreatic   ductal dilatation.  EKG, 01/07/2019, sinus bradycardia, rate of 55, right   bundle-branch block.    IMPRESSION:  A 73-year-old  female who underwent cholecystectomy,   partial hepatectomy for gangrenous cholecystitis complicated with biliary duct   leak with indwelling metallic biliary stent for common hepatic duct   stricture.    After speaking with Dr. Pablo Cannon and reviewing imaging as well as   speaking with the patient and her son, I do think it is reasonable to proceed   ERCP with removal of prior metallic biliary stent and sweeping the duct of   balloon to remove any biliary sludge and/or choledocholithiasis and then   placing a shorter fully covered metallic biliary stent in case the right   intrahepatic duct are being partially occluded with the length of the covered   biliary stent and a shorter stent was likely to help this, but she does need a   persistent metallic biliary stent since her common hepatic duct stricture   seems still very tight and had had an hour glass appearance on intraoperative   ultrasound per Dr. Cannon.  I have explained to patient the risks,   benefits, and alternatives of doing this and her other therapeutic options as   well as explained to her the potential risk of cholangitis and liver abscess   with cholecystitis as she developed a post-metallic biliary stent placement   and that was the reason why I sent her home with Augmentin, but we had no   choice because of the tightness of her stricture and the unlikely ability to   treat this with plastic biliary stent and also the inability to use uncovered   metallic stent since this would embed in tissue and not be able to remove it   later.  She stated understanding and told me that it was not my fault.    Otherwise, proceeding with repeat ERCP as described above would be reasonable.    PROBLEMS:  1.  Biliary duct leak after open cholecystectomy and partial  hepatectomy.  2.  Hepatic abscess, treated with antibiotics and surgery.  3.  Common hepatic duct biliary stricture with indwelling fully covered   metallic biliary stent.  4.  Acute gangrenous cholecystitis treated by cholecystectomy.  5.  Abnormal biliary duct imaging.  6.  History of obstructive jaundice.  7.  Intraabdominal sepsis infection with abdominal wound VAC in place,   treated.  8.  Multiple comorbidities as per above.    RECOMMENDATIONS:  1.  Continue antibiotics.  2.  Keep n.p.o. after midnight.  3.  Proceed with ERCP with removal of prior metallic biliary stent and   changing to a shorter fully covered metallic biliary stent.  Risks, benefits,   and alternatives were discussed with patient and patient's son.  They were   given opportunity to ask questions and discuss other options, risks including   but not limited to perforation, infection, bleeding, missed lesions, possible   need for surgery, cardiopulmonary event, aspiration, stroke, hospitalization,   possibly prolonged discomfort, possibly repeat procedures, additional testing,   pancreatitis, contrast reaction, radiation exposure, failed or incomplete   ERCP, stent migration or occlusion, liver abscess cholangitis, ineffective   therapy or persistent stricture or other potential life-threatening   complications.  Discussion was undertaken in layman's terms.  They stated   clear understanding and acceptance risk and wished to proceed with procedures   as detailed in this note.  Consult was given by them in clear state of mind.    Dear Dr. Pablo Cannon MD,    Thank you for asking me to evaluate the patient in consultation.  Please refer   to my consult note as per above for details of recommendations.  Please feel   free to call us with any questions.       ____________________________________     MD YANELI AMEZCUA / LAVELLE    DD:  02/07/2019 08:57:08  DT:  02/07/2019 11:35:53    D#:  [id number]  Job#:  [id number]    cc: Pablo  Kassandra MARTINEZ, Torey Donaldson MD, SALLIE BURROWS MD

## 2019-02-08 ENCOUNTER — APPOINTMENT (OUTPATIENT)
Dept: RADIOLOGY | Facility: MEDICAL CENTER | Age: 74
DRG: 405 | End: 2019-02-08
Attending: INTERNAL MEDICINE
Payer: MEDICARE

## 2019-02-08 LAB
ALBUMIN SERPL BCP-MCNC: 3 G/DL (ref 3.2–4.9)
ALBUMIN/GLOB SERPL: 1.1 G/DL
ALP SERPL-CCNC: 155 U/L (ref 30–99)
ALT SERPL-CCNC: 40 U/L (ref 2–50)
ANION GAP SERPL CALC-SCNC: 9 MMOL/L (ref 0–11.9)
AST SERPL-CCNC: 30 U/L (ref 12–45)
BACTERIA FLD AEROBE CULT: NORMAL
BASOPHILS # BLD AUTO: 0.4 % (ref 0–1.8)
BASOPHILS # BLD: 0.04 K/UL (ref 0–0.12)
BILIRUB SERPL-MCNC: 0.8 MG/DL (ref 0.1–1.5)
BUN SERPL-MCNC: 3 MG/DL (ref 8–22)
CALCIUM SERPL-MCNC: 7.7 MG/DL (ref 8.5–10.5)
CHLORIDE SERPL-SCNC: 99 MMOL/L (ref 96–112)
CO2 SERPL-SCNC: 25 MMOL/L (ref 20–33)
CREAT SERPL-MCNC: 0.54 MG/DL (ref 0.5–1.4)
EOSINOPHIL # BLD AUTO: 0.09 K/UL (ref 0–0.51)
EOSINOPHIL NFR BLD: 0.9 % (ref 0–6.9)
ERYTHROCYTE [DISTWIDTH] IN BLOOD BY AUTOMATED COUNT: 44.4 FL (ref 35.9–50)
GLOBULIN SER CALC-MCNC: 2.7 G/DL (ref 1.9–3.5)
GLUCOSE SERPL-MCNC: 97 MG/DL (ref 65–99)
GRAM STN SPEC: NORMAL
HCT VFR BLD AUTO: 30.9 % (ref 37–47)
HGB BLD-MCNC: 10.3 G/DL (ref 12–16)
IMM GRANULOCYTES # BLD AUTO: 0.25 K/UL (ref 0–0.11)
IMM GRANULOCYTES NFR BLD AUTO: 2.6 % (ref 0–0.9)
LYMPHOCYTES # BLD AUTO: 1.12 K/UL (ref 1–4.8)
LYMPHOCYTES NFR BLD: 11.8 % (ref 22–41)
MCH RBC QN AUTO: 30.4 PG (ref 27–33)
MCHC RBC AUTO-ENTMCNC: 33.3 G/DL (ref 33.6–35)
MCV RBC AUTO: 91.2 FL (ref 81.4–97.8)
MONOCYTES # BLD AUTO: 0.83 K/UL (ref 0–0.85)
MONOCYTES NFR BLD AUTO: 8.7 % (ref 0–13.4)
NEUTROPHILS # BLD AUTO: 7.18 K/UL (ref 2–7.15)
NEUTROPHILS NFR BLD: 75.6 % (ref 44–72)
NRBC # BLD AUTO: 0 K/UL
NRBC BLD-RTO: 0 /100 WBC
PLATELET # BLD AUTO: 386 K/UL (ref 164–446)
PMV BLD AUTO: 8.9 FL (ref 9–12.9)
POTASSIUM SERPL-SCNC: 3.2 MMOL/L (ref 3.6–5.5)
PROT SERPL-MCNC: 5.7 G/DL (ref 6–8.2)
RBC # BLD AUTO: 3.39 M/UL (ref 4.2–5.4)
SIGNIFICANT IND 70042: NORMAL
SITE SITE: NORMAL
SODIUM SERPL-SCNC: 133 MMOL/L (ref 135–145)
SOURCE SOURCE: NORMAL
WBC # BLD AUTO: 9.5 K/UL (ref 4.8–10.8)

## 2019-02-08 PROCEDURE — 0F778DZ DILATION OF COMMON HEPATIC DUCT WITH INTRALUMINAL DEVICE, VIA NATURAL OR ARTIFICIAL OPENING ENDOSCOPIC: ICD-10-PCS | Performed by: INTERNAL MEDICINE

## 2019-02-08 PROCEDURE — 160036 HCHG PACU - EA ADDL 30 MINS PHASE I: Performed by: INTERNAL MEDICINE

## 2019-02-08 PROCEDURE — 99232 SBSQ HOSP IP/OBS MODERATE 35: CPT | Performed by: HOSPITALIST

## 2019-02-08 PROCEDURE — 700101 HCHG RX REV CODE 250

## 2019-02-08 PROCEDURE — 502240 HCHG MISC OR SUPPLY RC 0272: Performed by: INTERNAL MEDICINE

## 2019-02-08 PROCEDURE — C1874 STENT, COATED/COV W/DEL SYS: HCPCS | Performed by: INTERNAL MEDICINE

## 2019-02-08 PROCEDURE — BF101ZZ FLUOROSCOPY OF BILE DUCTS USING LOW OSMOLAR CONTRAST: ICD-10-PCS | Performed by: INTERNAL MEDICINE

## 2019-02-08 PROCEDURE — 700105 HCHG RX REV CODE 258: Performed by: INTERNAL MEDICINE

## 2019-02-08 PROCEDURE — 160203 HCHG ENDO MINUTES - 1ST 30 MINS LEVEL 4: Performed by: INTERNAL MEDICINE

## 2019-02-08 PROCEDURE — 99232 SBSQ HOSP IP/OBS MODERATE 35: CPT | Performed by: INTERNAL MEDICINE

## 2019-02-08 PROCEDURE — 160048 HCHG OR STATISTICAL LEVEL 1-5: Performed by: INTERNAL MEDICINE

## 2019-02-08 PROCEDURE — 36415 COLL VENOUS BLD VENIPUNCTURE: CPT

## 2019-02-08 PROCEDURE — 700102 HCHG RX REV CODE 250 W/ 637 OVERRIDE(OP): Performed by: SURGERY

## 2019-02-08 PROCEDURE — 700101 HCHG RX REV CODE 250: Performed by: INTERNAL MEDICINE

## 2019-02-08 PROCEDURE — 160002 HCHG RECOVERY MINUTES (STAT): Performed by: INTERNAL MEDICINE

## 2019-02-08 PROCEDURE — 160009 HCHG ANES TIME/MIN: Performed by: INTERNAL MEDICINE

## 2019-02-08 PROCEDURE — 700111 HCHG RX REV CODE 636 W/ 250 OVERRIDE (IP): Performed by: INTERNAL MEDICINE

## 2019-02-08 PROCEDURE — 0FC98ZZ EXTIRPATION OF MATTER FROM COMMON BILE DUCT, VIA NATURAL OR ARTIFICIAL OPENING ENDOSCOPIC: ICD-10-PCS | Performed by: INTERNAL MEDICINE

## 2019-02-08 PROCEDURE — 80053 COMPREHEN METABOLIC PANEL: CPT

## 2019-02-08 PROCEDURE — 700105 HCHG RX REV CODE 258

## 2019-02-08 PROCEDURE — 85025 COMPLETE CBC W/AUTO DIFF WBC: CPT

## 2019-02-08 PROCEDURE — A9270 NON-COVERED ITEM OR SERVICE: HCPCS | Performed by: SURGERY

## 2019-02-08 PROCEDURE — 160035 HCHG PACU - 1ST 60 MINS PHASE I: Performed by: INTERNAL MEDICINE

## 2019-02-08 PROCEDURE — A9270 NON-COVERED ITEM OR SERVICE: HCPCS | Performed by: HOSPITALIST

## 2019-02-08 PROCEDURE — 700102 HCHG RX REV CODE 250 W/ 637 OVERRIDE(OP): Performed by: HOSPITALIST

## 2019-02-08 PROCEDURE — 110371 HCHG SHELL REV 272: Performed by: INTERNAL MEDICINE

## 2019-02-08 PROCEDURE — 0FPB8DZ REMOVAL OF INTRALUMINAL DEVICE FROM HEPATOBILIARY DUCT, VIA NATURAL OR ARTIFICIAL OPENING ENDOSCOPIC: ICD-10-PCS | Performed by: INTERNAL MEDICINE

## 2019-02-08 PROCEDURE — 160208 HCHG ENDO MINUTES - EA ADDL 1 MIN LEVEL 4: Performed by: INTERNAL MEDICINE

## 2019-02-08 PROCEDURE — 74328 X-RAY BILE DUCT ENDOSCOPY: CPT

## 2019-02-08 PROCEDURE — 700111 HCHG RX REV CODE 636 W/ 250 OVERRIDE (IP)

## 2019-02-08 PROCEDURE — 500066 HCHG BITE BLOCK, ECT: Performed by: INTERNAL MEDICINE

## 2019-02-08 PROCEDURE — 770001 HCHG ROOM/CARE - MED/SURG/GYN PRIV*

## 2019-02-08 DEVICE — STENT WALLFLEX BILIARY RX FC RMV US 10X60: Type: IMPLANTABLE DEVICE | Site: ESOPHAGUS | Status: FUNCTIONAL

## 2019-02-08 RX ORDER — ACETAMINOPHEN 500 MG
500 TABLET ORAL
Status: DISCONTINUED | OUTPATIENT
Start: 2019-02-08 | End: 2019-02-08 | Stop reason: HOSPADM

## 2019-02-08 RX ORDER — SODIUM CHLORIDE, SODIUM LACTATE, POTASSIUM CHLORIDE, CALCIUM CHLORIDE 600; 310; 30; 20 MG/100ML; MG/100ML; MG/100ML; MG/100ML
INJECTION, SOLUTION INTRAVENOUS CONTINUOUS
Status: DISCONTINUED | OUTPATIENT
Start: 2019-02-08 | End: 2019-02-08 | Stop reason: HOSPADM

## 2019-02-08 RX ORDER — SODIUM CHLORIDE 9 MG/ML
INJECTION, SOLUTION INTRAVENOUS
Status: COMPLETED
Start: 2019-02-08 | End: 2019-02-08

## 2019-02-08 RX ORDER — HALOPERIDOL 5 MG/ML
0.5 INJECTION INTRAMUSCULAR
Status: DISCONTINUED | OUTPATIENT
Start: 2019-02-08 | End: 2019-02-08 | Stop reason: HOSPADM

## 2019-02-08 RX ORDER — ONDANSETRON 2 MG/ML
4 INJECTION INTRAMUSCULAR; INTRAVENOUS
Status: DISCONTINUED | OUTPATIENT
Start: 2019-02-08 | End: 2019-02-08 | Stop reason: HOSPADM

## 2019-02-08 RX ORDER — MEPERIDINE HYDROCHLORIDE 25 MG/ML
12.5 INJECTION INTRAMUSCULAR; INTRAVENOUS; SUBCUTANEOUS
Status: DISCONTINUED | OUTPATIENT
Start: 2019-02-08 | End: 2019-02-08 | Stop reason: HOSPADM

## 2019-02-08 RX ADMIN — METRONIDAZOLE 500 MG: 500 INJECTION, SOLUTION INTRAVENOUS at 21:44

## 2019-02-08 RX ADMIN — CELECOXIB 100 MG: 100 CAPSULE ORAL at 05:15

## 2019-02-08 RX ADMIN — METRONIDAZOLE 500 MG: 500 INJECTION, SOLUTION INTRAVENOUS at 14:59

## 2019-02-08 RX ADMIN — CEFTRIAXONE SODIUM 2 G: 2 INJECTION, POWDER, FOR SOLUTION INTRAMUSCULAR; INTRAVENOUS at 05:14

## 2019-02-08 RX ADMIN — AMLODIPINE BESYLATE 5 MG: 10 TABLET ORAL at 05:15

## 2019-02-08 RX ADMIN — METRONIDAZOLE 500 MG: 500 INJECTION, SOLUTION INTRAVENOUS at 05:23

## 2019-02-08 RX ADMIN — LEVOTHYROXINE SODIUM 100 MCG: 50 TABLET ORAL at 05:14

## 2019-02-08 RX ADMIN — SODIUM CHLORIDE 500 ML: 9 INJECTION, SOLUTION INTRAVENOUS at 21:44

## 2019-02-08 RX ADMIN — OMEPRAZOLE 20 MG: 20 CAPSULE, DELAYED RELEASE ORAL at 05:14

## 2019-02-08 RX ADMIN — LISINOPRIL 40 MG: 20 TABLET ORAL at 05:14

## 2019-02-08 ASSESSMENT — ENCOUNTER SYMPTOMS
PALPITATIONS: 0
CONSTIPATION: 0
FALLS: 0
DIARRHEA: 0
MYALGIAS: 0
INSOMNIA: 0
BLOOD IN STOOL: 0
NERVOUS/ANXIOUS: 0
WEAKNESS: 0
COUGH: 0
NAUSEA: 0
DIAPHORESIS: 0
DIZZINESS: 0
ABDOMINAL PAIN: 0
CHILLS: 0
FEVER: 0
WHEEZING: 0
HEADACHES: 0
VOMITING: 0
ABDOMINAL PAIN: 1
SPUTUM PRODUCTION: 0
SHORTNESS OF BREATH: 0
HEMOPTYSIS: 0

## 2019-02-08 NOTE — PROGRESS NOTES
Pt back from PACU    AOx4  On room air    Straight off the gurney and to the bathroom with a SBA    Son at bedside    PIV running TKO fluids.       Review post op orders now

## 2019-02-08 NOTE — CARE PLAN
Problem: Infection  Goal: Will remain free from infection  Patient continues on IV antibiotics. No adverse reactions noted.    Problem: Pain Management  Goal: Pain level will decrease to patient's comfort goal  Outcome: PROGRESSING AS EXPECTED

## 2019-02-08 NOTE — PROCEDURES
Pre-procedure Diagnoses:   Biliary stricture [K83.1]   Postoperative bile leak [K91.89, K83.8]   Hepatic abscess [K75.0]   Acute gangrenous cholecystitis [K81.0]   Post-procedure Diagnoses:   Biliary stricture [K83.1]   Choledocholithiasis [K80.50]   Procedures:   ENDOSCOPIC RETROGRADE CHOLANGIOPANCREATOGRAPHY (ERCP) WITH REMOVAL OF PRIOR STENT REMOVAL [41366 (CPT®)]   ENDOSCOPIC RETROGRADE CHOLANGIOPANCREATOGRAPHY (ERCP) WITH REMOVAL STONES/SLUDEG FROM BILIARY DUCTS [22053 (CPT®)]   ENDOSCOPIC RETROGRADE CHOLANGIOPANCREATOGRAPHY (ERCP) NEW FULLY-COVERED METALLIC BILIARY STENT PLACEMENT [60306 (CPT®)]   CHOLANGIOGRAM OR X-RAY FOR BILE DUCT ENDOSCOPY [84291 (CPT®)]       Endoscopist: Haroon Moore MD, [facility], [facility]    Anesthesiologist: Dr. Sanjana Bolanos      Consent: Risks, benefits, and alternatives were discussed with patient and son. Consenting persons were given an opportunity to ask questions and discuss other options. Risks including but not limited to cholangitis, hepatic abscess, pancreatitis, contrast reaction, radiation exposure, stent migration and/or occlusion, perforation, infection, bleeding, missed lesion(s), cardiac and/or pulmonary event, aspiration, hypoxia, stroke, possible need for surgery and/or interventional radiology, hospitalization possibly prolonged, discomfort, unsuccessful and/or incomplete procedure, indefinite diagnosis, ineffective therapy and/or persistent symptoms, possible need for repeat procedures and/or additional testings, damage to adjacent organs and/or vascular structures, medication reaction, disability, death, and other adverse events possibly life-threatening. Discussion was undertaken with Layman's terms. Consenting persons stated understanding and acceptance of these risks, and wished to proceed. Informed consent was given in clear state of mind.     Endoscopic procedures in detail: ERCP scope was inserted from mouth to 2nd portion of the duodenum. Prior metallic biliary  "stent was grasped with a rat-tooth forceps and scope and stent were removed as one unit.   Biliary duct was selectively cannulated on the first attempt with a balloon and guidewire, successful free cannulation was achieved.  Cholangiogram was injected and completed.  Prior biliary sphincterotomy was widely patent.  Balloon was inflated and used to drag the biliary duct with removal of small choledocholithiais or sludge.  Guidewire was threaded pass the common hepatic duct stricture and then balloon was exchanged a new stent introducer device,  A fully covered 10mm x 6cm biliary metallic stent was placed under fluroscopic and endoscopic guidance.  Post-stent deployment position was ideal with waist in the middle of the stent and stent protruding out the biliary os with a several cages into the duodenum, proximal end was lower than prior stent and was now in the proximal common hepatic duct.  Injected contrast drained well.  The C-arm was moved and rotated on rainbow axis to optimize imaging of intrahepatic biliary systems in different angles to avoid missing lesions/strictures with ductal overlap and/or image angulation. The ERCP scope was removed from duodenum to also image the common bile duct \"behind\" the ERCP scope.  Of note, no radiologist was present to help obtain nor read ERCP images.  I was the sole physician who obtained and performed interpretation of static and dynamic ERCP fluoroscopic x-ray images, no over-read was requested from the radiologist nor was it necessary.  There was suction of insufflated air and stomach fluid contents upon removal.     Procedure times:  - In-room 12:50  - Start 13:02  - Completed 13:17  - Out of room per nursing records     Endoscopic Retrograde CholangioPancreatography Findings:  - Ampulla of Vater: located in 2nd portion of duodenum adjacent to the right of a small periampullary diverticulum.  Prior biliary sphincterotomy widely patent.  - Cholangiogram: common hepatic " duct shelf-like short and tight stricture just upstream from level of cystic duct takeoff.  No evidence of other intrahepatic strictures.  - Pancreatogram: intentionally not injected nor cannulated.  - Therapy: prior 8cm long metallic biliary stent removed intact, balloon choledocholithiasis extraction, new shorter 6cm fully covered metallic biliary stent placement.     Impression:  1. Common hepatic duct stricture, suspected benign from prior bile duct surgery in distant past  2. Exchanged for shorter 6cm fully-covered metallic biliary stent  3. Choledocholithiasis biliary sludge balloon extracted  4. Patient prior biliary sphincterotomy  5. Juxta-ampullary duodenal diverticulum     Recommendations:   1.  Routine post-endoscopy anesthesia recovery care. Transfer patient back to prior hospital room when she is awake, alert and comfortable, when recovery criteria are met.  Aspiration and fall precautions x 24 hours.   2.  Continue with antibiotics.  3.  Recall ERCP in 2 months for stent removal.  4.  I spoke to patient, son, Dr. Cannon and recovery nurse about impression, diagnosis and recommendations.

## 2019-02-08 NOTE — DISCHARGE PLANNING
Anticipated Discharge Disposition: Home with home health    ? Action: RN CM left a message for pt's current PCP, Dr Damien Phillips [phone]), received call back that Dr Phillips will be out of the office until March 1.  CHARITO COLLINS inquired who is covering for this PCP and if that physician could manage the Home Health, and is waiting for a return call.   ? CHARITO COLLINS spoke by phone with Laverne, pt's daughter, who was about to get on a plane, Laverne confirmed that pt will be going to [place].  CHARITO COLLINS discussed with Laverne that if pt goes to [place] she might need to be set up with a new PCP.  Laverne did not know the  address in [place] pt will be discharged to.  Laverne provided information regarding pt's former PCP Maritza Osorio ([phone]) in [place], RN CM spoke with staff.  Pt was discharged from that physician's care, she would be designated as a new pt, and this PCP is not taking new patients. Other PCP's in the practice are scheduling into April.  Contacted Clinic in [place] ([phone]) suggested by staff, the PCP's there are scheduling into April also.    RN CM then received voice mail from Laverne with address in [place] and Laverne's brother Osvaldo's phone number, however the voice mail was muffled and RN CM could not decipher those portions of the message.    CHARITO COLLINS spoke with Ariadna at intake at [facility] in [place] [phone], fax [phone], she states that based on pt location in [place] and insurance coverage, they would be able to possibly follow the pt.  Will need to complete choice, if pt is agreeable (pt is off the unit in surgery).    RN CM notified hospitalist of status of Home Health process.    Barriers to Discharge: Home Health choice completed and PCP confirmation of ability to follow up    Plan: Rn CM will continue to follow up

## 2019-02-08 NOTE — PROGRESS NOTES
Bedside report received.  Assessment complete.    A&O x 4. Patient calls appropriately. Family at bedside.   Patient ambulates with standby assist.   Patient declines pain medication at this time.   Denies N&V. Tolerating regular diet.  Surgical incision upper abdomen with prevena in place. BRAXTON drain right lower quadrant. New dressing applied.    + void, + flatus.  Patient denies SOB.    Review plan with of care with patient. Call light and personal belongings with in reach. Hourly rounding in place. All needs met at this time.

## 2019-02-08 NOTE — OR NURSING
1327 Pt arrived from OR with Dr. Bolanos.  Pt VSS, PIV infusing without issue.    1340 Pt denies pain/nausea. Dr. Moore at bedside to update pt and son.    1346 Pt tolerating sips of water well, pt son brought to bedside.    1350 Report to Tarah MCNEILL. Pt moved to room air.    1352 Report to Catherine MCNEILL.

## 2019-02-08 NOTE — PROGRESS NOTES
AOx4  Irritable    Pain declined    Pt on room air. Lungs clear. Denies SOB    Bowels hyperactive x4. +  Gas. Pt experiencing multiple loose bowel movements  NPO for stent replacement today    PIV was saline locked.     BRAXTON assessed to the RLQ- scant drainage. New dressing today    Prevena in place and running well.     POC discussed and pt calls appropriately.

## 2019-02-08 NOTE — PROGRESS NOTES
Hospital Medicine Daily Progress Note    Date of Service  2/8/2019    Chief Complaint  73 y.o. female admitted 2/2/2019 with abdominal pain    Hospital Course    Remote history of biliary surgery at 8yrs old  History of biliary stricture, stricture right above the pancreatic head, obvious with extreme dilation of   the extrahepatic ducts and intrahepatic ducts, several ERCPs most recent 1/31 done by Dr. Moore, GI where   he was able to cannulate through this very tight stricture in the common hepatic duct with upstream dilation of the biliary system with a wire and then placed a fully covered metal stent with a plan  to repeat, it was to obviously bridge this stricture; also follows Dr. Londono, Surgery.   Admitted 1/31 to [facility] for RUQ pain with CT imaging from outlying facility.with biliary obstruction, gallbladder distention.   HIDA scan showed nonvisualization of the gallbladder suggestive of cystic duct obstruction which can be seen in acute cholecystitis.  Dr. Londono, Surgery consulted 2/1,  transferred to [facility] 2/3.  She underwent laparotomy with drainage of peritoneal abscess in subhepatic space, open cholecystectomy for gangrenous cholecystitis and hepatic abscess drainage.      Interval Problem Update  Biliary stent replacement done today. Patient tolerated procedure well  Family still unsure of where to take patient upon discharge which is holding up  arrangement  She is otherwise comfortable without any complaints today       Consultants/Specialty  GI  Surgery  ID    Code Status  full    Disposition  May need advanced level of care postoperatively  PT/OT ordered.    Review of Systems  Review of Systems   Constitutional: Negative for chills and fever.   Respiratory: Negative for cough, hemoptysis, shortness of breath and wheezing.    Cardiovascular: Negative for chest pain and palpitations.   Gastrointestinal: Positive for abdominal pain. Negative for blood in stool,  constipation, diarrhea, nausea and vomiting.   Genitourinary: Negative for dysuria, frequency and hematuria.   Musculoskeletal: Negative for falls, joint pain and myalgias.   Neurological: Negative for dizziness, weakness and headaches.   Psychiatric/Behavioral: The patient is not nervous/anxious and does not have insomnia.    All other systems reviewed and are negative.       Physical Exam  Temp:  [36.4 °C (97.5 °F)-37.4 °C (99.4 °F)] 36.9 °C (98.5 °F)  Pulse:  [60-86] 65  Resp:  [0-20] 18  BP: (148-185)/(57-81) 148/70  SpO2:  [92 %-100 %] 93 %    Physical Exam   Constitutional: She is oriented to person, place, and time. She appears well-developed.   Thin, elderly     HENT:   Head: Normocephalic and atraumatic.   Eyes: Conjunctivae are normal. No scleral icterus.   Cardiovascular: Normal rate and regular rhythm.    No murmur heard.  Pulmonary/Chest: Effort normal and breath sounds normal. No respiratory distress. She has no wheezes.   Abdominal: Soft. Bowel sounds are normal. She exhibits no distension. There is tenderness (Mild, RUQ). There is no rebound and no guarding.   Musculoskeletal: She exhibits no edema or tenderness.   Neurological: She is alert and oriented to person, place, and time.   Skin: Skin is warm.   Psychiatric: She has a normal mood and affect. Her behavior is normal.   Nursing note and vitals reviewed.      Fluids    Intake/Output Summary (Last 24 hours) at 02/08/19 1537  Last data filed at 02/08/19 1318   Gross per 24 hour   Intake              460 ml   Output              940 ml   Net             -480 ml       Laboratory  Recent Labs      02/06/19   0322  02/07/19   0407  02/08/19   0411   WBC  11.1*  11.0*  9.5   RBC  3.07*  3.26*  3.39*   HEMOGLOBIN  9.6*  10.0*  10.3*   HEMATOCRIT  28.5*  30.0*  30.9*   MCV  92.8  92.0  91.2   MCH  31.3  30.7  30.4   MCHC  33.7  33.3*  33.3*   RDW  44.1  43.8  44.4   PLATELETCT  234  301  386   MPV  9.2  8.9*  8.9*     Recent Labs      02/06/19   0322   02/07/19   0407  02/08/19   0411   SODIUM  128*  132*  133*   POTASSIUM  4.0  3.9  3.2*   CHLORIDE  98  99  99   CO2  25  26  25   GLUCOSE  110*  104*  97   BUN  5*  5*  3*   CREATININE  0.45*  0.54  0.54   CALCIUM  7.6*  7.7*  7.7*                   Imaging  [id number] STENT - TUBE    (Results Pending)        Assessment/Plan  * Gangrenous cholecystitis- (present on admission)   Assessment & Plan    2/4 Underwent open cholecystectomy for gangrenous cholecystitis, drainage of liver abscess, and drainage of peritoneal abscess   Improvement in pain   Day 5 antibiotics of 7-10 per ID. Ok to switch to PO cipro and flagyl on discharge   repeat CT abdomen/pelvis in the next 2-3 weeks (around feb 21)  Cont antibiotics     Liver abscess   Assessment & Plan    S/p drainage on 2/4  Cont antibiotics  I discussed case with ID and Will need repeat CT scan prior to d/c antibiotics.      Biliary stricture   Assessment & Plan    Chronic, with possible stent malfunction  Stent replacement done on 2/8     Transaminitis and hyperbilirubinemia- (present on admission)   Assessment & Plan    Due to above  Resolving     Anemia   Assessment & Plan    Mild, no active bleeding.  Did have a Hb drop. Likely dilutional  Ordered iron studies wnl     Constipation   Assessment & Plan    resolved     Dyslipidemia   Assessment & Plan    Statin held for now because of transaminitis     Hyponatremia- (present on admission)   Assessment & Plan    Mild, stable     Hypothyroid- (present on admission)   Assessment & Plan    Continue levothyroxine     Hypertension- (present on admission)   Assessment & Plan    Stable  On lisinopril          VTE prophylaxis: SCDs

## 2019-02-08 NOTE — THERAPY
Occupational Therapy Contact Note:    Attempted to see pt for OT tx, pt out of room at procedure. Will round back as able.     Jaky Alicea OTR/L  Pager: [phone]

## 2019-02-08 NOTE — PROGRESS NOTES
Infectious Disease Progress Note    Author: VIKASH Gray Date & Time of service: 2019  3:14 PM    Chief Complaint:  Abdominal abscesses     Interval History:   AF, O2 1 L NC,  Labs WBC 11.1 stable, LFTs improved   Micro reviewed    Interval 24 hours of Vitals/Labs/Micro,and imaging results reviewed and compared to prior.  See assessment.   - Tm 99.4, WBC 9.5, states she has a little abdominal soreness but no real pain, no issue with antibiotics, getting ready to go down for biliary stent exchange now, no issue with antibiotics.    Labs, medications and wounds reviewed.     Review of Systems:  Review of Systems   Constitutional: Negative for chills, diaphoresis and fever.   Respiratory: Negative for cough, sputum production and shortness of breath.    Cardiovascular: Negative for chest pain and leg swelling.   Gastrointestinal: Negative for abdominal pain, constipation, diarrhea, nausea and vomiting.   Genitourinary: Negative for dysuria.   Musculoskeletal: Negative for joint pain and myalgias.   Skin: Negative for itching.       Hemodynamics:  Temp (24hrs), Av.9 °C (98.4 °F), Min:36.4 °C (97.5 °F), Max:37.4 °C (99.4 °F)  Temperature: 36.7 °C (98 °F)  Pulse  Av.5  Min: 56  Max: 86Heart Rate (Monitored): 62  Blood Pressure : 153/57, NIBP: 154/71       Physical Exam:  Physical Exam   Constitutional: She is oriented to person, place, and time. She appears well-developed and well-nourished. No distress.   HENT:   Head: Normocephalic and atraumatic.   Eyes: Conjunctivae and EOM are normal.   Neck: Normal range of motion. No tracheal deviation present.   Cardiovascular: Normal rate, regular rhythm, normal heart sounds and intact distal pulses.    No murmur heard.  Pulmonary/Chest: Effort normal and breath sounds normal. No respiratory distress.   Abdominal: Soft. Bowel sounds are normal. She exhibits no distension. There is no tenderness.   Sugar VAC to mid abdomen, no erythema to  surrounding tissue.    Right upper quadrant biliary drain with moderate brownish/green drainage.   Musculoskeletal: Normal range of motion. She exhibits no edema or tenderness.   Neurological: She is alert and oriented to person, place, and time.   Skin: Skin is warm and dry. No erythema.   Psychiatric: She has a normal mood and affect. Thought content normal.   Nursing note and vitals reviewed.      Meds:    Current Facility-Administered Medications:   •  amLODIPine  •  hydrALAZINE  •  HYDROmorphone  •  oxyCODONE-acetaminophen  •  celecoxib  •  omeprazole  •  lisinopril  •  sodium chloride  •  levothyroxine  •  Pharmacy Consult Request  •  promethazine  •  NS  •  ondansetron  •  senna-docusate **AND** polyethylene glycol/lytes **AND** magnesium hydroxide **AND** bisacodyl  •  cefTRIAXone (ROCEPHIN) IV  •  metroNIDAZOLE    Labs:  Recent Labs      02/06/19 0322 02/07/19 0407 02/08/19 0411   WBC  11.1*  11.0*  9.5   RBC  3.07*  3.26*  3.39*   HEMOGLOBIN  9.6*  10.0*  10.3*   HEMATOCRIT  28.5*  30.0*  30.9*   MCV  92.8  92.0  91.2   MCH  31.3  30.7  30.4   RDW  44.1  43.8  44.4   PLATELETCT  234  301  386   MPV  9.2  8.9*  8.9*   NEUTSPOLYS  79.00*  77.80*  75.60*   LYMPHOCYTES  7.70*  9.40*  11.80*   MONOCYTES  11.70  10.00  8.70   EOSINOPHILS  0.50  0.70  0.90   BASOPHILS  0.20  0.40  0.40     Recent Labs      02/06/19 0322 02/07/19 0407 02/08/19   0411   SODIUM  128*  132*  133*   POTASSIUM  4.0  3.9  3.2*   CHLORIDE  98  99  99   CO2  25  26  25   GLUCOSE  110*  104*  97   BUN  5*  5*  3*     Recent Labs      02/06/19 0322 02/07/19 0407 02/08/19   0411   ALBUMIN  2.6*  2.8*  3.0*   TBILIRUBIN  1.0  0.8  0.8   ALKPHOSPHAT  180*  175*  155*   TOTPROTEIN  4.5*  4.9*  5.7*   ALTSGPT  56*  49  40   ASTSGOT  50*  38  30   CREATININE  0.45*  0.54  0.54       Imaging:  No recent imaging.      Micro:  Results     Procedure Component Value Units Date/Time    Fungal Culture [[id number]] Collected:  02/05/19  1135    Order Status:  Completed Specimen:  Body Fluid from Peritoneal Fluid Updated:  02/08/19 0742     Significant Indicator NEG     Source BF     Site PERITONEAL FLUID     Fungal Culture Culture in progress.    Narrative:       Collected By:[id number] ZULY HERNANDEZ  Please obtain from drain    FLUID CULTURE W/GRAM STAIN [[id number]] Collected:  02/05/19 1135    Order Status:  Completed Specimen:  Body Fluid Updated:  02/08/19 0742     Significant Indicator NEG     Source BF     Site PERITONEAL FLUID     Culture Result Bdf No growth at 72 hours     Gram Stain Result No organisms seen.    Narrative:       Collected By:[id number] ZULY HERNANDEZ  Please obtain from drain    CULTURE WOUND W/ GRAM STAIN [[id number]] Collected:  02/04/19 1345    Order Status:  Completed Specimen:  Wound Updated:  02/07/19 0812     Significant Indicator NEG     Source WND     Site Billiary     Culture Result Wound No growth at 72 hours     Gram Stain Result No organisms seen.    Narrative:       Anaerobic glass swab sent down to lab broken, only used Aerobic swab for  culture.    ANAEROBIC CULTURE [[id number]] Collected:  02/04/19 1345    Order Status:  Completed Specimen:  Wound Updated:  02/07/19 0812     Significant Indicator NEG     Source WND     Site Billiary     Anaerobic Culture, Culture Res Culture in progress.    Narrative:       Anaerobic glass swab sent down to lab broken, only used Aerobic swab for  culture.    GRAM STAIN [[id number]] Collected:  02/05/19 1135    Order Status:  Completed Specimen:  Body Fluid Updated:  02/05/19 1824     Significant Indicator .     Source BF     Site PERITONEAL FLUID     Gram Stain Result No organisms seen.    Narrative:       Collected By:[id number] ZULY HERNANDEZ  Please obtain from drain    FLUID CULTURE [[id number]] Collected:  02/05/19 1135    Order Status:  Canceled Specimen:  Other from Peritoneal Fluid Updated:  02/05/19 1140    Blood Culture [[id number]] Collected:   "02/04/19 1703    Order Status:  Completed Specimen:  Blood from Peripheral Updated:  02/05/19 0850     Significant Indicator NEG     Source BLD     Site PERIPHERAL     Blood Culture No Growth    Note: Blood cultures are incubated for 5 days and  are monitored continuously.Positive blood cultures  are called to the RN and reported as soon as  they are identified.      Narrative:       Per Hospital Policy: Only change Specimen Src: to \"Line\" if  specified by physician order.    Blood Culture [[id number]] Collected:  02/04/19 1703    Order Status:  Completed Specimen:  Blood from Peripheral Updated:  02/05/19 0850     Significant Indicator NEG     Source BLD     Site PERIPHERAL     Blood Culture No Growth    Note: Blood cultures are incubated for 5 days and  are monitored continuously.Positive blood cultures  are called to the RN and reported as soon as  they are identified.      Narrative:       Per Hospital Policy: Only change Specimen Src: to \"Line\" if  specified by physician order.    Urinalysis [[id number]]  (Abnormal) Collected:  02/05/19 0117    Order Status:  Completed Specimen:  Urine from Urine, Clean Catch Updated:  02/05/19 0126     Color Yellow     Character Clear     Specific Gravity 1.018     Ph 5.0     Glucose Negative mg/dL      Ketones 80 (A) mg/dL      Protein Negative mg/dL      Bilirubin Negative     Urobilinogen, Urine 0.2     Nitrite Negative     Leukocyte Esterase Negative     Occult Blood Negative     Micro Urine Req see below     Comment: Microscopic examination not performed when specimen is clear  and chemically negative for protein, blood, leukocyte esterase  and nitrite.         Narrative:       Collected By:[id number] NIKKY BOYKIN  If not done within the last 24 hours    BLOOD CULTURE [[id number]] Collected:  02/05/19 0000    Order Status:  Canceled Specimen:  Other from Peripheral     BLOOD CULTURE [[id number]] Collected:  02/05/19 0000    Order Status:  Canceled Specimen:  Other from " Peripheral     GRAM STAIN [[id number]] Collected:  02/04/19 1795    Order Status:  Completed Specimen:  Wound Updated:  02/04/19 1724     Significant Indicator .     Source WND     Site Billiary     Gram Stain Result No organisms seen.    Narrative:       Anaerobic glass swab sent down to lab broken, only used Aerobic swab for  culture.    Blood Culture [[id number]]     Order Status:  Canceled Specimen:  Blood from Peripheral     Blood Culture [[id number]]     Order Status:  Canceled Specimen:  Blood from Peripheral           Assessment:  Active Hospital Problems    Diagnosis   • *Gangrenous cholecystitis [K81.0]   • Biliary stricture [K83.1]   • Liver abscess [K75.0]   • Transaminitis and hyperbilirubinemia [R74.0]        ASSESSMENT:      73 y.o. admitted 2/2/2019. Pt has a past medical history of biliary obstruction as a child, hypertension, hypothyroidism and hyperlipidemia.  She experienced left upper quadrant abdominal pain beginning in mid December and underwent ERCP.  She continued to have symptoms and again had ERCP 1/28 with metal stent placed.  She continued to have transaminitis abdominal pain and was transferred to [facility] for surgical exploration and intervention.  She is taken to the OR on 2/4.  The plan had been for exploratory laparoscopy however it was converted to open laparotomy.  Patient was found to have peritoneal abscess in the right upper quadrant subhepatic space.  She also was found to have gangrenous cholecystitis with large pocket of purulent material posterior to gallbladder and cholecystectomy was performed.  Hepatectomy and drainage was also performed due 3 cm hepatic abscess.  Intraoperative cultures were obtained.  However only 1 aerobic swab. Drain placed in the right upper quadrant adjacent to our cholecystectomy site, peritoneal abscess, and liver abscess.  Requested additional cultures from drain fluid.     Pt continues on ceftriaxone 2 g every 24 and Flagyl 500 mg 3 times daily.   Her LFTs including alk phos and T bili have improved.  Will need to monitor LFTs going forward as patient is on ceftriaxone. All OR and drain cultures remain negative.  Per patient and son plan for stent exchange today.    Problems      Peritoneal abscess, right upper quadrant subhepatic space near gallbladder with pocket pf purulent fluid   Hepatic abscess, also with purulent fluid   Gangrenous cholecystitis   Leukocytosis peaked at 17 on 2/1-improving daily  Elevated liver enzymes, cholestatic pattern- improving          PLAN:      Continue IV Ceftriaxone and PO Flagyl while inpatient  --- Will plan on at least 7 more days of antibiotics, estimated end date 2/15/19  Repeat CT abdomen/pelvis in the next 2-3 weeks.    All cultures remain negative.   She had been on antibiotics prior to operation.    On discharge will likely be able to transition to orals (cipro 500 mg bid and flagyl 500 mg tid) through 2/15.  OR culture NGTD  Cultures from drain- NGTD  Post-op blood cultures- NGTD  Undergoing stent replacement today- will help to relieve obstruction and remove a potentially infected foreign Has been on antibiotics for several days- new stent has much less risk of being colonized and reintroducing bacteria once antibiotics have stopped.      Discussed case with CHARITO Liu.  Can DC home on p.o. antibiotics as noted above.

## 2019-02-08 NOTE — DISCHARGE PLANNING
Anticipated Discharge Disposition: Home with home health    Action: RN CM met with pt regarding Home Health choice, pt and her son state they have not yet decided on which family member's home pt will discharge to ([facility] vs [facility]).  Pt was leaving for her stent placement, pt's son states they will decided this weekend.    Barriers to Discharge: Home Health choice completion    Plan: Case management will continue to follow

## 2019-02-08 NOTE — DIETARY
Nutrition Services: Update   Day 6 of admit.  Clementine Hopper is a 73 y.o. female with admitting DX of Hyperbilirubinemia, Acute gangrenous cholecystitis, Bile duct obstruction, Peritoneal abscess     Pt is currently NPO for procedure this afternoon. Attempted to visit pt this morning, pt was sleeping. Pt was on a low fiber diet and per chart pt PO was %. Pt was also receiving Boost Plus TID with meals. Wt 2/8: 54.5 kg via stand up scale - wt is the same exact wt from admit.     Malnutrition Risk: No criteria noted at this time.     Recommendations/Plan:  1. Resume PO diet when medically feasible.   2. Encourage intake of meals  3. Document intake of all meals as % taken in ADL's to provide interdisciplinary communication across all shifts.   4. Monitor weight.  5. Nutrition rep will continue to see patient for ongoing meal and snack preferences.    RD following

## 2019-02-08 NOTE — OR NURSING
1352-assumed care of patient. Bedside report received from CHARITO Marsh. Patient resting quietly. Warm blankets given and cy paws for warmth.  Report called to floor.     1445-bp improved. Trans. To floor via anne. Son at bedside.

## 2019-02-09 ENCOUNTER — PATIENT OUTREACH (OUTPATIENT)
Dept: HEALTH INFORMATION MANAGEMENT | Facility: OTHER | Age: 74
End: 2019-02-09

## 2019-02-09 VITALS
WEIGHT: 120.15 LBS | TEMPERATURE: 98.1 F | HEIGHT: 63 IN | SYSTOLIC BLOOD PRESSURE: 132 MMHG | OXYGEN SATURATION: 97 % | RESPIRATION RATE: 18 BRPM | DIASTOLIC BLOOD PRESSURE: 59 MMHG | BODY MASS INDEX: 21.29 KG/M2 | HEART RATE: 61 BPM

## 2019-02-09 PROBLEM — K59.00 CONSTIPATION: Status: RESOLVED | Noted: 2019-02-04 | Resolved: 2019-02-09

## 2019-02-09 PROBLEM — K83.1 BILIARY STRICTURE: Status: RESOLVED | Noted: 2019-02-03 | Resolved: 2019-02-09

## 2019-02-09 PROBLEM — R74.01 TRANSAMINITIS: Status: RESOLVED | Noted: 2019-01-31 | Resolved: 2019-02-09

## 2019-02-09 PROBLEM — K81.0 GANGRENOUS CHOLECYSTITIS: Status: RESOLVED | Noted: 2018-12-11 | Resolved: 2019-02-09

## 2019-02-09 PROBLEM — K75.0 LIVER ABSCESS: Status: RESOLVED | Noted: 2019-02-03 | Resolved: 2019-02-09

## 2019-02-09 LAB
ALBUMIN SERPL BCP-MCNC: 2.7 G/DL (ref 3.2–4.9)
ALBUMIN/GLOB SERPL: 1.4 G/DL
ALP SERPL-CCNC: 133 U/L (ref 30–99)
ALT SERPL-CCNC: 34 U/L (ref 2–50)
ANION GAP SERPL CALC-SCNC: 8 MMOL/L (ref 0–11.9)
AST SERPL-CCNC: 25 U/L (ref 12–45)
BACTERIA BLD CULT: NORMAL
BACTERIA BLD CULT: NORMAL
BACTERIA SPEC ANAEROBE CULT: NORMAL
BASOPHILS # BLD AUTO: 0.5 % (ref 0–1.8)
BASOPHILS # BLD: 0.04 K/UL (ref 0–0.12)
BILIRUB SERPL-MCNC: 0.6 MG/DL (ref 0.1–1.5)
BUN SERPL-MCNC: 5 MG/DL (ref 8–22)
CALCIUM SERPL-MCNC: 7.9 MG/DL (ref 8.5–10.5)
CHLORIDE SERPL-SCNC: 101 MMOL/L (ref 96–112)
CO2 SERPL-SCNC: 24 MMOL/L (ref 20–33)
CREAT SERPL-MCNC: 0.53 MG/DL (ref 0.5–1.4)
EOSINOPHIL # BLD AUTO: 0.07 K/UL (ref 0–0.51)
EOSINOPHIL NFR BLD: 0.9 % (ref 0–6.9)
ERYTHROCYTE [DISTWIDTH] IN BLOOD BY AUTOMATED COUNT: 46.3 FL (ref 35.9–50)
GLOBULIN SER CALC-MCNC: 1.9 G/DL (ref 1.9–3.5)
GLUCOSE SERPL-MCNC: 94 MG/DL (ref 65–99)
HCT VFR BLD AUTO: 26.8 % (ref 37–47)
HGB BLD-MCNC: 8.8 G/DL (ref 12–16)
IMM GRANULOCYTES # BLD AUTO: 0.23 K/UL (ref 0–0.11)
IMM GRANULOCYTES NFR BLD AUTO: 2.8 % (ref 0–0.9)
LYMPHOCYTES # BLD AUTO: 1.14 K/UL (ref 1–4.8)
LYMPHOCYTES NFR BLD: 14 % (ref 22–41)
MAGNESIUM SERPL-MCNC: 2.1 MG/DL (ref 1.5–2.5)
MCH RBC QN AUTO: 30.6 PG (ref 27–33)
MCHC RBC AUTO-ENTMCNC: 32.8 G/DL (ref 33.6–35)
MCV RBC AUTO: 93.1 FL (ref 81.4–97.8)
MONOCYTES # BLD AUTO: 0.85 K/UL (ref 0–0.85)
MONOCYTES NFR BLD AUTO: 10.4 % (ref 0–13.4)
NEUTROPHILS # BLD AUTO: 5.81 K/UL (ref 2–7.15)
NEUTROPHILS NFR BLD: 71.4 % (ref 44–72)
NRBC # BLD AUTO: 0 K/UL
NRBC BLD-RTO: 0 /100 WBC
PLATELET # BLD AUTO: 320 K/UL (ref 164–446)
PMV BLD AUTO: 8.3 FL (ref 9–12.9)
POTASSIUM SERPL-SCNC: 3.3 MMOL/L (ref 3.6–5.5)
PROT SERPL-MCNC: 4.6 G/DL (ref 6–8.2)
RBC # BLD AUTO: 2.88 M/UL (ref 4.2–5.4)
SIGNIFICANT IND 70042: NORMAL
SITE SITE: NORMAL
SODIUM SERPL-SCNC: 133 MMOL/L (ref 135–145)
SOURCE SOURCE: NORMAL
WBC # BLD AUTO: 8.1 K/UL (ref 4.8–10.8)

## 2019-02-09 PROCEDURE — 700102 HCHG RX REV CODE 250 W/ 637 OVERRIDE(OP): Performed by: SURGERY

## 2019-02-09 PROCEDURE — 85025 COMPLETE CBC W/AUTO DIFF WBC: CPT

## 2019-02-09 PROCEDURE — 700111 HCHG RX REV CODE 636 W/ 250 OVERRIDE (IP): Performed by: INTERNAL MEDICINE

## 2019-02-09 PROCEDURE — 700105 HCHG RX REV CODE 258: Performed by: INTERNAL MEDICINE

## 2019-02-09 PROCEDURE — 700101 HCHG RX REV CODE 250: Performed by: INTERNAL MEDICINE

## 2019-02-09 PROCEDURE — A9270 NON-COVERED ITEM OR SERVICE: HCPCS | Performed by: SURGERY

## 2019-02-09 PROCEDURE — 36415 COLL VENOUS BLD VENIPUNCTURE: CPT

## 2019-02-09 PROCEDURE — 80053 COMPREHEN METABOLIC PANEL: CPT

## 2019-02-09 PROCEDURE — 700102 HCHG RX REV CODE 250 W/ 637 OVERRIDE(OP): Performed by: HOSPITALIST

## 2019-02-09 PROCEDURE — 83735 ASSAY OF MAGNESIUM: CPT

## 2019-02-09 PROCEDURE — 99239 HOSP IP/OBS DSCHRG MGMT >30: CPT | Performed by: HOSPITALIST

## 2019-02-09 PROCEDURE — A9270 NON-COVERED ITEM OR SERVICE: HCPCS | Performed by: HOSPITALIST

## 2019-02-09 RX ORDER — LISINOPRIL 40 MG/1
40 TABLET ORAL DAILY
Qty: 30 TAB | Refills: 0 | Status: SHIPPED | OUTPATIENT
Start: 2019-02-09 | End: 2019-06-04

## 2019-02-09 RX ORDER — CIPROFLOXACIN 500 MG/1
500 TABLET, FILM COATED ORAL 2 TIMES DAILY
Qty: 10 TAB | Refills: 0 | Status: SHIPPED | OUTPATIENT
Start: 2019-02-10 | End: 2019-02-15

## 2019-02-09 RX ORDER — METRONIDAZOLE 500 MG/1
500 TABLET ORAL EVERY 8 HOURS
Qty: 18 TAB | Refills: 0 | Status: SHIPPED | OUTPATIENT
Start: 2019-02-09 | End: 2019-02-09

## 2019-02-09 RX ORDER — AMLODIPINE BESYLATE 5 MG/1
5 TABLET ORAL DAILY
Qty: 30 TAB | Refills: 0 | Status: SHIPPED | OUTPATIENT
Start: 2019-02-10 | End: 2019-06-04

## 2019-02-09 RX ORDER — CIPROFLOXACIN 500 MG/1
500 TABLET, FILM COATED ORAL 2 TIMES DAILY
Qty: 10 TAB | Refills: 0 | Status: SHIPPED | OUTPATIENT
Start: 2019-02-10 | End: 2019-02-09

## 2019-02-09 RX ORDER — AMLODIPINE BESYLATE 5 MG/1
5 TABLET ORAL DAILY
Qty: 30 TAB | Refills: 0 | Status: SHIPPED | OUTPATIENT
Start: 2019-02-10 | End: 2019-02-09

## 2019-02-09 RX ORDER — POTASSIUM CHLORIDE 20 MEQ/1
40 TABLET, EXTENDED RELEASE ORAL ONCE
Status: DISCONTINUED | OUTPATIENT
Start: 2019-02-09 | End: 2019-02-09 | Stop reason: HOSPADM

## 2019-02-09 RX ORDER — METRONIDAZOLE 500 MG/1
500 TABLET ORAL EVERY 8 HOURS
Qty: 18 TAB | Refills: 0 | Status: SHIPPED | OUTPATIENT
Start: 2019-02-09 | End: 2019-02-15

## 2019-02-09 RX ORDER — LISINOPRIL 40 MG/1
40 TABLET ORAL DAILY
Qty: 30 TAB | Refills: 0 | Status: SHIPPED | OUTPATIENT
Start: 2019-02-09 | End: 2019-02-09

## 2019-02-09 RX ADMIN — CELECOXIB 100 MG: 100 CAPSULE ORAL at 05:48

## 2019-02-09 RX ADMIN — OXYCODONE AND ACETAMINOPHEN 2 TABLET: 5; 325 TABLET ORAL at 04:09

## 2019-02-09 RX ADMIN — METRONIDAZOLE 500 MG: 500 INJECTION, SOLUTION INTRAVENOUS at 06:25

## 2019-02-09 RX ADMIN — LISINOPRIL 40 MG: 20 TABLET ORAL at 05:48

## 2019-02-09 RX ADMIN — LEVOTHYROXINE SODIUM 100 MCG: 50 TABLET ORAL at 05:48

## 2019-02-09 RX ADMIN — OMEPRAZOLE 20 MG: 20 CAPSULE, DELAYED RELEASE ORAL at 05:48

## 2019-02-09 RX ADMIN — AMLODIPINE BESYLATE 5 MG: 10 TABLET ORAL at 05:48

## 2019-02-09 RX ADMIN — CEFTRIAXONE SODIUM 2 G: 2 INJECTION, POWDER, FOR SOLUTION INTRAMUSCULAR; INTRAVENOUS at 05:48

## 2019-02-09 ASSESSMENT — ENCOUNTER SYMPTOMS
ABDOMINAL PAIN: 0
FEVER: 0
PSYCHIATRIC NEGATIVE: 1
NAUSEA: 0
VOMITING: 0
RESPIRATORY NEGATIVE: 1
MUSCULOSKELETAL NEGATIVE: 1
CHILLS: 0
CARDIOVASCULAR NEGATIVE: 1
NEUROLOGICAL NEGATIVE: 1
EYES NEGATIVE: 1
ROS GI COMMENTS: +BM

## 2019-02-09 NOTE — DISCHARGE SUMMARY
Discharge Summary    CHIEF COMPLAINT ON ADMISSION  No chief complaint on file.      Reason for Admission  Hyperbilirubinemia     Admission Date  2/2/2019    CODE STATUS  Full Code    HPI & HOSPITAL COURSE  This is a 73 y.o. female with PMHx of biliary obstruction here with abdominal pain.  On 1/28 ERCP was done with placement of covered metal stent.  However she continued to have transaminitis abdominal pain and was therefore transferred to [facility] for surgical exploration and intervention. On 2/4 she underwent laparotomy with drainage of peritoneal abscess in subhepatic space, open cholecystectomy for gangrenous cholecystitis and hepatotomy for hepatic abscess drainage. Her symptoms have improved markedly but continued to have some abdominal discomfort which was attributed to her biliary stent. Her biliary stent was changed out with a shorter length stent which improved her discomfort. She has been discharged on PO antibiotics and Prevena wound vac.      I saw patient on day of discharge. Therefore, she is discharged in good and stable condition to home with close outpatient follow-up.    The patient met 2-midnight criteria for an inpatient stay at the time of discharge.    Discharge Date  2/9/19    FOLLOW UP ITEMS POST DISCHARGE  Repeat CT abdomen and pelvis in 2 weeks per ID    DISCHARGE DIAGNOSES  Principal Problem (Resolved):    Gangrenous cholecystitis POA: Yes  Active Problems:    Hypertension POA: Yes    Hypothyroid POA: Yes    Hyponatremia POA: Yes    Dyslipidemia POA: Unknown    Anemia POA: Unknown  Resolved Problems:    Transaminitis and hyperbilirubinemia POA: Yes    Biliary stricture POA: Unknown    Liver abscess POA: Unknown    Constipation POA: Unknown      FOLLOW UP  No future appointments.  No follow-up provider specified.    MEDICATIONS ON DISCHARGE     Medication List      START taking these medications      Instructions   amLODIPine 5 MG Tabs  Start taking on:  2/10/2019  Commonly known as:   NORVASC   Take 1 Tab by mouth every day.  Dose:  5 mg     ciprofloxacin 500 MG Tabs  Start taking on:  2/10/2019  Commonly known as:  CIPRO   Take 1 Tab by mouth 2 times a day for 5 days.  Dose:  500 mg     metroNIDAZOLE 500 MG Tabs  Commonly known as:  FLAGYL   Take 1 Tab by mouth every 8 hours for 6 days.  Dose:  500 mg        CHANGE how you take these medications      Instructions   lisinopril 40 MG tablet  What changed:  · medication strength  · how much to take  Commonly known as:  PRINIVIL, ZESTRIL   Take 1 Tab by mouth every day.  Dose:  40 mg        CONTINUE taking these medications      Instructions   aspirin EC 81 MG Tbec  Commonly known as:  ECOTRIN   Doctor's comments:  Resume taking 12/16  Take 1 Tab by mouth every day.  Dose:  81 mg     atorvastatin 40 MG Tabs  Commonly known as:  LIPITOR   Take 40 mg by mouth every evening.  Dose:  40 mg     levothyroxine 100 MCG Tabs  Commonly known as:  SYNTHROID   Take 100 mcg by mouth every day.  Dose:  100 mcg     vitamin B-12 1000 MCG Tabs   Take 2,000 mcg by mouth every day.  Dose:  2000 mcg     Vitamin D3 2000 UNIT Caps   Take 2,000 Units by mouth every day.  Dose:  2000 Units        STOP taking these medications    amoxicillin-clavulanate 875-125 MG Tabs  Commonly known as:  AUGMENTIN     tramadol 50 MG Tabs  Commonly known as:  ULTRAM            Allergies  No Known Allergies    DIET  Orders Placed This Encounter   Procedures   • Diet Order Cardiac     Standing Status:   Standing     Number of Occurrences:   1     Order Specific Question:   Diet:     Answer:   Cardiac [6]     Order Specific Question:   Macronutrient modifications:     Answer:   Low Fat [5]       ACTIVITY  As tolerated.  Weight bearing as tolerated    CONSULTATIONS  Surgery  ID  Gastroenterology     PROCEDURES   laparotomy with drainage of peritoneal abscess in subhepatic space, open cholecystectomy for gangrenous cholecystitis and hepatic abscess drainage.  Biliary stent  replacement    LABORATORY  Lab Results   Component Value Date    SODIUM 133 (L) 02/09/2019    POTASSIUM 3.3 (L) 02/09/2019    CHLORIDE 101 02/09/2019    CO2 24 02/09/2019    GLUCOSE 94 02/09/2019    BUN 5 (L) 02/09/2019    CREATININE 0.53 02/09/2019        Lab Results   Component Value Date    WBC 8.1 02/09/2019    HEMOGLOBIN 8.8 (L) 02/09/2019    HEMATOCRIT 26.8 (L) 02/09/2019    PLATELETCT 320 02/09/2019        Total time of the discharge process exceeds 38 minutes.

## 2019-02-09 NOTE — PROGRESS NOTES
AOx4  Denies pain   On room air. Saturating well. Denies SOB  Eating breakfast. No nausea.     Only 50 cc out in BRAX[place] since procedure yesterday. Scant green biliary drainage.     prevena in place.     No trouble urinating.     POC discussed.     Pt ready to go home!

## 2019-02-09 NOTE — DISCHARGE PLANNING
Received Choice form at 1600  Agency/Facility Name: [facility]  Referral sent per Choice form @ 1601 via manual fax.

## 2019-02-09 NOTE — PROGRESS NOTES
Looks good   Ok home on po abx   Little need for HH , patient can empty drain   Clips out next week  In Dr g office

## 2019-02-09 NOTE — DISCHARGE INSTRUCTIONS
Discharge Instructions    Discharged to home by car with relative. Discharged via wheelchair, hospital escort: Yes.  Special equipment needed: Not Applicable    Be sure to schedule a follow-up appointment with your primary care doctor or any specialists as instructed.     Discharge Plan:   Diet Plan: Discussed  Activity Level: Discussed  Confirmed Follow up Appointment: Patient to Call and Schedule Appointment  Confirmed Symptoms Management: Discussed  Medication Reconciliation Updated: Yes  Pneumococcal Vaccine Administered/Refused: Not given - Patient refused pneumococcal vaccine  Influenza Vaccine Indication: Patient Refuses    I understand that a diet low in cholesterol, fat, and sodium is recommended for good health. Unless I have been given specific instructions below for another diet, I accept this instruction as my diet prescription.   Other diet: regular    Special Instructions: None    · Is patient discharged on Warfarin / Coumadin?   No     Depression / Suicide Risk    As you are discharged from this [facility] facility, it is important to learn how to keep safe from harming yourself.    Recognize the warning signs:  · Abrupt changes in personality, positive or negative- including increase in energy   · Giving away possessions  · Change in eating patterns- significant weight changes-  positive or negative  · Change in sleeping patterns- unable to sleep or sleeping all the time   · Unwillingness or inability to communicate  · Depression  · Unusual sadness, discouragement and loneliness  · Talk of wanting to die  · Neglect of personal appearance   · Rebelliousness- reckless behavior  · Withdrawal from people/activities they love  · Confusion- inability to concentrate     If you or a loved one observes any of these behaviors or has concerns about self-harm, here's what you can do:  · Talk about it- your feelings and reasons for harming yourself  · Remove any means that you might use to hurt  yourself (examples: pills, rope, extension cords, firearm)  · Get professional help from the community (Mental Health, Substance Abuse, psychological counseling)  · Do not be alone:Call your Safe Contact- someone whom you trust who will be there for you.  · Call your local CRISIS HOTLINE 346-5619 or 865-259-5883  · Call your local Children's Mobile Crisis Response Team [place] (766) 431-1387 or www.Emergency CallWorks  · Call the toll free National Suicide Prevention Hotlines   · National Suicide Prevention Lifeline 470-127-MAKS (3537)  · [employer] Line Network 800-SUICIDE (491-4100)          You need a new CT in 2-3 weeks from the surgery date (approximately around Feb 21 with your Primary care provider)    Follow up with  for drain removal      Will plan on at least 7 more days of antibiotics, estimated end date 2/15/19      Remove prevena on 2/11

## 2019-02-09 NOTE — PROGRESS NOTES
PIV removed- pt tolerated well  BRAXTON drain maintenance discussed. Pt and pt's daughter demonstrated proper way to empty, measure and compress    Prevena instructions discussed    All prescriptions in hand    F/u appointments all discussed and all questions answered

## 2019-02-09 NOTE — PROGRESS NOTES
Bedside report completed.  A&O x4.  In no acute distress.   VSS. on RA.  Ambulating with standby assistance  Tolerating cardiac diet, denies N/V.  declines pain    Call light and personal belongings within reach. Family at bedside. POC discussed and all questions answered.  Hourly rounding in place.  No additional needs at this time.

## 2019-02-09 NOTE — PROGRESS NOTES
Surgical Progress Note    Author: Janice Will Date & Time created: 2019   9:44 AM     Interval Events:  POD #5 s/p drainage of liver abscess, peritoneal abscess, and open cholecystectomy and subsequent ERCP. Tolerating po without nausea/vomiting. Denies pain.  Pt is ambulating and voiding.   Review of Systems   Constitutional: Negative for chills and fever.   HENT: Negative.    Eyes: Negative.    Respiratory: Negative.    Cardiovascular: Negative.    Gastrointestinal: Negative for abdominal pain, nausea and vomiting.        +BM   Genitourinary: Negative.    Musculoskeletal: Negative.    Skin: Negative.    Neurological: Negative.    Endo/Heme/Allergies: Negative.    Psychiatric/Behavioral: Negative.      Hemodynamics:  Temp (24hrs), Av.8 °C (98.3 °F), Min:36.4 °C (97.5 °F), Max:37.3 °C (99.1 °F)  Temperature: 36.6 °C (97.8 °F)  Pulse  Av.1  Min: 56  Max: 86Heart Rate (Monitored): 62  Blood Pressure : 139/53, NIBP: 154/71     Respiratory:    Respiration: 16, Pulse Oximetry: 94 %        RUL Breath Sounds: Clear, RML Breath Sounds: Clear, RLL Breath Sounds: Clear, VLADISLAV Breath Sounds: Clear, LLL Breath Sounds: Clear  Neuro:  GCS       Fluids:    Intake/Output Summary (Last 24 hours) at 19 0944  Last data filed at 19 0500   Gross per 24 hour   Intake              860 ml   Output               50 ml   Net              810 ml     Weight: 54.5 kg (120 lb 2.4 oz)  Current Diet Order   Procedures   • Diet Order Cardiac     Physical Exam   Constitutional: She is oriented to person, place, and time. She appears well-developed and well-nourished.   HENT:   Head: Normocephalic.   Eyes: Conjunctivae are normal.   Neck: Normal range of motion.   Cardiovascular: Normal rate.    Pulmonary/Chest: Effort normal.   Abdominal: Soft. She exhibits distension. There is tenderness.   Prevena in place with good suction, IR drain in place with bilious drainage.   Musculoskeletal: Normal range of motion.    Neurological: She is alert and oriented to person, place, and time.   Skin: Skin is warm and dry.   Psychiatric: She has a normal mood and affect. Her behavior is normal. Judgment and thought content normal.     Labs:  Recent Results (from the past 24 hour(s))   CBC WITH DIFFERENTIAL    Collection Time: 02/09/19  6:35 AM   Result Value Ref Range    WBC 8.1 4.8 - 10.8 K/uL    RBC 2.88 (L) 4.20 - 5.40 M/uL    Hemoglobin 8.8 (L) 12.0 - 16.0 g/dL    Hematocrit 26.8 (L) 37.0 - 47.0 %    MCV 93.1 81.4 - 97.8 fL    MCH 30.6 27.0 - 33.0 pg    MCHC 32.8 (L) 33.6 - 35.0 g/dL    RDW 46.3 35.9 - 50.0 fL    Platelet Count 320 164 - 446 K/uL    MPV 8.3 (L) 9.0 - 12.9 fL    Neutrophils-Polys 71.40 44.00 - 72.00 %    Lymphocytes 14.00 (L) 22.00 - 41.00 %    Monocytes 10.40 0.00 - 13.40 %    Eosinophils 0.90 0.00 - 6.90 %    Basophils 0.50 0.00 - 1.80 %    Immature Granulocytes 2.80 (H) 0.00 - 0.90 %    Nucleated RBC 0.00 /100 WBC    Neutrophils (Absolute) 5.81 2.00 - 7.15 K/uL    Lymphs (Absolute) 1.14 1.00 - 4.80 K/uL    Monos (Absolute) 0.85 0.00 - 0.85 K/uL    Eos (Absolute) 0.07 0.00 - 0.51 K/uL    Baso (Absolute) 0.04 0.00 - 0.12 K/uL    Immature Granulocytes (abs) 0.23 (H) 0.00 - 0.11 K/uL    NRBC (Absolute) 0.00 K/uL   Comp Metabolic Panel    Collection Time: 02/09/19  6:35 AM   Result Value Ref Range    Sodium 133 (L) 135 - 145 mmol/L    Potassium 3.3 (L) 3.6 - 5.5 mmol/L    Chloride 101 96 - 112 mmol/L    Co2 24 20 - 33 mmol/L    Anion Gap 8.0 0.0 - 11.9    Glucose 94 65 - 99 mg/dL    Bun 5 (L) 8 - 22 mg/dL    Creatinine 0.53 0.50 - 1.40 mg/dL    Calcium 7.9 (L) 8.5 - 10.5 mg/dL    AST(SGOT) 25 12 - 45 U/L    ALT(SGPT) 34 2 - 50 U/L    Alkaline Phosphatase 133 (H) 30 - 99 U/L    Total Bilirubin 0.6 0.1 - 1.5 mg/dL    Albumin 2.7 (L) 3.2 - 4.9 g/dL    Total Protein 4.6 (L) 6.0 - 8.2 g/dL    Globulin 1.9 1.9 - 3.5 g/dL    A-G Ratio 1.4 g/dL   ESTIMATED GFR    Collection Time: 02/09/19  6:35 AM   Result Value Ref Range     GFR If African American >60 >60 mL/min/1.73 m 2    GFR If Non African American >60 >60 mL/min/1.73 m 2   MAGNESIUM    Collection Time: 02/09/19  6:35 AM   Result Value Ref Range    Magnesium 2.1 1.5 - 2.5 mg/dL     Medical Decision Making, by Problem:  Active Hospital Problems    Diagnosis   • Biliary stricture [K83.1]     Priority: High   • Liver abscess [K75.0]     Priority: High   • Transaminitis and hyperbilirubinemia [R74.0]     Priority: High   • Gangrenous cholecystitis [K81.0]     Priority: High   • Constipation [K59.00]   • Anemia [D64.9]   • Dyslipidemia [E78.5]   • Hyponatremia [E87.1]   • Hypothyroid [E03.9]   • Hypertension [I10]     Plan:  Pt is alert and oriented, NAD. Breathing unlabored. Tolerating PO.  Abdomen is soft, mildly distended with Prevena dressing in place. Incisions C/D/I. VS stable. Labs reviewed, Hgb decreased. Encouraged ambulation and incentive spirometry. Stable for discharge from surgical standpoint with home health for wound management and oral antibiotics.  Advised drain care.  F/U with Dr. Aj in 1-2 weeks.  Discussed with Dr. Gutiérrez.    Quality Measures:  Quality-Core Measures   Reviewed items::  Labs reviewed  Wagoner catheter::  No Wagoner  DVT prophylaxis pharmacological::  Not indicated at this time, ambulatory      Discussed patient condition with Family, RN, Patient and general surgery-Dr. Gutiérrez.

## 2019-02-09 NOTE — CARE PLAN
Problem: Safety  Goal: Will remain free from falls  Patient is not deemed a fall risk. Patient educated to call for assistance when necessary. Call light left within reach of patient.      Problem: Infection  Goal: Will remain free from infection    Intervention: Implement standard precautions and perform hand washing before and after patient contact  Hand hygiene performed prior to and after entering pt room. Gloves worn during patient interactions.

## 2019-02-09 NOTE — DISCHARGE PLANNING
RN CM met with pt at bedside,pt is agreeable with [facility] in [place].  Faxed choice to [facility].    RN CM discussed with pt and her son Osvaldo ([phone]) that Home Health will not be able to accept pt without a PCP available, and that RN CM is waiting for a call back from pt's PCP office regarding a covering physician for her absent PCP.

## 2019-02-11 ENCOUNTER — HOSPITAL ENCOUNTER (EMERGENCY)
Facility: MEDICAL CENTER | Age: 74
End: 2019-02-11
Attending: EMERGENCY MEDICINE
Payer: MEDICARE

## 2019-02-11 VITALS
BODY MASS INDEX: 21.64 KG/M2 | SYSTOLIC BLOOD PRESSURE: 144 MMHG | DIASTOLIC BLOOD PRESSURE: 57 MMHG | HEART RATE: 61 BPM | WEIGHT: 122.14 LBS | TEMPERATURE: 98.1 F | OXYGEN SATURATION: 97 % | RESPIRATION RATE: 16 BRPM | HEIGHT: 63 IN

## 2019-02-11 DIAGNOSIS — Z48.00 ENCOUNTER FOR CHANGE OR REMOVAL OF NONSURGICAL WOUND DRESSING: ICD-10-CM

## 2019-02-11 PROCEDURE — 302043 TAPE, HYPAFIX: Performed by: EMERGENCY MEDICINE

## 2019-02-11 PROCEDURE — 99284 EMERGENCY DEPT VISIT MOD MDM: CPT

## 2019-02-11 ASSESSMENT — LIFESTYLE VARIABLES: DO YOU DRINK ALCOHOL: NO

## 2019-02-11 NOTE — ED TRIAGE NOTES
"Chief Complaint   Patient presents with   • Other     Pt has a wound vac in the upper abdomen that family needs assistance with removing. Approved to be removed by surgeon today.     Pulse 63   Temp 36.5 °C (97.7 °F) (Temporal)   Resp 16   Ht 1.6 m (5' 3\")   Wt 55.4 kg (122 lb 2.2 oz)   LMP  (LMP Unknown)   SpO2 96%   Breastfeeding? No   BMI 21.64 kg/m²     Pt ambulated into triage, complaints as above. VS as above, NAD, encouraged to return to the triage nurse or tech with any new complaints or symptoms.  "

## 2019-02-11 NOTE — ED NOTES
Wound vac removed and redressed.  Discharge instructions given.  All questions answered.  Pt to follow-up with MD as discussed.  Pt verbalized understanding.  All belongings with pt.  Pt ambulated to lobby.

## 2019-02-11 NOTE — ED NOTES
Pt ambulated to yellow 67.  Agree with triage note.  Pt changed into gown and warm blanket provided.  ERP to see.

## 2019-02-11 NOTE — ED PROVIDER NOTES
ED Provider Note    Scribed for Adams Joe M.D. by Stefania Ceja. 2/11/2019  10:30 AM    Primary Care Provider: Damien Phillips M.D.  Means of arrival: walk-in  History limited by: none    CHIEF COMPLAINT  Chief Complaint   Patient presents with   • Other     Pt has a wound vac in the upper abdomen that family needs assistance with removing. Approved to be removed by surgeon today.       HPI  Clementine Hopper is a 73 y.o. female who presents to the ED seeking removal of a wound vac located in the upper abdomen. Patient underwent ERCP with stent placement by Dr. Moore earlier this year for biliary stricture and laparotomy 2/4/19 with drainage of peritoneal abscess, cholecystectomy, biliary stent replacement and hepatotomy for hepatic abscess drainage performed by Dr. Cannon. She was discharged on 2/9/19 with antibiotics and Prevena Wound Vac. Per family, patient's wound vac was approved to be removed today per her discharge paperwork. Patient has not seen her surgeon since discharge for a follow up. At this time, patient reports feeling markedly improved from just prior to undergoing these procedures with no reports of abdominal pain, nausea, vomiting, diarrhea. Patient also denies any fever, chills, weight changes, vision changes, sore throat, chest pain, cough, difficulty breathing, weakness, rash, headache.    REVIEW OF SYSTEMS    CONSTITUTIONAL:  Denies fever, chills, weight gain/loss  EYES:  Denies vision changes  ENT:  Denies sore throat  CARDIOVASCULAR:  Denies chest pain  RESPIRATORY:  Denies cough, difficulty breathing.  GI:  Denies abdominal pain, nausea, vomiting, or diarrhea.  MUSCULOSKELETAL:  Denies weakness  SKIN:  No rash.  Positive dressing over her anterior abdomen.    All other systems reviewed and are negative.    PAST MEDICAL HISTORY  Past Medical History:   Diagnosis Date   • Arrhythmia     states RN family members say she has a blockage in her heart- has BBB per EKG   •  Bowel habit changes 01/2019    constipation   • Cataract     small not operable yet   • High cholesterol    • Hypertension     with current health issues   • Hypothyroid    • Jaundice     had bile blockage from infant until 9yo   • Stroke (HCC) 07/2018    suspected small stroke per neurologist, has plaque in carotid- no residual- states on statin for this,1/2019 not taking statin r/t muscle spasms       FAMILY HISTORY  Family History   Problem Relation Age of Onset   • Cancer Mother         Head and neck cancer       SOCIAL HISTORY   reports that she quit smoking about 19 years ago. She has never used smokeless tobacco. She reports that she drinks alcohol. She reports that she does not use drugs.    SURGICAL HISTORY  Past Surgical History:   Procedure Laterality Date   • ERCP IN OR N/A 2/8/2019    Procedure: ERCP IN OR;  Surgeon: Haroon Moore M.D.;  Location: SURGERY SAME DAY [facility];  Service: Gastroenterology   • CHOLECYSTECTOMY  2/4/2019    Procedure: CHOLECYSTECTOMY, EXPLORATORY LAPAROTOMY, INTRA-OPERATIVE ULTRASOUND, DRAINAGE OF PERITONEAL ABSCESS;  Surgeon: Pablo Cannon M.D.;  Location: [facility];  Service: General   • ERCP  1/28/2019    Procedure: ERCP- SPYGLASS, POSSIBLE DILATION;  Surgeon: Haroon Moore M.D.;  Location: [facility];  Service: Gastroenterology   • ERCP-DIAGNOSTIC W/BIOPSY  1/28/2019    Procedure: ERCP-DIAGNOSTIC W/BIOPSY;  Surgeon: Haroon Moore M.D.;  Location: [facility];  Service: Gastroenterology   • ERCP W/ INSERTION STENT/TUBE  1/28/2019    Procedure: ERCP W/ INSERTION STENT/TUBE- POSSIBLE BILIARY STENT/METAL STENT (FULLY COVERED);  Surgeon: Haroon Moore M.D.;  Location: [facility];  Service: Gastroenterology   • GASTROSCOPY N/A 1/7/2019    Procedure: GASTROSCOPY;  Surgeon: Cristofer Brandon M.D.;  Location: [facility];  Service: Gastroenterology   • EGD WITH ASP/BX N/A 1/7/2019    Procedure:  "EGD WITH ASP/BX;  Surgeon: Cristofer Brandon M.D.;  Location: SURGERY [facility];  Service: Gastroenterology   • ERCP  12/13/2018    Procedure: ERCP;  Surgeon: Cristofer Brandon M.D.;  Location: SURGERY [facility];  Service: Gastroenterology   • LAPAROTOMY  1953    for clogged bile duct   • APPENDECTOMY  1952    ruptured       CURRENT MEDICATIONS  No current facility-administered medications for this encounter.     Current Outpatient Prescriptions:   •  lisinopril (PRINIVIL, ZESTRIL) 40 MG tablet, Take 1 Tab by mouth every day., Disp: 30 Tab, Rfl: 0  •  amLODIPine (NORVASC) 5 MG Tab, Take 1 Tab by mouth every day., Disp: 30 Tab, Rfl: 0  •  ciprofloxacin (CIPRO) 500 MG Tab, Take 1 Tab by mouth 2 times a day for 5 days., Disp: 10 Tab, Rfl: 0  •  metroNIDAZOLE (FLAGYL) 500 MG Tab, Take 1 Tab by mouth every 8 hours for 6 days., Disp: 18 Tab, Rfl: 0  •  aspirin EC (ECOTRIN) 81 MG Tablet Delayed Response, Take 1 Tab by mouth every day., Disp: 30 Tab, Rfl:   •  atorvastatin (LIPITOR) 40 MG Tab, Take 40 mg by mouth every evening., Disp: , Rfl:   •  Cholecalciferol (VITAMIN D3) 2000 UNIT Cap, Take 2,000 Units by mouth every day., Disp: , Rfl:   •  Cyanocobalamin (VITAMIN B-12) 1000 MCG Tab, Take 2,000 mcg by mouth every day., Disp: , Rfl:   •  levothyroxine (SYNTHROID) 100 MCG Tab, Take 100 mcg by mouth every day., Disp: , Rfl:     ALLERGIES  No Known Allergies    PHYSICAL EXAM  VITAL SIGNS: /61   Pulse 65   Temp 36.5 °C (97.7 °F) (Temporal)   Resp 16   Ht 1.6 m (5' 3\")   Wt 55.4 kg (122 lb 2.2 oz)   LMP  (LMP Unknown)   SpO2 96%   Breastfeeding? No   BMI 21.64 kg/m²      Constitutional: Patient is awake and alert. No acute respiratory distress. Well developed, Well nourished, Non-toxic appearance.  HENT: Normocephalic, Atraumatic  Cardiovascular: Heart is regular rate and rhythm no murmur, rub or thrill.   Thorax & Lungs: Chest is symmetrical, with good breath sounds. No wheezing or crackles. No respiratory " distress  Abdomen: Soft,.  Very mild tenderness in the right upper quadrant.  But she states this is much better.  Wound vac to upper abdomen with clean dry and intact 4x4s. Stent also in place. No masses, No pulsatile masses.  Skin: Warm, Dry, no petechia, purpura, or rash.   Extremities: No edema. Non tender.   Musculoskeletal: Good range of motion to upper and lower extremities.    Neurologic: Alert & oriented  Strength is symmetrical in the upper lower extremities.    Psychiatric: Normal affect    COURSE & MEDICAL DECISION MAKING  Pertinent Labs & Imaging studies reviewed. (See chart for details)    10:30 AM - Patient seen and examined at bedside. I informed the patient that I would contact her surgeon Dr. Cannon to see what they would like to do about the wound vac and post procedure follow up. She understands and agrees with treatment plan. Paged surgery, Dr. Cannon.    10:55 AM Spoke with Dr. Cannon through a surgical nurse since he was in Surgery, about the patient's condition. He would like us to remove the dressing and drain today and will see them in clinic at their established post procedure follow up. I informed the patient and family of this. They are agreeable. Wound Vac and dressing removed without difficulty, staples still present. Surgical site clean dry and intact. Patient will be discharged.  After this was removed I looked back at the wound is got multiple staples but no signs of infection looks quite well.    Decision Making  Patient here had a very complicated abdominal surgery now comes in to have her wound VAC removed.  It was removed after discussing the case with her surgeon.  The wound looks good.  She will use local wound care and dressings.  And follow-up with her surgeons office and they are to have an appointment for this.    The patient will return for new or worsening symptoms and is stable at the time of discharge.    DISPOSITION:  Patient will be discharged home  in stable condition.    FOLLOW UP:  Damien Phillips M.D.  [address]  [facility] [address]  [phone]    In 3 days      Pablo Cannon M.D.  [address] [facility] [address]  [facility] [id number] [phone]    In 1 week        FINAL IMPRESSION  1. Encounter for change or removal of nonsurgical wound dressing        PLAN  1.  Follow-up with her surgeon as already ordered  2.  Follow up with her primary care doctor within a week  3.  Wound care information sheet  4. Return to the emergency department for increased pains, fevers, vomiting or change in condition.     Stefania COHEN (Scribe), am scribing for, and in the presence of, Adams Joe M.D..    Electronically signed by: Stefania Ceja (Scribe), 2/11/2019    Adams COHEN M.D. personally performed the services described in this documentation, as scribed by Stefania Ceja in my presence, and it is both accurate and complete.    The note accurately reflects work and decisions made by me.  Adams Joe  2/11/2019  3:23 PM

## 2019-02-12 ENCOUNTER — HOSPITAL ENCOUNTER (OUTPATIENT)
Dept: RADIOLOGY | Facility: MEDICAL CENTER | Age: 74
End: 2019-02-12

## 2019-02-26 ENCOUNTER — OFFICE VISIT (OUTPATIENT)
Dept: HEMATOLOGY ONCOLOGY | Facility: MEDICAL CENTER | Age: 74
End: 2019-02-26
Payer: MEDICARE

## 2019-02-26 VITALS
DIASTOLIC BLOOD PRESSURE: 62 MMHG | TEMPERATURE: 99 F | BODY MASS INDEX: 20.08 KG/M2 | SYSTOLIC BLOOD PRESSURE: 110 MMHG | HEIGHT: 63 IN | WEIGHT: 113.32 LBS | HEART RATE: 87 BPM | OXYGEN SATURATION: 99 %

## 2019-02-26 DIAGNOSIS — K83.09 CHOLANGITIS: ICD-10-CM

## 2019-02-26 DIAGNOSIS — K75.0 LIVER ABSCESS: ICD-10-CM

## 2019-02-26 NOTE — PROGRESS NOTES
Subjective:   2/26/2019  7:08 AM  Primary care physician:Damien Phillips M.D.      Chief Complaint: No chief complaint on file.    Diagnosis:   1. Cholangitis     2. Liver abscess         History of presenting illness:  Clementine Hopper  is a pleasant 73 y.o. year old female who presented with first and last postoperative visit.  She denies any fever or chills, nausea or vomiting.  Her BRAXTON drain has been in place for under a month unfortunately.  Is putting out serous fluid.  We will removed here in the office.  She is not jaundiced.      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   • Stroke (HCC) 07/2018    suspected small stroke per neurologist, has plaque in carotid- no residual- states on statin for this,1/2019 not taking statin r/t muscle spasms     Past Surgical History:   Procedure Laterality Date   • ERCP IN OR N/A 2/8/2019    Procedure: ERCP IN OR;  Surgeon: Haroon Moore M.D.;  Location: SURGERY SAME DAY [facility];  Service: Gastroenterology   • CHOLECYSTECTOMY  2/4/2019    Procedure: CHOLECYSTECTOMY, EXPLORATORY LAPAROTOMY, INTRA-OPERATIVE ULTRASOUND, DRAINAGE OF PERITONEAL ABSCESS;  Surgeon: Pablo Cannon M.D.;  Location: [facility];  Service: General   • ERCP  1/28/2019    Procedure: ERCP- SPYGLASS, POSSIBLE DILATION;  Surgeon: Haroon Moore M.D.;  Location: [facility];  Service: Gastroenterology   • ERCP-DIAGNOSTIC W/BIOPSY  1/28/2019    Procedure: ERCP-DIAGNOSTIC W/BIOPSY;  Surgeon: Haroon Moore M.D.;  Location: [facility];  Service: Gastroenterology   • ERCP W/ INSERTION STENT/TUBE  1/28/2019    Procedure: ERCP W/  INSERTION STENT/TUBE- POSSIBLE BILIARY STENT/METAL STENT (FULLY COVERED);  Surgeon: Haroon Moore M.D.;  Location: [facility];  Service: Gastroenterology   • GASTROSCOPY N/A 1/7/2019    Procedure: GASTROSCOPY;  Surgeon: Cristofer Brandon M.D.;  Location: [facility];  Service: Gastroenterology   • EGD WITH ASP/BX N/A 1/7/2019    Procedure: EGD WITH ASP/BX;  Surgeon: Cristofer Brandon M.D.;  Location: [facility];  Service: Gastroenterology   • ERCP  12/13/2018    Procedure: ERCP;  Surgeon: Cristofer Brandon M.D.;  Location: [facility];  Service: Gastroenterology   • LAPAROTOMY  1953    for clogged bile duct   • APPENDECTOMY  1952    ruptured     No Known Allergies  Outpatient Encounter Prescriptions as of 2/26/2019   Medication Sig Dispense Refill   • lisinopril (PRINIVIL, ZESTRIL) 40 MG tablet Take 1 Tab by mouth every day. 30 Tab 0   • amLODIPine (NORVASC) 5 MG Tab Take 1 Tab by mouth every day. 30 Tab 0   • aspirin EC (ECOTRIN) 81 MG Tablet Delayed Response Take 1 Tab by mouth every day. 30 Tab    • atorvastatin (LIPITOR) 40 MG Tab Take 40 mg by mouth every evening.     • Cholecalciferol (VITAMIN D3) 2000 UNIT Cap Take 2,000 Units by mouth every day.     • Cyanocobalamin (VITAMIN B-12) 1000 MCG Tab Take 2,000 mcg by mouth every day.     • levothyroxine (SYNTHROID) 100 MCG Tab Take 100 mcg by mouth every day.       No facility-administered encounter medications on file as of 2/26/2019.      Social History     Social History   • Marital status: Single     Spouse name: N/A   • Number of children: N/A   • Years of education: N/A     Occupational History   • Not on file.     Social History Main Topics   • Smoking status: Former Smoker     Quit date: 2000   • Smokeless tobacco: Never Used   • Alcohol use Yes      Comment: 1-2 glasses of wine a night- 1/2/19 none in a month   • Drug use: No   • Sexual activity: Not on file     Other Topics Concern   • Not on file     Social  History Narrative   • No narrative on file      History   Smoking Status   • Former Smoker   • Quit date: 2000   Smokeless Tobacco   • Never Used     History   Alcohol Use   • Yes     Comment: 1-2 glasses of wine a night- 1/2/19 none in a month     History   Drug Use No        Family History   Problem Relation Age of Onset   • Cancer Mother         Head and neck cancer       Review of Systems   HENT: Positive for hearing loss.    All other systems reviewed and are negative.       Objective:   LMP  (LMP Unknown)     Physical Exam   Abdominal: Soft. Bowel sounds are normal.   BRAXTON drain putting out serous fluid.  It is discharged here in the office.  It was removed without any complication.  She has no abdominal pain.  Her incision is clean, dry, and intact.       Labs:  Results for ANIYAH PHIPPS (MRN [id number]) as of 2/26/2019 07:09   Ref. Range 2/9/2019 06:35   WBC Latest Ref Range: 4.8 - 10.8 K/uL 8.1   RBC Latest Ref Range: 4.20 - 5.40 M/uL 2.88 (L)   Hemoglobin Latest Ref Range: 12.0 - 16.0 g/dL 8.8 (L)   Hematocrit Latest Ref Range: 37.0 - 47.0 % 26.8 (L)   MCV Latest Ref Range: 81.4 - 97.8 fL 93.1   MCH Latest Ref Range: 27.0 - 33.0 pg 30.6   MCHC Latest Ref Range: 33.6 - 35.0 g/dL 32.8 (L)   RDW Latest Ref Range: 35.9 - 50.0 fL 46.3   Platelet Count Latest Ref Range: 164 - 446 K/uL 320   MPV Latest Ref Range: 9.0 - 12.9 fL 8.3 (L)   Neutrophils-Polys Latest Ref Range: 44.00 - 72.00 % 71.40   Neutrophils (Absolute) Latest Ref Range: 2.00 - 7.15 K/uL 5.81   Lymphocytes Latest Ref Range: 22.00 - 41.00 % 14.00 (L)   Lymphs (Absolute) Latest Ref Range: 1.00 - 4.80 K/uL 1.14   Monocytes Latest Ref Range: 0.00 - 13.40 % 10.40   Monos (Absolute) Latest Ref Range: 0.00 - 0.85 K/uL 0.85   Eosinophils Latest Ref Range: 0.00 - 6.90 % 0.90   Eos (Absolute) Latest Ref Range: 0.00 - 0.51 K/uL 0.07   Basophils Latest Ref Range: 0.00 - 1.80 % 0.50   Baso (Absolute) Latest Ref Range: 0.00 - 0.12 K/uL 0.04   Immature  Granulocytes Latest Ref Range: 0.00 - 0.90 % 2.80 (H)   Immature Granulocytes (abs) Latest Ref Range: 0.00 - 0.11 K/uL 0.23 (H)   Nucleated RBC Latest Units: /100 WBC 0.00   NRBC (Absolute) Latest Units: K/uL 0.00   Sodium Latest Ref Range: 135 - 145 mmol/L 133 (L)   Potassium Latest Ref Range: 3.6 - 5.5 mmol/L 3.3 (L)   Chloride Latest Ref Range: 96 - 112 mmol/L 101   Co2 Latest Ref Range: 20 - 33 mmol/L 24   Anion Gap Latest Ref Range: 0.0 - 11.9  8.0   Glucose Latest Ref Range: 65 - 99 mg/dL 94   Bun Latest Ref Range: 8 - 22 mg/dL 5 (L)   Creatinine Latest Ref Range: 0.50 - 1.40 mg/dL 0.53   GFR If  Latest Ref Range: >60 mL/min/1.73 m 2 >60   GFR If Non  Latest Ref Range: >60 mL/min/1.73 m 2 >60   Calcium Latest Ref Range: 8.5 - 10.5 mg/dL 7.9 (L)   AST(SGOT) Latest Ref Range: 12 - 45 U/L 25   ALT(SGPT) Latest Ref Range: 2 - 50 U/L 34   Alkaline Phosphatase Latest Ref Range: 30 - 99 U/L 133 (H)   Total Bilirubin Latest Ref Range: 0.1 - 1.5 mg/dL 0.6   Albumin Latest Ref Range: 3.2 - 4.9 g/dL 2.7 (L)   Total Protein Latest Ref Range: 6.0 - 8.2 g/dL 4.6 (L)   Globulin Latest Ref Range: 1.9 - 3.5 g/dL 1.9   A-G Ratio Latest Units: g/dL 1.4   Magnesium Latest Ref Range: 1.5 - 2.5 mg/dL 2.1       Imaging:  Per my read, NM SCAN 2/1/19    Impression        Nonvisualization of the gallbladder suggestive of cystic duct obstruction which can be seen in acute cholecystitis         Pathology: 2/4/19    FINAL DIAGNOSIS:    A. Gallbladder:         Acute cholecystitis.      Procedures: 2/4/19    1.  Exploratory laparoscopy.  2.  Conversion to a laparotomy.  3.  Drainage of peritoneal abscess in the right upper quadrant subhepatic   space.  4.  Open cholecystectomy for gangrenous cholecystitis.  5.  Hepatotomy and drainage of a small 3-4 cm hepatic abscess   intraparenchymally near the site of the cystic plate.  6.  We did intraoperative ultrasound survey of the liver, gallbladder, biliary    system and biliary stent.    Assessment:     1. Cholangitis    2. Liver abscess    3.  Biliary obstruction/stricture managed with stent          Medical Decision Making:  Today's Assessment / Status / Plan:     In light of the present findings, the patient is being discharged from my clinic and will follow up with Dr. Moore.  The patient does have a walled metal stent in place to help with her stricture at her previous anastomosis.  My discussions with Dr. Moore are that he will continue to dilated up and hopefully she will not need any more therapy.  Worse case scenarios she might get a permanent metal stent.  We were unable to access the area of the philipp due to the massive inflammatory changes and abscess discovered during her laparotomy.      She agreed and verbalized her agreement and understanding with the current plan. I answered all questions and concerns she has at this time and advised her to call at any time in there interim with questions or concerns in regards to her care.    Thank you for allowing me to participate in her care, I will continue to follow closely.         Please note that this dictation was created using voice recognition software. I have made every reasonable attempt to correct obvious errors, but I expect that there are errors of grammar and possibly content that I did not discover before finalizing the note.     Thank you for this consultation and allowing me to participate in your patient's care. If I can be of further service please contact my office.

## 2019-02-26 NOTE — PATIENT INSTRUCTIONS
The patient will follow up with Dr. Moore at his office for dilation of her stricture and replacement of stent.   Skin normal color for race, warm, dry and intact. No evidence of rash.

## 2019-03-06 LAB
FUNGUS SPEC CULT: NORMAL
SIGNIFICANT IND 70042: NORMAL
SITE SITE: NORMAL
SOURCE SOURCE: NORMAL

## 2019-06-04 ENCOUNTER — APPOINTMENT (OUTPATIENT)
Dept: ADMISSIONS | Facility: MEDICAL CENTER | Age: 74
End: 2019-06-04
Attending: INTERNAL MEDICINE
Payer: MEDICARE

## 2019-06-04 RX ORDER — POLYETHYLENE GLYCOL 3350 17 G/17G
17 POWDER, FOR SOLUTION ORAL PRN
COMMUNITY

## 2019-06-04 NOTE — OR NURSING
"Preadmit appointment: \" Preparing for your Procedure information\" sheet given to patient with verbal and written instructions. Patient instructed to continue prescribed medications through the day before surgery, instructed to take the following medications the day of surgery per anesthesia protocol: Levothyroxine. Pt denies any issue with anesthesia  "

## 2019-06-07 ENCOUNTER — APPOINTMENT (OUTPATIENT)
Dept: RADIOLOGY | Facility: MEDICAL CENTER | Age: 74
End: 2019-06-07
Attending: INTERNAL MEDICINE
Payer: MEDICARE

## 2019-06-07 ENCOUNTER — ANESTHESIA (OUTPATIENT)
Dept: SURGERY | Facility: MEDICAL CENTER | Age: 74
End: 2019-06-07
Payer: MEDICARE

## 2019-06-07 ENCOUNTER — HOSPITAL ENCOUNTER (OUTPATIENT)
Facility: MEDICAL CENTER | Age: 74
End: 2019-06-07
Attending: INTERNAL MEDICINE | Admitting: INTERNAL MEDICINE
Payer: MEDICARE

## 2019-06-07 ENCOUNTER — ANESTHESIA EVENT (OUTPATIENT)
Dept: SURGERY | Facility: MEDICAL CENTER | Age: 74
End: 2019-06-07
Payer: MEDICARE

## 2019-06-07 VITALS
BODY MASS INDEX: 21.37 KG/M2 | RESPIRATION RATE: 16 BRPM | HEIGHT: 63 IN | OXYGEN SATURATION: 100 % | DIASTOLIC BLOOD PRESSURE: 50 MMHG | WEIGHT: 120.59 LBS | TEMPERATURE: 97.9 F | SYSTOLIC BLOOD PRESSURE: 123 MMHG | HEART RATE: 64 BPM

## 2019-06-07 PROBLEM — E87.1 HYPONATREMIA: Status: RESOLVED | Noted: 2019-01-31 | Resolved: 2019-06-07

## 2019-06-07 PROBLEM — K83.09 CHOLANGITIS: Status: RESOLVED | Noted: 2019-01-31 | Resolved: 2019-06-07

## 2019-06-07 PROCEDURE — 500066 HCHG BITE BLOCK, ECT: Performed by: INTERNAL MEDICINE

## 2019-06-07 PROCEDURE — 700101 HCHG RX REV CODE 250: Performed by: ANESTHESIOLOGY

## 2019-06-07 PROCEDURE — 160203 HCHG ENDO MINUTES - 1ST 30 MINS LEVEL 4: Performed by: INTERNAL MEDICINE

## 2019-06-07 PROCEDURE — 160046 HCHG PACU - 1ST 60 MINS PHASE II: Performed by: INTERNAL MEDICINE

## 2019-06-07 PROCEDURE — 110371 HCHG SHELL REV 272: Performed by: INTERNAL MEDICINE

## 2019-06-07 PROCEDURE — C1769 GUIDE WIRE: HCPCS | Performed by: INTERNAL MEDICINE

## 2019-06-07 PROCEDURE — 160009 HCHG ANES TIME/MIN: Performed by: INTERNAL MEDICINE

## 2019-06-07 PROCEDURE — 160025 RECOVERY II MINUTES (STATS): Performed by: INTERNAL MEDICINE

## 2019-06-07 PROCEDURE — 160002 HCHG RECOVERY MINUTES (STAT): Performed by: INTERNAL MEDICINE

## 2019-06-07 PROCEDURE — 160048 HCHG OR STATISTICAL LEVEL 1-5: Performed by: INTERNAL MEDICINE

## 2019-06-07 PROCEDURE — 160035 HCHG PACU - 1ST 60 MINS PHASE I: Performed by: INTERNAL MEDICINE

## 2019-06-07 PROCEDURE — 700111 HCHG RX REV CODE 636 W/ 250 OVERRIDE (IP): Performed by: ANESTHESIOLOGY

## 2019-06-07 PROCEDURE — 700105 HCHG RX REV CODE 258: Performed by: INTERNAL MEDICINE

## 2019-06-07 PROCEDURE — 160036 HCHG PACU - EA ADDL 30 MINS PHASE I: Performed by: INTERNAL MEDICINE

## 2019-06-07 PROCEDURE — 74328 X-RAY BILE DUCT ENDOSCOPY: CPT

## 2019-06-07 RX ORDER — OXYCODONE HCL 5 MG/5 ML
10 SOLUTION, ORAL ORAL
Status: DISCONTINUED | OUTPATIENT
Start: 2019-06-07 | End: 2019-06-07 | Stop reason: HOSPADM

## 2019-06-07 RX ORDER — HYDRALAZINE HYDROCHLORIDE 20 MG/ML
10 INJECTION INTRAMUSCULAR; INTRAVENOUS
Status: DISCONTINUED | OUTPATIENT
Start: 2019-06-07 | End: 2019-06-07 | Stop reason: HOSPADM

## 2019-06-07 RX ORDER — DIPHENHYDRAMINE HYDROCHLORIDE 50 MG/ML
12.5 INJECTION INTRAMUSCULAR; INTRAVENOUS
Status: DISCONTINUED | OUTPATIENT
Start: 2019-06-07 | End: 2019-06-07 | Stop reason: HOSPADM

## 2019-06-07 RX ORDER — MORPHINE SULFATE 10 MG/ML
2 INJECTION, SOLUTION INTRAMUSCULAR; INTRAVENOUS
Status: DISCONTINUED | OUTPATIENT
Start: 2019-06-07 | End: 2019-06-07 | Stop reason: HOSPADM

## 2019-06-07 RX ORDER — MORPHINE SULFATE 10 MG/ML
5 INJECTION, SOLUTION INTRAMUSCULAR; INTRAVENOUS
Status: DISCONTINUED | OUTPATIENT
Start: 2019-06-07 | End: 2019-06-07 | Stop reason: HOSPADM

## 2019-06-07 RX ORDER — SODIUM CHLORIDE, SODIUM LACTATE, POTASSIUM CHLORIDE, CALCIUM CHLORIDE 600; 310; 30; 20 MG/100ML; MG/100ML; MG/100ML; MG/100ML
INJECTION, SOLUTION INTRAVENOUS CONTINUOUS
Status: DISCONTINUED | OUTPATIENT
Start: 2019-06-07 | End: 2019-06-07 | Stop reason: HOSPADM

## 2019-06-07 RX ORDER — MIDAZOLAM HYDROCHLORIDE 1 MG/ML
1 INJECTION INTRAMUSCULAR; INTRAVENOUS
Status: DISCONTINUED | OUTPATIENT
Start: 2019-06-07 | End: 2019-06-07 | Stop reason: HOSPADM

## 2019-06-07 RX ORDER — HALOPERIDOL 5 MG/ML
1 INJECTION INTRAMUSCULAR
Status: DISCONTINUED | OUTPATIENT
Start: 2019-06-07 | End: 2019-06-07 | Stop reason: HOSPADM

## 2019-06-07 RX ORDER — METOPROLOL TARTRATE 1 MG/ML
1 INJECTION, SOLUTION INTRAVENOUS
Status: DISCONTINUED | OUTPATIENT
Start: 2019-06-07 | End: 2019-06-07 | Stop reason: HOSPADM

## 2019-06-07 RX ORDER — MEPERIDINE HYDROCHLORIDE 25 MG/ML
12.5 INJECTION INTRAMUSCULAR; INTRAVENOUS; SUBCUTANEOUS
Status: DISCONTINUED | OUTPATIENT
Start: 2019-06-07 | End: 2019-06-07 | Stop reason: HOSPADM

## 2019-06-07 RX ORDER — LIDOCAINE HYDROCHLORIDE 10 MG/ML
INJECTION, SOLUTION INFILTRATION; PERINEURAL
Status: DISCONTINUED
Start: 2019-06-07 | End: 2019-06-07 | Stop reason: HOSPADM

## 2019-06-07 RX ADMIN — PROPOFOL 20 MG: 10 INJECTION, EMULSION INTRAVENOUS at 09:01

## 2019-06-07 RX ADMIN — PROPOFOL 20 MG: 10 INJECTION, EMULSION INTRAVENOUS at 09:17

## 2019-06-07 RX ADMIN — PROPOFOL 20 MG: 10 INJECTION, EMULSION INTRAVENOUS at 09:00

## 2019-06-07 RX ADMIN — PROPOFOL 20 MG: 10 INJECTION, EMULSION INTRAVENOUS at 09:08

## 2019-06-07 RX ADMIN — FENTANYL CITRATE 100 MCG: 50 INJECTION, SOLUTION INTRAMUSCULAR; INTRAVENOUS at 08:59

## 2019-06-07 RX ADMIN — PROPOFOL 20 MG: 10 INJECTION, EMULSION INTRAVENOUS at 08:59

## 2019-06-07 RX ADMIN — PROPOFOL 20 MG: 10 INJECTION, EMULSION INTRAVENOUS at 09:14

## 2019-06-07 RX ADMIN — PROPOFOL 20 MG: 10 INJECTION, EMULSION INTRAVENOUS at 09:12

## 2019-06-07 RX ADMIN — MIDAZOLAM HYDROCHLORIDE 2 MG: 1 INJECTION, SOLUTION INTRAMUSCULAR; INTRAVENOUS at 08:49

## 2019-06-07 RX ADMIN — SODIUM CHLORIDE, POTASSIUM CHLORIDE, SODIUM LACTATE AND CALCIUM CHLORIDE: 600; 310; 30; 20 INJECTION, SOLUTION INTRAVENOUS at 08:57

## 2019-06-07 RX ADMIN — SODIUM CHLORIDE, POTASSIUM CHLORIDE, SODIUM LACTATE AND CALCIUM CHLORIDE: 600; 310; 30; 20 INJECTION, SOLUTION INTRAVENOUS at 08:52

## 2019-06-07 RX ADMIN — SODIUM CHLORIDE, POTASSIUM CHLORIDE, SODIUM LACTATE AND CALCIUM CHLORIDE: 600; 310; 30; 20 INJECTION, SOLUTION INTRAVENOUS at 08:22

## 2019-06-07 RX ADMIN — LIDOCAINE HYDROCHLORIDE 100 MG: 20 INJECTION, SOLUTION INFILTRATION; PERINEURAL at 08:59

## 2019-06-07 ASSESSMENT — PAIN SCALES - GENERAL: PAIN_LEVEL: 0

## 2019-06-07 NOTE — OR NURSING
0953 REPORT RECEIVED FROM JONATHAN MCNEILL TO ASSUME CARE OF THIS PT. PT DENIES PAIN, NAUSEA. VSS 1009 REPORT GIVEN TO JONATHAN MCNEILL TO RESUME CARE OF THIS PT. VSS.

## 2019-06-07 NOTE — ANESTHESIA TIME REPORT
Anesthesia Start and Stop Event Times     Date Time Event    6/7/2019 0857 Anesthesia Start     0928 Anesthesia Stop        Responsible Staff  06/07/19    Name Role Begin End    Oliverio Mcgee M.D. Anesth 0857 0928        Preop Diagnosis (Free Text):  Pre-op Diagnosis     COMMON HEPATIC DUCT STRICTURE FROM PRIOR SURGICAL T TUBE        Preop Diagnosis (Codes):  Diagnosis Information     Diagnosis Code(s): Bile duct stricture [K83.1]        Post op Diagnosis  Common bile duct stricture      Premium Reason  Non-Premium    Comments:

## 2019-06-07 NOTE — OR NURSING
0926To PACU from [facility] via anne. Pt requires head tilt chin lift at this time. Pt does not rouse to verbal or painful stimuli. VSS.   0935 Pt rouses to verbal stimuli at this time. VSS Pt denies pain and nausea .   0998 Report to CHARITO Ny   1009 Received report from CHARITO Ny. Pts VSS.   1015 Pt meets criteria for stage two VSS

## 2019-06-07 NOTE — ANESTHESIA POSTPROCEDURE EVALUATION
Patient: Clementine Hopper    Procedure Summary     Date:  06/07/19 Room / Location:   ENDOSCOPIC ULTRASOUND ROOM / SURGERY [facility]    Anesthesia Start:  0857 Anesthesia Stop:  0928    Procedure:  ERCP, DIAGNOSTIC- WITH SNARE METALLIC BILIARY STENT REMOVAL AND BALLOON COMMON BILE DUCT STONES EXTRACTION Diagnosis:       Bile duct stricture      (COMMON HEPATIC DUCT STRICTURE)    Surgeon:  Haroon Moore M.D. Responsible Provider:  Oliverio Mcgee M.D.    Anesthesia Type:  general, MAC ASA Status:  2          Final Anesthesia Type: general, MAC  Last vitals  BP   Blood Pressure : 146/71    Temp   36.7 °C (98.1 °F)    Pulse   Heart Rate (Monitored): (!) 55   Resp   16    SpO2   98 %      Anesthesia Post Evaluation    Patient location during evaluation: PACU  Patient participation: complete - patient participated  Level of consciousness: lethargic  Pain score: 0    Airway patency: patent  Anesthetic complications: no  Cardiovascular status: hemodynamically stable  Respiratory status: acceptable  Hydration status: euvolemic    PONV: none           Nurse Pain Score: 0 (NPRS)

## 2019-06-07 NOTE — PROGRESS NOTES
Patient seen and examined before proceeding with ERCP.  Risks, benefits, and alternatives were discussed with patient and daughter. Consenting persons were given an opportunity to ask questions anddiscuss other options. Risks including but not limited to cholangitis, hepatic abscess, pancreatitis, contrast reaction, radiation exposure,stent migration and/or occlusion, perforation, infection, bleeding, missed lesion(s), cardiac and/or pulmonary event, aspiration, hypoxia, stroke, possible need for surgery and/or interventional radiology, hospitalizationpossibly prolonged, discomfort, unsuccessful and/or incomplete procedure, indefinite diagnosis, ineffective therapy and/or persistent symptoms,possible need for repeat procedures and/or additional testings, damage toadjacent organs and/or vascular structures, medication reaction,disability, death, and other adverse events possibly life-threatening.  Discussion was undertaken with Layman's terms. Consenting persons stated understanding and acceptance of these risks, and wished to proceed.Informed consent was given in clear state of mind.

## 2019-06-07 NOTE — PROCEDURES
Pre-procedure Diagnoses:   Biliary stricture [K83.1]   Foreign body in digestive tract, sequela [T18.9XXS]   Biliary tract imaging abnormality [R93.2]     Post-procedure Diagnoses:   Choledocholithiasis [K80.50]   Hx of cholecystectomy [Z90.49]     Procedures:   ENDOSCOPIC RETROGRADE CHOLANGIOPANCREATOGRAPHY (ERCP) WITH REMOVAL OF FOREIGN BODY/STENT FROM BILIARY DUCT [38153 (CPT®)]   ENDOSCOPIC RETROGRADE CHOLANGIOPANCREATOGRAPHY (ERCP) WITH BALLOON REMOVAL OF STONES FROM BILIARY DUCTS [82013 (CPT®)]   CHOLANGIOGRAM OR X-RAY OF BILE DUCT VIA ENDOSCOPY [92224 (CPT®)]     Endoscopist: Haroon Moore MD, [facility], [facility]    Monitored anesthesia care: Dr. Oliverio Mcgee    Consent: Patient seen and examined before proceeding with ERCP. Risks, benefits, and alternatives were discussed with patient and daughter. Consenting persons were given an opportunity to ask questions anddiscuss other options. Risks including but not limited to cholangitis, hepatic abscess, pancreatitis, contrast reaction, radiation exposure,stent migration and/or occlusion, perforation, infection, bleeding, missed lesion(s), cardiac and/or pulmonary event, aspiration, hypoxia, stroke, possible need for surgery and/or interventional radiology, hospitalizationpossibly prolonged, discomfort, unsuccessful and/or incomplete procedure, indefinite diagnosis, ineffective therapy and/or persistent symptoms,possible need for repeat procedures and/or additional testings, damage toadjacent organs and/or vascular structures, medication reaction,disability, death, and other adverse events possibly life-threatening. Discussion was undertaken with Layman's terms. Consenting persons stated understanding and acceptance of these risks, and wished to proceed.Informed consent was given in clear state of mind.    Endoscopic procedures in detail: ERCP scope was inserted from mouth to 2nd portion of the duodenum. Prior metallic biliary stent was grasped with snare, scope and  "intact stent were removed as one. Biliary duct was selectively cannulated on the first attempt with a balloon and guidewire, successful free cannulation was achieved. Cholangiogram was injected and completed. Prior biliary sphincterotomy was widely patent.  Balloon was inflated and used to drag the biliary duct with removal ofsmall choledocholithiais or sludge.  The C-arm was moved and rotated on rainbow axis to optimize imaging of intrahepatic biliarysystems in different angles to avoid missing lesions/strictures with ductal overlap and/or image angulation. The ERCP scope was removed from duodenum to also image the common bile duct \"behind\" the ERCP scope. Of note, no radiologist was present to help obtain nor read ERCP images. I was the sole physician who obtained and performed interpretation of staticand dynamic ERCP fluoroscopic x-ray images, no over-read was requested from the radiologist nor was it necessary. There was suction of insufflated air and stomach fluid contents upon removal    Procedure times:  - In-room 08:57  - Start 09:09  - Completed 09:19  - Out of room per nursing records    Endoscopic Retrograde CholangioPancreatography Findings:  - Ampulla of Vater:  located in 2nd portion of duodenum adjacent to theright of a small periampullary diverticulum. Prior biliary sphincterotomy widely patent.  - Cholangiogram: common hepatic duct stricture resolved with prior stenting. No evidence of other intrahepatic strictures.  - Pancreatogram: intentionally not injected nor cannulated.  - Therapy: prior metallic biliary stent removed intact, balloon choledocholithiasis extraction clearance complete.    Impression:  1. Common hepatic duct stricture resolved with prior stenting, suspected benign from prior bile duct surgery in distant past  2. Choledocholithiasis biliary sludge balloon extracted  3. Patient prior biliary sphincterotomy  4. Juxta-ampullary duodenal diverticulum    Recommendations:   1.  Routine " post-endoscopy anesthesia recovery care.  Discharge patient when she is awake, alert and comfortable, when recovery criteria are met.  Aspiration and fall precautions x 24 hours.   2.  Repeat LFTs in 8 to 12 weeks.  3.  Follow-up with primary care and repeat annual LFTs  4.  I spoke to patient, daughter and recovery nurse about impression, diagnosis and recommendations.

## 2019-06-07 NOTE — OR NURSING
1027: Patient to stage II from PACU.   Patient assisted with changing by CNA and is now sitting in recliner chair.  No reported nausea, reports no pain 0/10.  1042:  Discharge instructions and education provided to patient and daughter.  Discharge medications discussed, patient has no questions about discharge or discharge medication at this time.  Patient states she is ready to go home several times.  IV removed, tip intact.

## 2019-06-07 NOTE — DISCHARGE INSTRUCTIONS
GASTROSCOPY   1. Don't eat or drink anything for about an hour after the test. You can then resume your regular diet.   2. Don't drive or drink alcohol for 24 hours. The medication you received will make you too drowsy.  3. Don't take any coffee, tea, or aspirin products until after you see your doctor. These can harm the lining of your stomach.  4. If you begin to vomit bloody material, or develop black or bloody stools, call your doctor as soon as possible.   5. If you have any neck, chest, abdominal pain or temp over 100 degrees call your doctor.   See your doctor on: [facility] WILL CALL FOR REPEAT LIVER FUNCTION TEST IN 8 TO 12 WEEKS  FOLLOW-UP WITH PRIMARY CARE AND REPEAT ANNUAL LIVER FUNCTION TEST.  6. Additional instructions: NO ALCOHOL OR SLEEPING PILLS TODAY.   7. Prescriptions: NONE    DR. SIMON  [phone]     Discharge time: ***    You should call 911 if you develop problems with breathing or chest pain.    Nurse's Signature: ___________________________________    I acknowledge receipt and understanding of these Home Care instructions.

## 2019-06-07 NOTE — ANESTHESIA PREPROCEDURE EVALUATION
Relevant Problems   (+) Anemia   (+) Dyslipidemia   (+) Hypertension   (+) Hyponatremia   (+) Hypothyroid       Physical Exam    Airway   Mallampati: II  TM distance: >3 FB  Neck ROM: full       Cardiovascular - normal exam  Rhythm: regular  Rate: normal  (-) murmur     Dental - normal exam         Pulmonary - normal exam  Breath sounds clear to auscultation     Abdominal    Neurological - normal exam                 Anesthesia Plan    ASA 2       Plan - general and MAC       Airway plan will be natural airway        Induction: intravenous    Postoperative Plan: Postoperative administration of opioids is intended.    Pertinent diagnostic labs and testing reviewed    Informed Consent:    Anesthetic plan and risks discussed with patient.    Use of blood products discussed with: patient whom consented to blood products.

## 2019-06-07 NOTE — ANESTHESIA QCDR
2019 [facility] Clinical Data Registry (for Quality Improvement)     Postoperative nausea/vomiting risk protocol (Adult = 18 yrs and Pediatric 3-17 yrs)- (430 and 463)  General inhalation anesthetic (NOT TIVA) with PONV risk factors: No  Provision of anti-emetic therapy with at least 2 different classes of agents: N/A  Patient DID NOT receive anti-emetic therapy and reason is documented in Medical Record: N/A    Multimodal Pain Management- (AQI59)  Patient undergoing Elective Surgery (i.e. Outpatient, or ASC, or Prescheduled Surgery prior to Hospital Admission): No  Use of Multimodal Pain Management, two or more drugs and/or interventions, NOT including systemic opioids: N/A  Exception: Documented allergy to multiple classes of analgesics: N/A    PACU assessment of acute postoperative pain prior to Anesthesia Care End- Applies to Patients Age = 18- (ABG7)  Initial PACU pain score is which of the following: < 7/10  Patient unable to report pain score: N/A    Post-anesthetic transfer of care checklist/protocol to PACU/ICU- (426 and 427)  Upon conclusion of case, patient transferred to which of the following locations: PACU/Non-ICU  Use of transfer checklist/protocol: Yes  Exclusion: Service Performed in Patient Hospital Room (and thus did not require transfer): N/A    PACU Reintubation- (AQI31)  General anesthesia requiring endotracheal intubation (ETT) along with subsequent extubation in OR or PACU: No  Required reintubation in the PACU: N/A  Extubation was a planned trial documented in the medical record prior to removal of the original airway device: N/A    Unplanned admission to ICU related to anesthesia service up through end of PACU care- (MD51)  Unplanned admission to ICU (not initially anticipated at anesthesia start time): No

## 2020-09-01 ENCOUNTER — APPOINTMENT (RX ONLY)
Dept: URBAN - NONMETROPOLITAN AREA CLINIC 1 | Facility: CLINIC | Age: 75
Setting detail: DERMATOLOGY
End: 2020-09-01

## 2020-09-01 VITALS — TEMPERATURE: 97.5 F

## 2020-09-01 DIAGNOSIS — L21.8 OTHER SEBORRHEIC DERMATITIS: ICD-10-CM

## 2020-09-01 DIAGNOSIS — L82.0 INFLAMED SEBORRHEIC KERATOSIS: ICD-10-CM

## 2020-09-01 PROCEDURE — 99214 OFFICE O/P EST MOD 30 MIN: CPT | Mod: 25

## 2020-09-01 PROCEDURE — 17110 DESTRUCTION B9 LES UP TO 14: CPT

## 2020-09-01 PROCEDURE — ? COUNSELING

## 2020-09-01 PROCEDURE — ? LIQUID NITROGEN

## 2020-09-01 PROCEDURE — ? PRESCRIPTION

## 2020-09-01 RX ORDER — KETOCONAZOLE 20 MG/ML
SHAMPOO, SUSPENSION TOPICAL QD
Qty: 1 | Refills: 3 | Status: ERX | COMMUNITY
Start: 2020-09-01

## 2020-09-01 RX ADMIN — KETOCONAZOLE 1: 20 SHAMPOO, SUSPENSION TOPICAL at 00:00

## 2020-09-01 ASSESSMENT — LOCATION ZONE DERM
LOCATION ZONE: TRUNK
LOCATION ZONE: SCALP
LOCATION ZONE: LEG

## 2020-09-01 ASSESSMENT — LOCATION DETAILED DESCRIPTION DERM
LOCATION DETAILED: LEFT RIB CAGE
LOCATION DETAILED: RIGHT SUPERIOR OCCIPITAL SCALP
LOCATION DETAILED: RIGHT ANTERIOR DISTAL THIGH

## 2020-09-01 ASSESSMENT — LOCATION SIMPLE DESCRIPTION DERM
LOCATION SIMPLE: RIGHT THIGH
LOCATION SIMPLE: RIGHT OCCIPITAL SCALP
LOCATION SIMPLE: ABDOMEN

## 2020-09-01 NOTE — PROCEDURE: LIQUID NITROGEN
Detail Level: Simple
Medical Necessity Clause: This procedure was medically necessary because the lesions that were treated were:
Consent: The patient's consent was obtained including but not limited to risks of crusting, scabbing, blistering, scarring, darker or lighter pigmentary change, recurrence, incomplete removal and infection.
Include Z78.9 (Other Specified Conditions Influencing Health Status) As An Associated Diagnosis?: No
Render Post-Care Instructions In Note?: yes
Post-Care Instructions: I reviewed with the patient in detail post-care instructions. Patient is to wear sunprotection, and avoid picking at any of the treated lesions. Pt may apply Vaseline to crusted or scabbing areas.
Number Of Freeze-Thaw Cycles: 2 freeze-thaw cycles
Medical Necessity Information: It is in your best interest to select a reason for this procedure from the list below. All of these items fulfill various CMS LCD requirements except the new and changing color options.

## 2020-10-01 NOTE — DISCHARGE INSTRUCTIONS
ENDOSCOPY HOME CARE INSTRUCTIONS    GASTROSCOPY OR ERCP  1. Don't eat or drink anything for about an hour after the test. You can then resume your regular diet.  2. Don't drive or drink alcohol for 24 hours. The medication you received will make you too drowsy.  3. Don't take any coffee, tea, or aspirin products until after you see your doctor. These can harm the lining of your stomach.  4. If you begin to vomit bloody material, or develop black or bloody stools, call your doctor as soon as possible.  5. If you have any neck, chest, abdominal pain or temp of 100 degrees, call your doctor.  6. See your doctor [facility] office will call for follow-up repeat ERCP.  7. Additional instructions: ***  8. Prescriptions: Augmentin 875/125mg PO q12 hours x 10 doses for infection prophylaxis.            Dr. Moore [phone]        You should call 911 if you develop problems with breathing or chest pain.  If any questions arise, call your doctor. If your doctor is not available, please feel free to call [phone]. You can also call the HEALTH HOTLINE open 24 hours/day, 7 days/week and speak to a nurse at [phone], or toll free [phone].    Depression / Suicide Risk    As you are discharged from this [facility] facility, it is important to learn how to keep safe from harming yourself.    Recognize the warning signs:  · Abrupt changes in personality, positive or negative- including increase in energy   · Giving away possessions  · Change in eating patterns- significant weight changes-  positive or negative  · Change in sleeping patterns- unable to sleep or sleeping all the time   · Unwillingness or inability to communicate  · Depression  · Unusual sadness, discouragement and loneliness  · Talk of wanting to die  · Neglect of personal appearance   · Rebelliousness- reckless behavior  · Withdrawal from people/activities they love  · Confusion- inability to concentrate     If you or a loved one observes  any of these behaviors or has concerns about self-harm, here's what you can do:  · Talk about it- your feelings and reasons for harming yourself  · Remove any means that you might use to hurt yourself (examples: pills, rope, extension cords, firearm)  · Get professional help from the community (Mental Health, Substance Abuse, psychological counseling)  · Do not be alone:Call your Safe Contact- someone whom you trust who will be there for you.  · Call your local CRISIS HOTLINE 347-1519 or 315-059-2493  · Call your local Children's Mobile Crisis Response Team [place] (201) 514-0300 or www.[employer]ibook  · Call the toll free National Suicide Prevention Hotlines   · National Suicide Prevention Lifeline 028-408-KYTD (9561)  · National Hope Line Network 800-SUICIDE (827-1830)    I acknowledge receipt and understanding of these Home Care Instructions.   PROVIDER:[TOKEN:[[id number]],FOLLOWUP:[1-3 days]] FREE:[LAST:[Alicia],FIRST:[Jose Guadalupe],PHONE:[phone]],FAX:[phone]],ADDRESS:[[address] [place] [address]],FOLLOWUP:[1-3 days]]

## 2022-12-02 NOTE — PROCEDURES
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    Anesthesiologist: Dr. Zelda Griffin    Premedications: zosyn 3.375 grams IV.    Consent: Risks, benefits, and alternatives were discussed with patient and daughter. Consenting persons were given an opportunity to ask questions and discuss other options. Risks including but not limited to cholangitis, cholecystitis, pancreatitis, contrast reaction, radiation exposure, stent migration and/or occlusion, perforation, infection, bleeding, missed lesion(s), cardiac and/or pulmonary event, aspiration, hypoxia, stroke, possible need for surgery and/or interventional radiology, hospitalization possibly prolonged, discomfort, unsuccessful and/or incomplete procedure, indefinite diagnosis, ineffective therapy and/or persistent symptoms, possible need for repeat procedures and/or additional testings, damage to adjacent organs and/or vascular structures, medication reaction, disability, death, and other adverse events possibly life-threatening. Discussion was undertaken with Layman's terms. Consenting persons stated understanding and acceptance of these risks,  "and wished to proceed. Informed consent was given in clear state of mind.    Endoscopic procedures in detail: ERCP scope was inserted from mouth to 2nd portion of the duodenum.   Biliary duct was selectively cannulated on the first attempt with a balloon and guidewire, successful free cannulation was achieved.  Cholangiogram was injected and completed.  Prior biliary sphincterotomy was widely patent.  Balloon was inflated and used to drag the biliary duct with removal of small choledocholithiais or sludge.  Guidewire was threaded pass the common hepatic duct stricture and then balloon was exchanged for Spyglass cholangioscope.  Cholangioscopy was performed from within the biliary stricture to the distal bile duct, targeted biopsies were obtained from the stricture under cholangioscopy.  Then separate standard biopsy forceps were used to biopsy the biliary stricture after removal of cholangioscope.  Guidewire was threaded proximal to the biliary stricture and cholangiogram was performed to confirm anatomy and wire position.  A fully covered 10mm x 8cm biliary metallic stent was placed under fluroscopic and endoscopic guidance.  Post-stent deployment position was ideal with waist in the middle of the stent and stent protruding out the biliary os with a few cages into the duodenum.  Injected contrast drained well.  The C-arm was moved and rotated on rainbow axis to optimize imaging of intrahepatic biliary systems in different angles to avoid missing lesions/strictures with ductal overlap and/or image angulation. The ERCP scope was removed from duodenum to also image the common bile duct \"behind\" the ERCP scope.  Of note, no radiologist was present to help obtain nor read ERCP images.  I was the sole physician who obtained and performed interpretation of static and dynamic ERCP fluoroscopic x-ray images, no over-read was requested from the radiologist nor was it necessary.  There was suction of insufflated air and " stomach fluid contents upon removal.    Procedure times:  - In-room 08:12  - Start 08:24  - Completed 08:53  - Out of room per nursing records    Endoscopic Retrograde CholangioPancreatography Findings:  - Ampulla of Vater: located in 2nd portion of duodenum adjacent to the right of a small periampullary diverticulum.  Prior biliary sphincterotomy widely patent.  - Cholangiogram: common hepatic duct shelf-like short but tight stricture with upstream ductal dilation to greater than 1cm just upstream from level of cystic duct takeoff.  No evidence of other intrahepatic strictures.  - Pancreatogram: intentionally not injected nor cannulated.  - Therapy: balloon choledocholithiasis extraction, common hepatic duct stricture biopsies, fully covered metallic biliary stent placement.  - Cholangioscopy: benign-appearing smooth shelf-like short common hepatic duct stricture with minimal neovascularization.    Impression:  1. Common hepatic duct stricture, suspected benign but biopsies results are pending  2. Patient prior biliary sphincterotomy  3. Fully-covered metallic biliary stent placement  4. Juxta-ampullary duodenal diverticulum    Recommendations:   1.  Routine post-endoscopy anesthesia recovery care.  Discharge patient when she is awake, alert and comfortable, when recovery criteria are met.  Aspiration and fall precautions x 24 hours.   2.  Augmentin 875/125mg PO q12 hours x 10 doses for infection prophylaxis  3.  Recall ERCP by Dr. Brandon in 6 to 8 weeks for stent removal.  4.  I spoke to patient, daughter and recovery nurse about impression, diagnosis and recommendations.            Aklief counseling:  Patient advised to apply a pea-sized amount only at bedtime and wait 30 minutes after washing their face before applying.  If too drying, patient may add a non-comedogenic moisturizer.  The most commonly reported side effects including irritation, redness, scaling, dryness, stinging, burning, itching, and increased risk of sunburn.  The patient verbalized understanding of the proper use and possible adverse effects of retinoids.  All of the patient's questions and concerns were addressed.

## 2024-11-14 NOTE — PROCEDURE: COUNSELING
Outpatient Medications Marked as Taking for the 11/12/21 encounter (Refill) with Trev Chang MD   Medication Sig Dispense Refill   • sulfaSALAzine (AZULFIDINE) 500 MG tablet Take 2 tablets by mouth 2 times daily. 360 tablet 1        Ok to leave detailed Message: Yes  Informed caller of refill policy- 24-48 hours: Yes  No call back needed unless nurse has questions.     Pharmacy:   [id number] [place] [id number] [facility]  [phone]  
Received refill request from patient for sulfasalazine   Last office appointment on 9/13/2021    Refill pended to pharmacy.   
Detail Level: Simple
(4) no impairment
Detail Level: Generalized

## (undated) DEVICE — PAD LAP STERILE 18 X 18 - (5/PK 40PK/CA)

## (undated) DEVICE — NEPTUNE 4 PORT MANIFOLD - (20/PK)

## (undated) DEVICE — DRAPE IOBAN II INCISE 23X17 - (10EA/BX 4BX/CA)

## (undated) DEVICE — SYRINGE SAFETY 10 ML 18 GA X 1 1/2 BLUNT LL (100/BX 4BX/CA)

## (undated) DEVICE — CLIP MED LG INTNL HRZN TI ESCP - (20/BX)

## (undated) DEVICE — CANISTER SUCTION 3000ML MECHANICAL FILTER AUTO SHUTOFF MEDI-VAC NONSTERILE LF DISP  (40EA/CA)

## (undated) DEVICE — BLADE SURGICAL #10 - (50/BX)

## (undated) DEVICE — SET EXTENSION WITH 2 PORTS (48EA/CA) ***PART #2C8610 IS A SUBSTITUTE*****

## (undated) DEVICE — CATHETER IV SAFETY 20 GA X 1-1/4 (50/BX)

## (undated) DEVICE — BASIN EMESIS DISP. - (250/CA)

## (undated) DEVICE — MASK ANESTHESIA ADULT  - (100/CA)

## (undated) DEVICE — TUBE SUCTION YANKAUER  1/4 X 6FT (20EA/CA)"

## (undated) DEVICE — TROCAR 5X100 NON BLADED Z-TH - READ KII (6/BX)

## (undated) DEVICE — RESERVOIR SUCTION 100 CC - SILICONE (20EA/CA)

## (undated) DEVICE — SPONGE GAUZE NON-STERILE 4X4 - (2000/CA 10PK/CA)

## (undated) DEVICE — ELECTRODE 850 FOAM ADHESIVE - HYDROGEL RADIOTRNSPRNT (50/PK)

## (undated) DEVICE — TRUETOME JAG 44 PRELOADED SPHINTERTOME

## (undated) DEVICE — EXTRACTOR PRO XL 9-12 MM ABOVE

## (undated) DEVICE — SYRINGE SAFETY 5 ML 18 GA X 1-1/2 BLUNT LL (100/BX 4BX/CA)

## (undated) DEVICE — CONTAINER, SPECIMEN, STERILE

## (undated) DEVICE — PACK LAP CHOLE OR - (2EA/CA)

## (undated) DEVICE — SYRINGE SAFETY 3 ML 18 GA X 1 1/2 BLUNT LL (100/BX 8BX/CA)

## (undated) DEVICE — KIT ANESTHESIA W/CIRCUIT & 3/LT BAG W/FILTER (20EA/CA)

## (undated) DEVICE — BALLOON RETRIEVAL EXTRACTOR PRO RX   9-12MM

## (undated) DEVICE — BITE BLOCK ADULT 60FR (100EA/CA)

## (undated) DEVICE — SPONGE LAP XR ST 36X36 LF - (1/PK40PK/CA)

## (undated) DEVICE — SUCTION INSTRUMENT YANKAUER BULBOUS TIP W/O VENT (50EA/CA)

## (undated) DEVICE — CANNULA W/ SUPPLY TUBING O2 - (50/CA)

## (undated) DEVICE — SUTURE 4-0 PDS II RB-1 (36EA/BX)

## (undated) DEVICE — SYRINGE DISP. 50CC LS - (40/BX)

## (undated) DEVICE — CANNULA W/SEAL 5X100 Z-THRE - ADED KII (12/BX)

## (undated) DEVICE — TUBE CONNECTING SUCTION - CLEAR PLASTIC STERILE 72 IN (50EA/CA)

## (undated) DEVICE — FORCEPS BIOPSY SPYBITE

## (undated) DEVICE — GLOVE BIOGEL SZ 8 SURGICAL PF LTX - (50PR/BX 4BX/CA)

## (undated) DEVICE — SENSOR SPO2 ADULT LNCS ADTX (20/BX) ORDER ITEM #19593

## (undated) DEVICE — KIT CUSTOM PROCEDURE SINGLE FOR ENDO  (15/CA)

## (undated) DEVICE — GLOVE BIOGEL PI ORTHO SZ 7.5 PF LF (40PR/BX)

## (undated) DEVICE — SET LEADWIRE 5 LEAD BEDSIDE DISPOSABLE ECG (1SET OF 5/EA)

## (undated) DEVICE — TUBING CLEARLINK DUO-VENT - C-FLO (48EA/CA)

## (undated) DEVICE — GOWN SURGICAL X-LARGE ULTRA - FILM-REINFORCED (20/CA)

## (undated) DEVICE — CATHETER CHOLANGIOSCOPE DS ACCESS & DELIVERY SPYSCOPE FOR SPYGLASS

## (undated) DEVICE — SLEEVE, VASO, THIGH, MED

## (undated) DEVICE — GLOVE, LITE (PAIR)

## (undated) DEVICE — Device

## (undated) DEVICE — SPHINCTERTOME TRUETOME 44 20MM PRELOAD JAG 035IN

## (undated) DEVICE — SUTURE 2-0 SILK SH 30 IN C/R (12PK/BX)

## (undated) DEVICE — KIT  I.V. START (100EA/CA)

## (undated) DEVICE — SUTURE 3-0 SILK SH (12PK/BX)

## (undated) DEVICE — DRAPE LARGE 3 QUARTER - (20/CA)

## (undated) DEVICE — GOWN SURGEONS LARGE - (32/CA)

## (undated) DEVICE — DETERGENT RENUZYME PLUS 10 OZ PACKET (50/BX)

## (undated) DEVICE — CANISTER SUCTION RIGID RED 1500CC (40EA/CA)

## (undated) DEVICE — GUIDE JAGWIRE 035 STRAIGHT (2EA/BX)

## (undated) DEVICE — SCISSORS 5MM CVD (6EA/BX)

## (undated) DEVICE — STAPLE 45MM VASCULAR WHITE 2.5MM (12EA/BX)

## (undated) DEVICE — TROCAR Z THREAD12MM OPTICAL - NON BLADED (6/BX)

## (undated) DEVICE — CLIP LG INTNL HRZN TI ESCP LGT - (20/BX)

## (undated) DEVICE — TUBE E-T HI-LO CUFF 7.0MM (10EA/PK)

## (undated) DEVICE — BALLOON  DILATATION  4-4 5.8 180

## (undated) DEVICE — SUTURE 0 VICRYL PLUS UR-6 - 27 INCH (36/BX)

## (undated) DEVICE — PROTECTOR ULNA NERVE - (36PR/CA)

## (undated) DEVICE — CLIP MED INTNL HRZN TI ESCP - (25/BX)

## (undated) DEVICE — STAPLER SKIN DISP - (6/BX 10BX/CA) VISISTAT

## (undated) DEVICE — TUBING INSUFFLATION - (10/BX)

## (undated) DEVICE — SURGIFOAM (SIZE 100) - (6EA/CA)

## (undated) DEVICE — LACTATED RINGERS INJ 1000 ML - (14EA/CA 60CA/PF)

## (undated) DEVICE — SEALERTISSUE LINK/OPEN PROCED

## (undated) DEVICE — TUBE, CULTURE AEROBIC

## (undated) DEVICE — SUTURE 1 PDS II PLUS TP-1 - (12PK/BX)

## (undated) DEVICE — KIT ROOM DECONTAMINATION

## (undated) DEVICE — ELECTRODE DUAL RETURN W/ CORD - (50/PK)

## (undated) DEVICE — HEAD HOLDER JUNIOR/ADULT

## (undated) DEVICE — SUTURE GENERAL

## (undated) DEVICE — BAG RETRIEVAL 10ML (10EA/BX)

## (undated) DEVICE — BOVIE  BLADE 6 EXTENDED - (50/PK)

## (undated) DEVICE — FILM CASSETTE ENDO

## (undated) DEVICE — CAPTIVATOR II-15MM ROUND STIFF  (40/BX)

## (undated) DEVICE — SODIUM CHL IRRIGATION 0.9% 1000ML (12EA/CA)

## (undated) DEVICE — BLOCK BITE ENDOSCOPIC 2809 - (100/BX) INTERMEDIATE

## (undated) DEVICE — DRAPESURG STERI-DRAPE LONG - (10/BX 4BX/CA)

## (undated) DEVICE — SWAB ANAEROBIC SPEC.COLLECTOR - (25/PK 4PK/CA 100EA/CA)

## (undated) DEVICE — SUTURE 2-0 ETHILON FS - (36/BX) 18 INCH

## (undated) DEVICE — APPLIER ENDO MEDIUM CLIP (3EA/BX)

## (undated) DEVICE — CHLORAPREP 26 ML APPLICATOR - ORANGE TINT(25/CA)

## (undated) DEVICE — GOWN SURGEONS X-LARGE - DISP. (30/CA)

## (undated) DEVICE — SHEARS ELECTROSURGICAL W/ HANDCONTROL BUTTON STERILE CURVE L23 CM OD5 MM 3 (6EA/BX)

## (undated) DEVICE — SET SUCTION/IRRIGATION WITH DISPOSABLE TIP (6/CA )PART #0250-070-520 IS A SUB